# Patient Record
Sex: FEMALE | Race: WHITE | NOT HISPANIC OR LATINO | Employment: PART TIME | URBAN - METROPOLITAN AREA
[De-identification: names, ages, dates, MRNs, and addresses within clinical notes are randomized per-mention and may not be internally consistent; named-entity substitution may affect disease eponyms.]

---

## 2017-01-17 ENCOUNTER — TELEPHONE (OUTPATIENT)
Dept: OBGYN | Facility: CLINIC | Age: 37
End: 2017-01-17

## 2017-01-17 NOTE — TELEPHONE ENCOUNTER
Called and left message at home # via  services RE: todays appointment. Pt is not 6 weeks post partum (delivered 12/22/2016) unless she is having any problems or issues she should reschedule this appt. (per Dr Castrejon).  Christina Preciado MA

## 2017-01-23 ENCOUNTER — APPOINTMENT (OUTPATIENT)
Dept: GENERAL RADIOLOGY | Facility: CLINIC | Age: 37
End: 2017-01-23
Attending: EMERGENCY MEDICINE
Payer: COMMERCIAL

## 2017-01-23 ENCOUNTER — HOSPITAL ENCOUNTER (EMERGENCY)
Facility: CLINIC | Age: 37
Discharge: HOME | End: 2017-01-23
Attending: EMERGENCY MEDICINE | Admitting: EMERGENCY MEDICINE
Payer: COMMERCIAL

## 2017-01-23 VITALS
TEMPERATURE: 98.1 F | SYSTOLIC BLOOD PRESSURE: 105 MMHG | RESPIRATION RATE: 16 BRPM | DIASTOLIC BLOOD PRESSURE: 70 MMHG | OXYGEN SATURATION: 100 %

## 2017-01-23 DIAGNOSIS — J45.901 ASTHMA EXACERBATION: ICD-10-CM

## 2017-01-23 DIAGNOSIS — J06.9 UPPER RESPIRATORY TRACT INFECTION, UNSPECIFIED TYPE: ICD-10-CM

## 2017-01-23 LAB
DEPRECATED S PYO AG THROAT QL EIA: NORMAL
FLUAV+FLUBV AG SPEC QL: NEGATIVE
FLUAV+FLUBV AG SPEC QL: NORMAL
MICRO REPORT STATUS: NORMAL
SPECIMEN SOURCE: NORMAL
SPECIMEN SOURCE: NORMAL

## 2017-01-23 PROCEDURE — 94640 AIRWAY INHALATION TREATMENT: CPT

## 2017-01-23 PROCEDURE — 25000125 ZZHC RX 250

## 2017-01-23 PROCEDURE — 87081 CULTURE SCREEN ONLY: CPT | Performed by: EMERGENCY MEDICINE

## 2017-01-23 PROCEDURE — 71020 XR CHEST 2 VW: CPT

## 2017-01-23 PROCEDURE — 87804 INFLUENZA ASSAY W/OPTIC: CPT | Performed by: EMERGENCY MEDICINE

## 2017-01-23 PROCEDURE — 99284 EMERGENCY DEPT VISIT MOD MDM: CPT | Mod: 25

## 2017-01-23 PROCEDURE — 87880 STREP A ASSAY W/OPTIC: CPT | Performed by: EMERGENCY MEDICINE

## 2017-01-23 RX ORDER — IPRATROPIUM BROMIDE AND ALBUTEROL SULFATE 2.5; .5 MG/3ML; MG/3ML
SOLUTION RESPIRATORY (INHALATION)
Status: COMPLETED
Start: 2017-01-23 | End: 2017-01-23

## 2017-01-23 RX ORDER — ALBUTEROL SULFATE 90 UG/1
2 AEROSOL, METERED RESPIRATORY (INHALATION) EVERY 4 HOURS PRN
Qty: 1 INHALER | Refills: 0 | Status: SHIPPED | OUTPATIENT
Start: 2017-01-23 | End: 2018-01-05

## 2017-01-23 RX ORDER — PREDNISONE 20 MG/1
TABLET ORAL
Qty: 10 TABLET | Refills: 0 | Status: SHIPPED | OUTPATIENT
Start: 2017-01-23 | End: 2017-04-16

## 2017-01-23 RX ORDER — IPRATROPIUM BROMIDE AND ALBUTEROL SULFATE 2.5; .5 MG/3ML; MG/3ML
3 SOLUTION RESPIRATORY (INHALATION) ONCE
Status: DISCONTINUED | OUTPATIENT
Start: 2017-01-23 | End: 2017-01-23

## 2017-01-23 RX ADMIN — IPRATROPIUM BROMIDE AND ALBUTEROL SULFATE 3 ML: .5; 3 SOLUTION RESPIRATORY (INHALATION) at 19:48

## 2017-01-23 ASSESSMENT — ENCOUNTER SYMPTOMS
ABDOMINAL PAIN: 0
WHEEZING: 1
FEVER: 0
COUGH: 1
NAUSEA: 0
VOMITING: 0
DIARRHEA: 0
SORE THROAT: 1

## 2017-01-23 NOTE — ED AVS SNAPSHOT
[facility] Emergency Department    [address]    [facility] [address]    Phone:  [phone]    Fax:  [phone]                                       Gabi Roblero   MRN: [id number]    Department:  [facility] Emergency Department   Date of Visit:  1/23/2017           Patient Information     Date Of Birth          1980        Your diagnoses for this visit were:     Upper respiratory tract infection, unspecified type     Asthma exacerbation        You were seen by Na Nazario MD.      Follow-up Information     Follow up with Dhruv Andino MD In 3 days.    Specialty:  Internal Medicine    Contact information:    [facility]  [address] [facility] [address]  [phone]          Discharge Instructions       PLease follow up closely with your regular physician. Please return to the ED if your symptoms worsen or if you develop new or concerning symptoms.     Discharge Instructions  Upper Respiratory Infection    The upper respiratory tract includes the sinuses, nasal passages, pharynx, and larynx. A URI, or upper respiratory infection, is an infection of any of the parts of the upper airway. Symptoms include runny nose, congestion, sore throat, cough, and fever. URIs are almost always caused by a virus. Antibiotics do not help with virus infections, so are not used for an ordinary URI. A URI is very contagious through coughing and nasal secretions; make sure you wash your hands often and clean surfaces after sneezing, coughing or touching them.  Viruses can live on surfaces for up to 3 days.      Return to the Emergency Department if:    Any of the symptoms you have get much worse.    You seem very sick, like being too weak to get up.    You have any new symptoms, especially serious things like chest pain.     You are short of breath.     You have a severe headache.    You are vomiting so much you can t keep fluids or medicines  down.    You have confusion or seem unusually drowsy.    You have a seizure or convulsion.    Follow-up:      You should start to improve in 3 - 5 days.  A cough can linger for up to six weeks, but overall you should be feeling much better.  See your doctor if you have a fever for more than 3 days, or if you are not feeling better within 5 days.      What can I do to help myself?    Fill any prescriptions the doctor gave you and take them right away    If you have a fever, get plenty of rest and drink lots of fluids, especially water. Using a humidifier or saline nose spray will also help loosen secretions.     What clothes or blankets you have on won t change your fever. Do what is comfortable for you.    Bathing or sponging in lukewarm water may help you feel better.    Tylenol  (acetaminophen), Motrin  (ibuprofen), or Advil  (ibuprofen) help bring fever down and may help you feel more comfortable. Be sure to read and follow the package directions, and ask your doctor if you have questions.    Do not drink alcohol.    Decongestants may help you feel better. You may use decongestant nose sprays Afrin  (oxymetazoline) or Omero-Synephrine  (phenylephrine hydrochloride) for up to 3 days, or may use a decongestant tablet like Sudafed  (pseudoephedrine).  If you were given a prescription for medicine here today, be sure to read all of the information (including the package insert) that comes with your prescription.  This will include important information about the medicine, its side effects, and any warnings that you need to know about.  The pharmacist who fills the prescription can provide more information and answer questions you may have about the medicine.  If you have questions or concerns that the pharmacist cannot address, please call or return to the Emergency Department.   Opioid Medication Information    Pain medications are among the most commonly prescribed medicines, so we are including this information for  all our patients. If you did not receive pain medication or get a prescription for pain medicine, you can ignore it.     You may have been given a prescription for an opioid (narcotic) pain medicine and/or have received a pain medicine while here in the Emergency Department. These medicines can make you drowsy or impaired. You must not drive, operate dangerous equipment, or engage in any other dangerous activities while taking these medications. If you drive while taking these medications, you could be arrested for DUI, or driving under the influence. Do not drink any alcohol while you are taking these medications.     Opioid pain medications can cause addiction. If you have a history of chemical dependency of any type, you are at a higher risk of becoming addicted to pain medications.  Only take these prescribed medications to treat your pain when all other options have been tried. Take it for as short a time and as few doses as possible. Store your pain pills in a secure place, as they are frequently stolen and provide a dangerous opportunity for children or visitors in your house to start abusing these powerful medications. We will not replace any lost or stolen medicine.  As soon as your pain is better, you should flush all your remaining medication.     Many prescription pain medications contain Tylenol  (acetaminophen), including Vicodin , Tylenol #3 , Norco , Lortab , and Percocet .  You should not take any extra pills of Tylenol  if you are using these prescription medications or you can get very sick.  Do not ever take more than 3000 mg of acetaminophen in any 24 hour period.    All opioids tend to cause constipation. Drink plenty of water and eat foods that have a lot of fiber, such as fruits, vegetables, prune juice, apple juice and high fiber cereal.  Take a laxative if you don t move your bowels at least every other day. Miralax , Milk of Magnesia, Colace , or Senna  can be used to keep you regular.       Remember that you can always come back to the Emergency Department if you are not able to see your regular doctor in the amount of time listed above, if you get any new symptoms, or if there is anything that worries you.          Future Appointments        Provider Department Dept Phone Center    1/24/2017 10:15 AM Kasey Mosqueda MD; [facility] [phone] [facility]      24 Hour Appointment Hotline       To make an appointment at any [facility], call [phone] ([phone]). If you don't have a family doctor or clinic, we will help you find one. [facility] are conveniently located to serve the needs of you and your family.             Review of your medicines      START taking        Dose / Directions Last dose taken    predniSONE 20 MG tablet   Commonly known as:  DELTASONE   Quantity:  10 tablet        Take two tablets (= 40mg) each day for 5 (five) days   Refills:  0          CONTINUE these medicines which may have CHANGED, or have new prescriptions. If we are uncertain of the size of tablets/capsules you have at home, strength may be listed as something that might have changed.        Dose / Directions Last dose taken    * albuterol (2.5 MG/3ML) 0.083% neb solution   Dose:  1 vial   What changed:    - Another medication with the same name was added. Make sure you understand how and when to take each.  - Another medication with the same name was changed. Make sure you understand how and when to take each.   Quantity:  30 vial        Take 1 vial (2.5 mg) by nebulization every 6 hours as needed for shortness of breath / dyspnea or wheezing   Refills:  11        * albuterol 108 (90 BASE) MCG/ACT Inhaler   Commonly known as:  PROAIR HFA/PROVENTIL HFA/VENTOLIN HFA   Dose:  2 puff   What changed:    - when to take this  - reasons to take this   Quantity:  1 Inhaler        Inhale 2 puffs into the lungs every 6 hours   Refills:  11        * albuterol 108 (90  BASE) MCG/ACT Inhaler   Commonly known as:  albuterol   Dose:  2 puff   What changed:  You were already taking a medication with the same name, and this prescription was added. Make sure you understand how and when to take each.   Quantity:  1 Inhaler        Inhale 2 puffs into the lungs every 4 hours as needed for shortness of breath / dyspnea   Refills:  0        * beclomethasone 40 MCG/ACT Inhaler   Commonly known as:  QVAR   Dose:  2 puff   What changed:  Another medication with the same name was added. Make sure you understand how and when to take each.   Quantity:  1 Inhaler        Inhale 2 puffs into the lungs 2 times daily   Refills:  1        * beclomethasone 40 MCG/ACT Inhaler   Commonly known as:  QVAR   Dose:  2 puff   What changed:  You were already taking a medication with the same name, and this prescription was added. Make sure you understand how and when to take each.   Quantity:  1 Inhaler        Inhale 2 puffs into the lungs 2 times daily   Refills:  0        * Notice:  This list has 5 medication(s) that are the same as other medications prescribed for you. Read the directions carefully, and ask your doctor or other care provider to review them with you.      Our records show that you are taking the medicines listed below. If these are incorrect, please call your family doctor or clinic.        Dose / Directions Last dose taken    Abdominal Binder/Elastic 3XL Misc   Dose:  1 Device   Quantity:  1 each        1 Device daily   Refills:  1        acetaminophen 325 MG tablet   Commonly known as:  TYLENOL   Dose:  650 mg   Quantity:  100 tablet        Take 2 tablets (650 mg) by mouth every 4 hours as needed for mild pain or fever (greater than or equal to 38? C /100.4? F (oral) or 38.5? C/ 101.4? F (core).)   Refills:  1        Alum & Mag Hydroxide-Simeth 200-200-20 MG Chew   Dose:  1-2 tablet   Quantity:  480 tablet        Take 1-2 tablets by mouth every 4 hours as needed   Refills:  0        docusate  sodium 100 MG tablet   Commonly known as:  COLACE   Dose:  100 mg   Quantity:  60 tablet        Take 100 mg by mouth daily   Refills:  1        ibuprofen 400 MG tablet   Commonly known as:  ADVIL/MOTRIN   Dose:  800 mg   Quantity:  120 tablet        Take 2 tablets (800 mg) by mouth every 6 hours as needed for other (cramping)   Refills:  0        * levothyroxine 88 MCG tablet   Commonly known as:  SYNTHROID/LEVOTHROID   Dose:  88 mcg   Quantity:  30 tablet        Take 1 tablet (88 mcg) by mouth daily   Refills:  1        * levothyroxine 100 MCG tablet   Commonly known as:  SYNTHROID/LEVOTHROID   Dose:  100 mcg   Quantity:  90 tablet        Take 1 tablet (100 mcg) by mouth daily   Refills:  1        * levothyroxine 100 MCG tablet   Commonly known as:  SYNTHROID/LEVOTHROID   Dose:  100 mcg   Quantity:  90 tablet        Take 1 tablet (100 mcg) by mouth daily   Refills:  3        omeprazole 40 MG capsule   Commonly known as:  priLOSEC   Dose:  40 mg   Quantity:  120 capsule        Take 1 capsule (40 mg) by mouth daily   Refills:  11        order for DME   Quantity:  1 Device        Equipment being ordered: Nebulizer   Refills:  0        oxyCODONE 5 MG IR tablet   Commonly known as:  ROXICODONE   Dose:  5-10 mg   Quantity:  18 tablet        Take 1-2 tablets (5-10 mg) by mouth every 3 hours as needed for moderate to severe pain   Refills:  0        polyethylene glycol powder   Commonly known as:  MIRALAX   Dose:  1 capful   Quantity:  510 g        Take 17 g (1 capful) by mouth daily   Refills:  11        prenatal multivitamin  plus iron 27-0.8 MG Tabs per tablet   Dose:  1 tablet   Quantity:  100 tablet        Take 1 tablet by mouth daily   Refills:  3        senna-docusate 8.6-50 MG per tablet   Commonly known as:  SENOKOT-S;PERICOLACE   Dose:  1-2 tablet   Quantity:  100 tablet        Take 1-2 tablets by mouth 2 times daily   Refills:  1        * Notice:  This list has 3 medication(s) that are the same as other  medications prescribed for you. Read the directions carefully, and ask your doctor or other care provider to review them with you.            Prescriptions were sent or printed at these locations (3 Prescriptions)                   Other Prescriptions                Printed at Department/Unit printer (3 of 3)         predniSONE (DELTASONE) 20 MG tablet               albuterol (ALBUTEROL) 108 (90 BASE) MCG/ACT Inhaler               beclomethasone (QVAR) 40 MCG/ACT Inhaler                Procedures and tests performed during your visit     Beta strep group A culture    Chest XR,  PA & LAT    Influenza A/B antigen    Rapid strep screen      Orders Needing Specimen Collection     None      Pending Results     Date and Time Order Name Status Description    1/23/2017 1947 Chest XR,  PA & LAT Preliminary     1/23/2017 1946 Beta strep group A culture In process             Pending Culture Results     Date and Time Order Name Status Description    1/23/2017 1946 Beta strep group A culture In process        Test Results from your hospital stay           1/23/2017  8:25 PM - Interface, Flexilab Results      Component Results     Component Value Ref Range & Units Status    Influenza A/B Agn Specimen Nose  Final    Influenza A Negative NEG Final    Influenza B  NEG Final    Negative   Test results must be correlated with clinical data. If necessary, results   should be confirmed by a molecular assay or viral culture.           1/23/2017  8:02 PM - Interface, Flexilab Results      Component Results     Component    Specimen Description    Throat    Rapid Strep A Screen    NEGATIVE: No Group A streptococcal antigen detected by immunoassay, await   culture report.      Micro Report Status    FINAL 01/23/2017 1/23/2017  8:33 PM - Interface, Radiant Ib      Narrative     CHEST TWO VIEWS   1/23/2017  8:27 PM     HISTORY: Coughing.    COMPARISON: 10/7/2016.    FINDINGS: Heart size is normal. Lungs clear. No interval change.         Impression     IMPRESSION: Negative.         1/23/2017  8:02 PM - Interface, Flexilab Results                Clinical Quality Measure: Blood Pressure Screening     Your blood pressure was checked while you were in the emergency department today. The last reading we obtained was  BP: 114/74 mmHg . Please read the guidelines below about what these numbers mean and what you should do about them.  If your systolic blood pressure (the top number) is less than 120 and your diastolic blood pressure (the bottom number) is less than 80, then your blood pressure is normal. There is nothing more that you need to do about it.  If your systolic blood pressure (the top number) is 120-139 or your diastolic blood pressure (the bottom number) is 80-89, your blood pressure may be higher than it should be. You should have your blood pressure rechecked within a year by a primary care provider.  If your systolic blood pressure (the top number) is 140 or greater or your diastolic blood pressure (the bottom number) is 90 or greater, you may have high blood pressure. High blood pressure is treatable, but if left untreated over time it can put you at risk for heart attack, stroke, or kidney failure. You should have your blood pressure rechecked by a primary care provider within the next 4 weeks.  If your provider in the emergency department today gave you specific instructions to follow-up with your doctor or provider even sooner than that, you should follow that instruction and not wait for up to 4 weeks for your follow-up visit.        Thank you for choosing [facility]       Thank you for choosing [facility] for your care. Our goal is always to provide you with excellent care. Hearing back from our patients is one way we can continue to improve our services. Please take a few minutes to complete the written survey that you may receive in the mail after you visit with us. Thank you!        [employer] Information     [employer] lets you send  "messages to your doctor, view your test results, renew your prescriptions, schedule appointments and more. To sign up, go to [email or web address] . Click on \"Log in\" on the left side of the screen, which will take you to the Welcome page. Then click on \"Sign up Now\" on the right side of the page.     You will be asked to enter the access code listed below, as well as some personal information. Please follow the directions to create your username and password.     Your access code is: [email or web address]  Expires: 2017  3:53 PM     Your access code will  in 90 days. If you need help or a new code, please call your [facility] clinic or [phone].        Care EveryWhere ID     This is your Care EveryWhere ID. This could be used by other organizations to access your [facility] medical records  [id number]        After Visit Summary       This is your record. Keep this with you and show to your community pharmacist(s) and doctor(s) at your next visit.                  "

## 2017-01-23 NOTE — ED AVS SNAPSHOT
[facility] Emergency Department    [address]    [facility] [address]    Phone:  [phone]    Fax:  [phone]                                       Gabi Roblero   MRN: [id number]    Department:  [facility] Emergency Department   Date of Visit:  1/23/2017           After Visit Summary Signature Page     I have received my discharge instructions, and my questions have been answered. I have discussed any challenges I see with this plan with the nurse or doctor.    ..........................................................................................................................................  Patient/Patient Representative Signature      ..........................................................................................................................................  Patient Representative Print Name and Relationship to Patient    ..................................................               ................................................  Date                                            Time    ..........................................................................................................................................  Reviewed by Signature/Title    ...................................................              ..............................................  Date                                                            Time

## 2017-01-24 NOTE — ED NOTES
Pt c/o sore throat that started last night. Pt states Hx of Asthma and also c/o cough and increased SOB. Pt ABCs intact and A&Ox4.

## 2017-01-24 NOTE — DISCHARGE INSTRUCTIONS
PLease follow up closely with your regular physician. Please return to the ED if your symptoms worsen or if you develop new or concerning symptoms.     Discharge Instructions  Upper Respiratory Infection    The upper respiratory tract includes the sinuses, nasal passages, pharynx, and larynx. A URI, or upper respiratory infection, is an infection of any of the parts of the upper airway. Symptoms include runny nose, congestion, sore throat, cough, and fever. URIs are almost always caused by a virus. Antibiotics do not help with virus infections, so are not used for an ordinary URI. A URI is very contagious through coughing and nasal secretions; make sure you wash your hands often and clean surfaces after sneezing, coughing or touching them.  Viruses can live on surfaces for up to 3 days.      Return to the Emergency Department if:    Any of the symptoms you have get much worse.    You seem very sick, like being too weak to get up.    You have any new symptoms, especially serious things like chest pain.     You are short of breath.     You have a severe headache.    You are vomiting so much you can t keep fluids or medicines down.    You have confusion or seem unusually drowsy.    You have a seizure or convulsion.    Follow-up:      You should start to improve in 3 - 5 days.  A cough can linger for up to six weeks, but overall you should be feeling much better.  See your doctor if you have a fever for more than 3 days, or if you are not feeling better within 5 days.      What can I do to help myself?    Fill any prescriptions the doctor gave you and take them right away    If you have a fever, get plenty of rest and drink lots of fluids, especially water. Using a humidifier or saline nose spray will also help loosen secretions.     What clothes or blankets you have on won t change your fever. Do what is comfortable for you.    Bathing or sponging in lukewarm water may help you feel better.    Tylenol  (acetaminophen),  Motrin  (ibuprofen), or Advil  (ibuprofen) help bring fever down and may help you feel more comfortable. Be sure to read and follow the package directions, and ask your doctor if you have questions.    Do not drink alcohol.    Decongestants may help you feel better. You may use decongestant nose sprays Afrin  (oxymetazoline) or Omero-Synephrine  (phenylephrine hydrochloride) for up to 3 days, or may use a decongestant tablet like Sudafed  (pseudoephedrine).  If you were given a prescription for medicine here today, be sure to read all of the information (including the package insert) that comes with your prescription.  This will include important information about the medicine, its side effects, and any warnings that you need to know about.  The pharmacist who fills the prescription can provide more information and answer questions you may have about the medicine.  If you have questions or concerns that the pharmacist cannot address, please call or return to the Emergency Department.   Opioid Medication Information    Pain medications are among the most commonly prescribed medicines, so we are including this information for all our patients. If you did not receive pain medication or get a prescription for pain medicine, you can ignore it.     You may have been given a prescription for an opioid (narcotic) pain medicine and/or have received a pain medicine while here in the Emergency Department. These medicines can make you drowsy or impaired. You must not drive, operate dangerous equipment, or engage in any other dangerous activities while taking these medications. If you drive while taking these medications, you could be arrested for DUI, or driving under the influence. Do not drink any alcohol while you are taking these medications.     Opioid pain medications can cause addiction. If you have a history of chemical dependency of any type, you are at a higher risk of becoming addicted to pain medications.  Only take  these prescribed medications to treat your pain when all other options have been tried. Take it for as short a time and as few doses as possible. Store your pain pills in a secure place, as they are frequently stolen and provide a dangerous opportunity for children or visitors in your house to start abusing these powerful medications. We will not replace any lost or stolen medicine.  As soon as your pain is better, you should flush all your remaining medication.     Many prescription pain medications contain Tylenol  (acetaminophen), including Vicodin , Tylenol #3 , Norco , Lortab , and Percocet .  You should not take any extra pills of Tylenol  if you are using these prescription medications or you can get very sick.  Do not ever take more than 3000 mg of acetaminophen in any 24 hour period.    All opioids tend to cause constipation. Drink plenty of water and eat foods that have a lot of fiber, such as fruits, vegetables, prune juice, apple juice and high fiber cereal.  Take a laxative if you don t move your bowels at least every other day. Miralax , Milk of Magnesia, Colace , or Senna  can be used to keep you regular.      Remember that you can always come back to the Emergency Department if you are not able to see your regular doctor in the amount of time listed above, if you get any new symptoms, or if there is anything that worries you.

## 2017-01-24 NOTE — ED NOTES
Pt states she uses an inhaler for Asthma at home. Pt requesting an asthma treatment for her SOB now while at hospital. MD advised and stated okay to give patient one Duoneb.

## 2017-01-24 NOTE — ED NOTES
Pt ABCs intact and A&Ox4. Pt states feeling better. Family members present now. MD also at bedside. Discharge instructions completed. Prescriptions provided to patient.

## 2017-01-24 NOTE — ED PROVIDER NOTES
History     Chief Complaint:  Pharyngitis and Cough    HPI was completed through  phone at bedside.   HPI   Gabi Roblero is a 37 year old female with history of asthma who presents to the emergency department today for evaluation of sore throat that began last night. Patient also complains of nonproductive cough and wheezing. She notes that she ran out of daily asthma medication last night and attributes her symptoms to this. Patient also notes recent ill contact with cousin who had sore throat. Patient denies nausea, vomiting, diarrhea, appetite changes, abdominal pain, or fever. No other concerns were voiced at this time.     Allergies:  No known drug allergies      Medications:     Albuterol  Levothyroxine  Omeprazole  Prenatal vitamin  Debrox ear solution  Qvar inhaler  Zyrtec     Past Medical History:     GERD  Moderate asthma  Abdominal hernia  Rotator cuff syndrome  Gastritis  Vitamin D deficiency      Past Surgical History:     Cholecystectomy     Family History:     Diabetes     Social History:  Presents with sister    Tobacco use: Negative  Alcohol use: Negative   Marital Status:       Review of Systems   Constitutional: Negative for fever.   HENT: Positive for sore throat.    Respiratory: Positive for cough and wheezing.    Gastrointestinal: Negative for nausea, vomiting, abdominal pain and diarrhea.   All other systems reviewed and are negative.    Physical Exam     Patient Vitals for the past 24 hrs:   BP Temp Temp src Heart Rate Resp SpO2   01/23/17 1933 114/74 mmHg 98.1  F (36.7  C) Oral 76 16 98 %        Physical Exam  Constitutional: The patient is oriented to person, place, and time. Alert and cooperative.  HENT:   Right Ear: External ear normal.   Left Ear: External ear normal.   Nose: Nose normal.   Mouth/Throat: Mild erythema in the posterior oropharynx. No tonsillar swelling or exudates. Uvula is midline.  Eyes: Conjunctivae, EOM and lids are normal. Pupils are equal,  round, and reactive to light.   Neck: Trachea normal. Normal range of motion. Neck supple.   Cardiovascular: Normal rate, regular rhythm, normal heart sounds, and intact distal pulses.    Pulmonary/Chest: Effort normal and breath sounds equal bilaterally. No crackles or wheezing.   Abdominal: Soft. No tenderness. No rebound and no guarding.   Musculoskeletal: Normal range of motion.  No extremity tenderness or edema.  Neurological: Alert and Oriented. Strength 5/5 in upper and lower extremities bilaterally. Sensation intact to light touch throughout.  Skin: Skin is dry. No rash noted.         Emergency Department Course     Imaging:  Radiology findings were communicated with the patient who voiced understanding of the findings.    Chest xray PA & LAT:  Negative  Reading per radiology    Laboratory:  Laboratory findings were communicated with the patient who voiced understanding of the findings.  Influenza A/B antigen: Negative  Rapid strep screen: Negative    Beta strep group A culture: Pending    Interventions:  1948 Duoneb 3 mL Nebulizer       Emergency Department Course:  Nursing notes and vitals reviewed.  I performed an exam of the patient as documented above.   The patient was sent for a chest xray while in the emergency department, results above.     At 2057 the patient was rechecked and updated on lab results.     I discussed the treatment plan with the patient. They expressed understanding of this plan and consented to discharge. They will be discharged home with instructions for care and follow up. In addition, the patient will return to the emergency department if their symptoms persist, worsen, if new symptoms arise or if there is any concern.  All questions were answered.    I personally reviewed the laboratory results with the Patient and answered all related questions prior to discharge.    Impression & Plan      Medical Decision Making:  Gabi Roblero is a 37 year old female who presents for  evaluation of sore throat and cough.  This is consistent with an upper respiratory tract infection.  There is no signs at this point of serious bacterial infection such as OM, RPA, epiglottitis, PTA, strep pharyngitis, pneumonia, sinusitis, meningitis, bacteremia, serious bacterial infection.  CXR is unremarkable, thus bacterial pneumonia is unlikely. The patient does note worsening asthma symptoms, thus I am concerned for an acute asthma exacerbation as well. Lungs are clear to auscultation on exam and she is in no acute respiratory distress with normal oxygen saturation, thus I feel that she is safe for discharge to home. There are no gastrointestinal symptoms at this point and no signs of dehydration.  Close followup with primary care physician is indicated.  Return to ED for fever > 103, protracted vomiting, confusion. She notes understanding and agreement. She was stable/improved at the time of discharge.     Diagnosis:    ICD-10-CM    1. Upper respiratory tract infection, unspecified type J06.9    2. Asthma exacerbation J45.901        Disposition:   Discharge home; plan for follow up with Dhruv Andino    Discharge Medications:  New Prescriptions    ALBUTEROL (ALBUTEROL) 108 (90 BASE) MCG/ACT INHALER    Inhale 2 puffs into the lungs every 4 hours as needed for shortness of breath / dyspnea    BECLOMETHASONE (QVAR) 40 MCG/ACT INHALER    Inhale 2 puffs into the lungs 2 times daily    PREDNISONE (DELTASONE) 20 MG TABLET    Take two tablets (= 40mg) each day for 5 (five) days       Scribe Disclosure:  Herminio BOWLES, am serving as a scribe at 7:26 PM on 1/23/2017 to document services personally performed by Na Nazario MD, based on my observations and the provider's statements to me.    [facility] EMERGENCY DEPARTMENT        Na Nazario MD  01/24/17 1692

## 2017-01-25 LAB
BACTERIA SPEC CULT: NORMAL
MICRO REPORT STATUS: NORMAL
SPECIMEN SOURCE: NORMAL

## 2017-02-02 ENCOUNTER — PRENATAL OFFICE VISIT (OUTPATIENT)
Dept: OBGYN | Facility: CLINIC | Age: 37
End: 2017-02-02
Payer: COMMERCIAL

## 2017-02-02 VITALS
BODY MASS INDEX: 48.2 KG/M2 | SYSTOLIC BLOOD PRESSURE: 110 MMHG | WEIGHT: 293 LBS | DIASTOLIC BLOOD PRESSURE: 52 MMHG | TEMPERATURE: 97.5 F

## 2017-02-02 DIAGNOSIS — K21.9 GASTROESOPHAGEAL REFLUX DISEASE, ESOPHAGITIS PRESENCE NOT SPECIFIED: ICD-10-CM

## 2017-02-02 DIAGNOSIS — R53.83 OTHER FATIGUE: ICD-10-CM

## 2017-02-02 DIAGNOSIS — E03.8 OTHER SPECIFIED HYPOTHYROIDISM: Primary | ICD-10-CM

## 2017-02-02 LAB
T4 FREE SERPL-MCNC: 1.12 NG/DL (ref 0.76–1.46)
TSH SERPL DL<=0.005 MIU/L-ACNC: 4.67 MU/L (ref 0.4–4)

## 2017-02-02 PROCEDURE — 99207 ZZC POST PARTUM EXAM: CPT | Performed by: OBSTETRICS & GYNECOLOGY

## 2017-02-02 PROCEDURE — 82607 VITAMIN B-12: CPT | Performed by: OBSTETRICS & GYNECOLOGY

## 2017-02-02 PROCEDURE — 84439 ASSAY OF FREE THYROXINE: CPT | Performed by: OBSTETRICS & GYNECOLOGY

## 2017-02-02 PROCEDURE — 84443 ASSAY THYROID STIM HORMONE: CPT | Performed by: OBSTETRICS & GYNECOLOGY

## 2017-02-02 PROCEDURE — 36415 COLL VENOUS BLD VENIPUNCTURE: CPT | Performed by: OBSTETRICS & GYNECOLOGY

## 2017-02-02 PROCEDURE — 82306 VITAMIN D 25 HYDROXY: CPT | Performed by: OBSTETRICS & GYNECOLOGY

## 2017-02-02 NOTE — PROGRESS NOTES
SUBJECTIVE: Gabi is here for a 6-week postpartum checkup.    Delivery date was 2016. She had a  of a viable girl, weight 7 pounds 9 oz., with no complications.  Since delivery, she has been breast feeding.  She has no signs of infection, bleeding or other complications.  She is not pregnant.  We discussed contraceptions and she has chosen none.  She  has had intercourse since delivery and complains of No discomfort. Patient screened for postpartum depression and complaints are NEGATIVE. Screening has also been completed for intimate partner violence.    She reports normal bowel and bladder function. She has had severe GERD, before and during pregnancy, and still persists. Prior to pregnancy, was considering endoscopy. She needs referral to GI, so that was done today. Will recheck thyroid today. Also added vitamin B12 and vitamin D at her request for generalized aches and fatigue. If abnormal, will recommend follow up with her primary care provider.      EXAM:  Today's Depression Rating was No flowsheet data found.    GENERAL healthy, alert and no distress  GI: positive for: abnormal findings: abdomen inspection obese and positive for diastasis recti  GYN PELVIC: NEGATIVE, normal external genitalia, normal urethral meatus and normal adnexa, no masses or tenderness    ASSESSMENT:   Normal postpartum exam after .  Hypothyroidism  Fatigue and joint aches.  GERD    PLAN:  As above, labs. Will refer back to primary care provider as needed.  Refer to GI.  [place] desire [place]  Return as needed or at time of next expected pap, pelvic, or breast exam.    Sandra Castrejon MD

## 2017-02-02 NOTE — NURSING NOTE
"Chief Complaint   Patient presents with     Post Partum Exam       Initial /52 mmHg  Temp(Src) 97.5  F (36.4  C) (Oral)  Wt 298 lb 8 oz (135.399 kg)  LMP 03/12/2016  Breastfeeding? Yes Estimated body mass index is 48.2 kg/(m^2) as calculated from the following:    Height as of 12/21/16: 5' 6\" (1.676 m).    Weight as of this encounter: 298 lb 8 oz (135.399 kg).  BP completed using cuff size: maycol Hernandez MA    "

## 2017-02-02 NOTE — MR AVS SNAPSHOT
After Visit Summary   2/2/2017    Gabi Roblero    MRN: [id number]           Patient Information     Date Of Birth          1980        Visit Information        Provider Department      2/2/2017 2:15 PM Sandra Castrejon MD; [facility] [employer] [facility]        Today's Diagnoses     Other specified hypothyroidism    -  1     Other fatigue         Gastroesophageal reflux disease, esophagitis presence not specified         Routine postpartum follow-up            Follow-ups after your visit        Additional Services     GASTROENTEROLOGY ADULT REF CONSULT ONLY       Preferred Location: [facility] [phone]      Please be aware that coverage of these services is subject to the terms and limitations of your health insurance plan.  Call member services at your health plan with any benefit or coverage questions.  Any procedures must be performed at a [facility] facility OR coordinated by your clinic's referral office.    Please bring the following with you to your appointment:    (1) Any X-Rays, CTs or MRIs which have been performed.  Contact the facility where they were done to arrange for  prior to your scheduled appointment.    (2) List of current medications   (3) This referral request   (4) Any documents/labs given to you for this referral                  Who to contact     If you have questions or need follow up information about today's clinic visit or your schedule please contact [facility] directly at [phone].  Normal or non-critical lab and imaging results will be communicated to you by MyChart, letter or phone within 4 business days after the clinic has received the results. If you do not hear from us within 7 days, please contact the clinic through MyChart or phone. If you have a critical or abnormal lab result, we will notify you by phone as soon as possible.  Submit refill requests through [employer] or call your pharmacy and they  "will forward the refill request to us. Please allow 3 business days for your refill to be completed.          Additional Information About Your Visit        [email or web address] Information     [email or web address] lets you send messages to your doctor, view your test results, renew your prescriptions, schedule appointments and more. To sign up, go to [email or web address] . Click on \"Log in\" on the left side of the screen, which will take you to the Welcome page. Then click on \"Sign up Now\" on the right side of the page.     You will be asked to enter the access code listed below, as well as some personal information. Please follow the directions to create your username and password.     Your access code is: [email or web address]  Expires: 2017  3:53 PM     Your access code will  in 90 days. If you need help or a new code, please call your [place] clinic or [phone].        Care EveryWhere ID     This is your Care EveryWhere ID. This could be used by other organizations to access your [place] medical records  [id number]        Your Vitals Were     Temperature Last Period Breastfeeding?             97.5  F (36.4  C) (Oral) 2017 (Approximate) Yes          Blood Pressure from Last 3 Encounters:   17 110/52   17 105/70   16 118/62    Weight from Last 3 Encounters:   17 298 lb 8 oz (135.399 kg)   16 307 lb 1.6 oz (139.3 kg)   16 307 lb 8 oz (139.481 kg)              We Performed the Following     GASTROENTEROLOGY ADULT REF CONSULT ONLY     TSH with free T4 reflex     Vitamin B12     Vitamin D Deficiency          Today's Medication Changes          These changes are accurate as of: 17  3:20 PM.  If you have any questions, ask your nurse or doctor.               These medicines have changed or have updated prescriptions.        Dose/Directions    * albuterol (2.5 MG/3ML) 0.083% neb solution   This may have changed:  Another medication with the same name was changed. Make sure you understand " how and when to take each.   Used for:  Moderate persistent asthma without complication   Changed by:  Zora Srinivasan DO        Dose:  1 vial   Take 1 vial (2.5 mg) by nebulization every 6 hours as needed for shortness of breath / dyspnea or wheezing   Quantity:  30 vial   Refills:  11       * albuterol 108 (90 BASE) MCG/ACT Inhaler   Commonly known as:  PROAIR HFA/PROVENTIL HFA/VENTOLIN HFA   This may have changed:    - when to take this  - reasons to take this   Used for:  Moderate persistent asthma without complication   Changed by:  Zora Srinivasan DO        Dose:  2 puff   Inhale 2 puffs into the lungs every 6 hours   Quantity:  1 Inhaler   Refills:  11       * albuterol 108 (90 BASE) MCG/ACT Inhaler   Commonly known as:  albuterol   This may have changed:  Another medication with the same name was changed. Make sure you understand how and when to take each.        Dose:  2 puff   Inhale 2 puffs into the lungs every 4 hours as needed for shortness of breath / dyspnea   Quantity:  1 Inhaler   Refills:  0       * Notice:  This list has 3 medication(s) that are the same as other medications prescribed for you. Read the directions carefully, and ask your doctor or other care provider to review them with you.             Primary Care Provider Office Phone # Fax #    [facility] AMY Andino [phone]       [place] [address] [place] [address]        Thank you!     Thank you for choosing [facility]  for your care. Our goal is always to provide you with excellent care. Hearing back from our patients is one way we can continue to improve our services. Please take a few minutes to complete the written survey that you may receive in the mail after your visit with us. Thank you!             Your Updated Medication List - Protect others around you: Learn how to safely use, store and throw away your medicines at www.disposemymeds.org.          This  list is accurate as of: 2/2/17  3:20 PM.  Always use your most recent med list.                   Brand Name Dispense Instructions for use    Abdominal Binder/Elastic 3XL Misc     1 each    1 Device daily       acetaminophen 325 MG tablet    TYLENOL    100 tablet    Take 2 tablets (650 mg) by mouth every 4 hours as needed for mild pain or fever (greater than or equal to 38? C /100.4? F (oral) or 38.5? C/ 101.4? F (core).)       * albuterol (2.5 MG/3ML) 0.083% neb solution     30 vial    Take 1 vial (2.5 mg) by nebulization every 6 hours as needed for shortness of breath / dyspnea or wheezing       * albuterol 108 (90 BASE) MCG/ACT Inhaler    PROAIR HFA/PROVENTIL HFA/VENTOLIN HFA    1 Inhaler    Inhale 2 puffs into the lungs every 6 hours       * albuterol 108 (90 BASE) MCG/ACT Inhaler    albuterol    1 Inhaler    Inhale 2 puffs into the lungs every 4 hours as needed for shortness of breath / dyspnea       Alum & Mag Hydroxide-Simeth 200-200-20 MG Chew     480 tablet    Take 1-2 tablets by mouth every 4 hours as needed       * beclomethasone 40 MCG/ACT Inhaler    QVAR    1 Inhaler    Inhale 2 puffs into the lungs 2 times daily       * beclomethasone 40 MCG/ACT Inhaler    QVAR    1 Inhaler    Inhale 2 puffs into the lungs 2 times daily       docusate sodium 100 MG tablet    COLACE    60 tablet    Take 100 mg by mouth daily       ibuprofen 400 MG tablet    ADVIL/MOTRIN    120 tablet    Take 2 tablets (800 mg) by mouth every 6 hours as needed for other (cramping)       * levothyroxine 88 MCG tablet    SYNTHROID/LEVOTHROID    30 tablet    Take 1 tablet (88 mcg) by mouth daily       * levothyroxine 100 MCG tablet    SYNTHROID/LEVOTHROID    90 tablet    Take 1 tablet (100 mcg) by mouth daily       * levothyroxine 100 MCG tablet    SYNTHROID/LEVOTHROID    90 tablet    Take 1 tablet (100 mcg) by mouth daily       omeprazole 40 MG capsule    priLOSEC    120 capsule    Take 1 capsule (40 mg) by mouth daily       order for DME      1 Device    Equipment being ordered: Nebulizer       oxyCODONE 5 MG IR tablet    ROXICODONE    18 tablet    Take 1-2 tablets (5-10 mg) by mouth every 3 hours as needed for moderate to severe pain       polyethylene glycol powder    MIRALAX    510 g    Take 17 g (1 capful) by mouth daily       predniSONE 20 MG tablet    DELTASONE    10 tablet    Take two tablets (= 40mg) each day for 5 (five) days       prenatal multivitamin  plus iron 27-0.8 MG Tabs per tablet     100 tablet    Take 1 tablet by mouth daily       senna-docusate 8.6-50 MG per tablet    SENOKOT-S;PERICOLACE    100 tablet    Take 1-2 tablets by mouth 2 times daily       * Notice:  This list has 8 medication(s) that are the same as other medications prescribed for you. Read the directions carefully, and ask your doctor or other care provider to review them with you.

## 2017-02-03 LAB — VIT B12 SERPL-MCNC: 376 PG/ML (ref 193–986)

## 2017-02-08 LAB — DEPRECATED CALCIDIOL+CALCIFEROL SERPL-MC: 31 UG/L (ref 20–75)

## 2017-03-09 DIAGNOSIS — J45.40 MODERATE PERSISTENT ASTHMA WITHOUT COMPLICATION: ICD-10-CM

## 2017-03-10 NOTE — TELEPHONE ENCOUNTER
"Call from pharmacy stating that pt has MA and Dr. Capps can not write prescriptions for pt's with MA. Need new Rx for Qvar. Verbal order received from Dr. Srinivasan.  Attempted to enter order and \"high\" warning popped up for contradiction with breast feeding. Will route to Dr. Castrejon as she saw pt last for PP visit and Dr. Srinivasan is off tomorrow.  "

## 2017-03-12 ENCOUNTER — APPOINTMENT (OUTPATIENT)
Dept: GENERAL RADIOLOGY | Facility: CLINIC | Age: 37
End: 2017-03-12
Attending: EMERGENCY MEDICINE
Payer: COMMERCIAL

## 2017-03-12 ENCOUNTER — HOSPITAL ENCOUNTER (EMERGENCY)
Facility: CLINIC | Age: 37
Discharge: HOME | End: 2017-03-12
Attending: EMERGENCY MEDICINE | Admitting: EMERGENCY MEDICINE
Payer: COMMERCIAL

## 2017-03-12 VITALS
SYSTOLIC BLOOD PRESSURE: 123 MMHG | OXYGEN SATURATION: 99 % | HEART RATE: 81 BPM | RESPIRATION RATE: 18 BRPM | TEMPERATURE: 97.8 F | DIASTOLIC BLOOD PRESSURE: 70 MMHG

## 2017-03-12 DIAGNOSIS — R11.0 NAUSEA: ICD-10-CM

## 2017-03-12 DIAGNOSIS — R10.13 ABDOMINAL PAIN, EPIGASTRIC: ICD-10-CM

## 2017-03-12 LAB
ALBUMIN SERPL-MCNC: 3.1 G/DL (ref 3.4–5)
ALP SERPL-CCNC: 122 U/L (ref 40–150)
ALT SERPL W P-5'-P-CCNC: 41 U/L (ref 0–50)
ANION GAP SERPL CALCULATED.3IONS-SCNC: 6 MMOL/L (ref 3–14)
AST SERPL W P-5'-P-CCNC: 61 U/L (ref 0–45)
BASOPHILS # BLD AUTO: 0 10E9/L (ref 0–0.2)
BASOPHILS NFR BLD AUTO: 0 %
BILIRUB DIRECT SERPL-MCNC: <0.1 MG/DL (ref 0–0.2)
BILIRUB SERPL-MCNC: 0.3 MG/DL (ref 0.2–1.3)
BUN SERPL-MCNC: 14 MG/DL (ref 7–30)
CALCIUM SERPL-MCNC: 8.2 MG/DL (ref 8.5–10.1)
CHLORIDE SERPL-SCNC: 106 MMOL/L (ref 94–109)
CO2 SERPL-SCNC: 30 MMOL/L (ref 20–32)
CREAT SERPL-MCNC: 0.58 MG/DL (ref 0.52–1.04)
DIFFERENTIAL METHOD BLD: ABNORMAL
EOSINOPHIL # BLD AUTO: 0.2 10E9/L (ref 0–0.7)
EOSINOPHIL NFR BLD AUTO: 3.9 %
ERYTHROCYTE [DISTWIDTH] IN BLOOD BY AUTOMATED COUNT: 17 % (ref 10–15)
GFR SERPL CREATININE-BSD FRML MDRD: ABNORMAL ML/MIN/1.7M2
GLUCOSE SERPL-MCNC: 86 MG/DL (ref 70–99)
HCT VFR BLD AUTO: 37.8 % (ref 35–47)
HGB BLD-MCNC: 11.7 G/DL (ref 11.7–15.7)
IMM GRANULOCYTES # BLD: 0 10E9/L (ref 0–0.4)
IMM GRANULOCYTES NFR BLD: 0.4 %
LIPASE SERPL-CCNC: 118 U/L (ref 73–393)
LYMPHOCYTES # BLD AUTO: 2.3 10E9/L (ref 0.8–5.3)
LYMPHOCYTES NFR BLD AUTO: 50.3 %
MCH RBC QN AUTO: 24.9 PG (ref 26.5–33)
MCHC RBC AUTO-ENTMCNC: 31 G/DL (ref 31.5–36.5)
MCV RBC AUTO: 81 FL (ref 78–100)
MONOCYTES # BLD AUTO: 0.6 10E9/L (ref 0–1.3)
MONOCYTES NFR BLD AUTO: 12 %
NEUTROPHILS # BLD AUTO: 1.5 10E9/L (ref 1.6–8.3)
NEUTROPHILS NFR BLD AUTO: 33.4 %
NRBC # BLD AUTO: 0 10*3/UL
NRBC BLD AUTO-RTO: 0 /100
PLATELET # BLD AUTO: 204 10E9/L (ref 150–450)
POTASSIUM SERPL-SCNC: 3.6 MMOL/L (ref 3.4–5.3)
PROT SERPL-MCNC: 7.6 G/DL (ref 6.8–8.8)
RBC # BLD AUTO: 4.69 10E12/L (ref 3.8–5.2)
SODIUM SERPL-SCNC: 142 MMOL/L (ref 133–144)
WBC # BLD AUTO: 4.6 10E9/L (ref 4–11)

## 2017-03-12 PROCEDURE — 96374 THER/PROPH/DIAG INJ IV PUSH: CPT

## 2017-03-12 PROCEDURE — 25000132 ZZH RX MED GY IP 250 OP 250 PS 637: Performed by: EMERGENCY MEDICINE

## 2017-03-12 PROCEDURE — 74020 XR ABDOMEN 2 VW: CPT

## 2017-03-12 PROCEDURE — 25000128 H RX IP 250 OP 636

## 2017-03-12 PROCEDURE — 93005 ELECTROCARDIOGRAM TRACING: CPT

## 2017-03-12 PROCEDURE — 83690 ASSAY OF LIPASE: CPT | Performed by: EMERGENCY MEDICINE

## 2017-03-12 PROCEDURE — 99285 EMERGENCY DEPT VISIT HI MDM: CPT | Mod: 25

## 2017-03-12 PROCEDURE — 80048 BASIC METABOLIC PNL TOTAL CA: CPT | Performed by: EMERGENCY MEDICINE

## 2017-03-12 PROCEDURE — 85025 COMPLETE CBC W/AUTO DIFF WBC: CPT | Performed by: EMERGENCY MEDICINE

## 2017-03-12 PROCEDURE — 80076 HEPATIC FUNCTION PANEL: CPT | Performed by: EMERGENCY MEDICINE

## 2017-03-12 PROCEDURE — 25000125 ZZHC RX 250: Performed by: EMERGENCY MEDICINE

## 2017-03-12 RX ORDER — ONDANSETRON 4 MG/1
4 TABLET, FILM COATED ORAL EVERY 6 HOURS
Qty: 8 TABLET | Refills: 0 | Status: SHIPPED | OUTPATIENT
Start: 2017-03-12 | End: 2017-04-20

## 2017-03-12 RX ORDER — ONDANSETRON HYDROCHLORIDE 4 MG/5ML
4 SOLUTION ORAL ONCE
Status: DISCONTINUED | OUTPATIENT
Start: 2017-03-12 | End: 2017-03-12

## 2017-03-12 RX ORDER — ONDANSETRON 2 MG/ML
INJECTION INTRAMUSCULAR; INTRAVENOUS
Status: COMPLETED
Start: 2017-03-12 | End: 2017-03-12

## 2017-03-12 RX ORDER — ONDANSETRON 2 MG/ML
4 INJECTION INTRAMUSCULAR; INTRAVENOUS ONCE
Status: COMPLETED | OUTPATIENT
Start: 2017-03-12 | End: 2017-03-12

## 2017-03-12 RX ADMIN — LIDOCAINE HYDROCHLORIDE 30 ML: 20 SOLUTION ORAL; TOPICAL at 08:10

## 2017-03-12 RX ADMIN — ONDANSETRON 4 MG: 2 INJECTION INTRAMUSCULAR; INTRAVENOUS at 08:09

## 2017-03-12 ASSESSMENT — ENCOUNTER SYMPTOMS
DIARRHEA: 0
ABDOMINAL PAIN: 1
NAUSEA: 1
VOMITING: 0

## 2017-03-12 NOTE — ED AVS SNAPSHOT
[facility] Emergency Department    [address] [facility] [address]    Phone:  [phone]    Fax:  [phone]                                       Gabi Roblero   MRN: [id number]    Department:  [facility] Emergency Department   Date of Visit:  3/12/2017           Patient Information     Date Of Birth          1980        Your diagnoses for this visit were:     Abdominal pain, epigastric     Nausea        You were seen by Nicolas Medina MD.      Follow-up Information     Follow up with Dhruv Andino MD In 2 days.    Specialty:  Internal Medicine    Contact information:    [facility]  [address] [facility] [address]  [phone]        Discharge References/Attachments     GASTRITIS (ADULT) (ENGLISH)      24 Hour Appointment Hotline       To make an appointment at any [facility], call [phone] ([phone]). If you don't have a family doctor or clinic, we will help you find one. [facility] are conveniently located to serve the needs of you and your family.             Review of your medicines      START taking        Dose / Directions Last dose taken    ondansetron 4 MG tablet   Commonly known as:  ZOFRAN   Dose:  4 mg   Quantity:  8 tablet        Take 1 tablet (4 mg) by mouth every 6 hours   Refills:  0          Our records show that you are taking the medicines listed below. If these are incorrect, please call your family doctor or clinic.        Dose / Directions Last dose taken    Abdominal Binder/Elastic 3XL Misc   Dose:  1 Device   Quantity:  1 each        1 Device daily   Refills:  1        acetaminophen 325 MG tablet   Commonly known as:  TYLENOL   Dose:  650 mg   Quantity:  100 tablet        Take 2 tablets (650 mg) by mouth every 4 hours as needed for mild pain or fever (greater than or equal to 38? C /100.4? F (oral) or 38.5? C/ 101.4? F (core).)   Refills:  1        * albuterol (2.5 MG/3ML)  0.083% neb solution   Dose:  1 vial   Quantity:  30 vial        Take 1 vial (2.5 mg) by nebulization every 6 hours as needed for shortness of breath / dyspnea or wheezing   Refills:  11        * albuterol 108 (90 BASE) MCG/ACT Inhaler   Commonly known as:  PROAIR HFA/PROVENTIL HFA/VENTOLIN HFA   Dose:  2 puff   Quantity:  1 Inhaler        Inhale 2 puffs into the lungs every 6 hours   Refills:  11        * albuterol 108 (90 BASE) MCG/ACT Inhaler   Commonly known as:  albuterol   Dose:  2 puff   Quantity:  1 Inhaler        Inhale 2 puffs into the lungs every 4 hours as needed for shortness of breath / dyspnea   Refills:  0        Alum & Mag Hydroxide-Simeth 200-200-20 MG Chew   Dose:  1-2 tablet   Quantity:  480 tablet        Take 1-2 tablets by mouth every 4 hours as needed   Refills:  0        * beclomethasone 40 MCG/ACT Inhaler   Commonly known as:  QVAR   Dose:  2 puff   Quantity:  1 Inhaler        Inhale 2 puffs into the lungs 2 times daily   Refills:  0        * beclomethasone 40 MCG/ACT Inhaler   Commonly known as:  QVAR   Dose:  2 puff   Quantity:  1 Inhaler        Inhale 2 puffs into the lungs 2 times daily   Refills:  1        docusate sodium 100 MG tablet   Commonly known as:  COLACE   Dose:  100 mg   Quantity:  60 tablet        Take 100 mg by mouth daily   Refills:  1        ibuprofen 400 MG tablet   Commonly known as:  ADVIL/MOTRIN   Dose:  800 mg   Quantity:  120 tablet        Take 2 tablets (800 mg) by mouth every 6 hours as needed for other (cramping)   Refills:  0        * levothyroxine 88 MCG tablet   Commonly known as:  SYNTHROID/LEVOTHROID   Dose:  88 mcg   Quantity:  30 tablet        Take 1 tablet (88 mcg) by mouth daily   Refills:  1        * levothyroxine 100 MCG tablet   Commonly known as:  SYNTHROID/LEVOTHROID   Dose:  100 mcg   Quantity:  90 tablet        Take 1 tablet (100 mcg) by mouth daily   Refills:  1        * levothyroxine 100 MCG tablet   Commonly known as:  SYNTHROID/LEVOTHROID    Dose:  100 mcg   Quantity:  90 tablet        Take 1 tablet (100 mcg) by mouth daily   Refills:  3        omeprazole 40 MG capsule   Commonly known as:  priLOSEC   Dose:  40 mg   Quantity:  120 capsule        Take 1 capsule (40 mg) by mouth daily   Refills:  11        * order for DME   Quantity:  1 Device        Equipment being ordered: Nebulizer   Refills:  0        * order for DME   Quantity:  1 each        Equipment being ordered: abdominal binder   Refills:  0        oxyCODONE 5 MG IR tablet   Commonly known as:  ROXICODONE   Dose:  5-10 mg   Quantity:  18 tablet        Take 1-2 tablets (5-10 mg) by mouth every 3 hours as needed for moderate to severe pain   Refills:  0        polyethylene glycol powder   Commonly known as:  MIRALAX   Dose:  1 capful   Quantity:  510 g        Take 17 g (1 capful) by mouth daily   Refills:  11        predniSONE 20 MG tablet   Commonly known as:  DELTASONE   Quantity:  10 tablet        Take two tablets (= 40mg) each day for 5 (five) days   Refills:  0        prenatal multivitamin  plus iron 27-0.8 MG Tabs per tablet   Dose:  1 tablet   Quantity:  100 tablet        Take 1 tablet by mouth daily   Refills:  3        senna-docusate 8.6-50 MG per tablet   Commonly known as:  SENOKOT-S;PERICOLACE   Dose:  1-2 tablet   Quantity:  100 tablet        Take 1-2 tablets by mouth 2 times daily   Refills:  1        * Notice:  This list has 10 medication(s) that are the same as other medications prescribed for you. Read the directions carefully, and ask your doctor or other care provider to review them with you.            Prescriptions were sent or printed at these locations (1 Prescription)                   Other Prescriptions                Printed at Department/Unit printer (1 of 1)         ondansetron (ZOFRAN) 4 MG tablet                Procedures and tests performed during your visit     Basic metabolic panel (BMP)    CBC + differential    EKG 12 lead    Hepatic panel    IV access     Lipase    XR Abdomen 2 Views      Orders Needing Specimen Collection     None      Pending Results     Date and Time Order Name Status Description    3/12/2017 0739 EKG 12 lead Preliminary             Pending Culture Results     No orders found from 3/10/2017 to 3/13/2017.             Test Results from your hospital stay     3/12/2017  8:21 AM - Interface, Flexilab Results      Component Results     Component Value Ref Range & Units Status    WBC 4.6 4.0 - 11.0 10e9/L Final    RBC Count 4.69 3.8 - 5.2 10e12/L Final    Hemoglobin 11.7 11.7 - 15.7 g/dL Final    Hematocrit 37.8 35.0 - 47.0 % Final    MCV 81 78 - 100 fl Final    MCH 24.9 (L) 26.5 - 33.0 pg Final    MCHC 31.0 (L) 31.5 - 36.5 g/dL Final    RDW 17.0 (H) 10.0 - 15.0 % Final    Platelet Count 204 150 - 450 10e9/L Final    Diff Method Automated Method  Final    % Neutrophils 33.4 % Final    % Lymphocytes 50.3 % Final    % Monocytes 12.0 % Final    % Eosinophils 3.9 % Final    % Basophils 0.0 % Final    % Immature Granulocytes 0.4 % Final    Nucleated RBCs 0 0 /100 Final    Absolute Neutrophil 1.5 (L) 1.6 - 8.3 10e9/L Final    Absolute Lymphocytes 2.3 0.8 - 5.3 10e9/L Final    Absolute Monocytes 0.6 0.0 - 1.3 10e9/L Final    Absolute Eosinophils 0.2 0.0 - 0.7 10e9/L Final    Absolute Basophils 0.0 0.0 - 0.2 10e9/L Final    Abs Immature Granulocytes 0.0 0 - 0.4 10e9/L Final    Absolute Nucleated RBC 0.0  Final         3/12/2017  8:37 AM - Interface, Flexilab Results      Component Results     Component Value Ref Range & Units Status    Sodium 142 133 - 144 mmol/L Final    Potassium 3.6 3.4 - 5.3 mmol/L Final    Chloride 106 94 - 109 mmol/L Final    Carbon Dioxide 30 20 - 32 mmol/L Final    Anion Gap 6 3 - 14 mmol/L Final    Glucose 86 70 - 99 mg/dL Final    Urea Nitrogen 14 7 - 30 mg/dL Final    Creatinine 0.58 0.52 - 1.04 mg/dL Final    GFR Estimate >90  Non  GFR Calc   >60 mL/min/1.7m2 Final    GFR Estimate If Black >90   GFR  Calc   >60 mL/min/1.7m2 Final    Calcium 8.2 (L) 8.5 - 10.1 mg/dL Final         3/12/2017  8:37 AM - Interface, Flexilab Results      Component Results     Component Value Ref Range & Units Status    Bilirubin Direct <0.1 0.0 - 0.2 mg/dL Final    Bilirubin Total 0.3 0.2 - 1.3 mg/dL Final    Albumin 3.1 (L) 3.4 - 5.0 g/dL Final    Protein Total 7.6 6.8 - 8.8 g/dL Final    Alkaline Phosphatase 122 40 - 150 U/L Final    ALT 41 0 - 50 U/L Final    AST 61 (H) 0 - 45 U/L Final         3/12/2017  8:37 AM - Interface, Flexilab Results      Component Results     Component Value Ref Range & Units Status    Lipase 118 73 - 393 U/L Final         3/12/2017  8:55 AM - Interface, Radiant Ib      Narrative     ABDOMEN TWO VIEWS 3/12/2017 8:41 AM     COMPARISON: None.    HISTORY: Pain.    FINDINGS: Abdominal bowel gas pattern is nonspecific. There is no  evidence for free intraperitoneal air.        Impression     IMPRESSION: No evidence for bowel obstruction or free air. There is  moderate stool throughout the colon.    BETZY DHILLON MD                Clinical Quality Measure: Blood Pressure Screening     Your blood pressure was checked while you were in the emergency department today. The last reading we obtained was  BP: 129/89 . Please read the guidelines below about what these numbers mean and what you should do about them.  If your systolic blood pressure (the top number) is less than 120 and your diastolic blood pressure (the bottom number) is less than 80, then your blood pressure is normal. There is nothing more that you need to do about it.  If your systolic blood pressure (the top number) is 120-139 or your diastolic blood pressure (the bottom number) is 80-89, your blood pressure may be higher than it should be. You should have your blood pressure rechecked within a year by a primary care provider.  If your systolic blood pressure (the top number) is 140 or greater or your diastolic blood pressure (the bottom number) is  "90 or greater, you may have high blood pressure. High blood pressure is treatable, but if left untreated over time it can put you at risk for heart attack, stroke, or kidney failure. You should have your blood pressure rechecked by a primary care provider within the next 4 weeks.  If your provider in the emergency department today gave you specific instructions to follow-up with your doctor or provider even sooner than that, you should follow that instruction and not wait for up to 4 weeks for your follow-up visit.        Thank you for choosing [facility]       Thank you for choosing [facility] for your care. Our goal is always to provide you with excellent care. Hearing back from our patients is one way we can continue to improve our services. Please take a few minutes to complete the written survey that you may receive in the mail after you visit with us. Thank you!        [employer] Information     panOpen lets you send messages to your doctor, view your test results, renew your prescriptions, schedule appointments and more. To sign up, go to [email or web address] . Click on \"Log in\" on the left side of the screen, which will take you to the Welcome page. Then click on \"Sign up Now\" on the right side of the page.     You will be asked to enter the access code listed below, as well as some personal information. Please follow the directions to create your username and password.     Your access code is: [email or web address]  Expires: 6/10/2017  9:05 AM     Your access code will  in 90 days. If you need help or a new code, please call your [facility] clinic or [phone].        Care EveryWhere ID     This is your Care EveryWhere ID. This could be used by other organizations to access your [facility] medical records  [id number]        After Visit Summary       This is your record. Keep this with you and show to your community pharmacist(s) and doctor(s) at your next visit.                  "

## 2017-03-12 NOTE — ED AVS SNAPSHOT
[facility] Emergency Department    [address]    [facility] [address]    Phone:  [phone]    Fax:  [phone]                                       Gabi Roblero   MRN: [id number]    Department:  [facility] Emergency Department   Date of Visit:  3/12/2017           After Visit Summary Signature Page     I have received my discharge instructions, and my questions have been answered. I have discussed any challenges I see with this plan with the nurse or doctor.    ..........................................................................................................................................  Patient/Patient Representative Signature      ..........................................................................................................................................  Patient Representative Print Name and Relationship to Patient    ..................................................               ................................................  Date                                            Time    ..........................................................................................................................................  Reviewed by Signature/Title    ...................................................              ..............................................  Date                                                            Time

## 2017-03-12 NOTE — ED NOTES
ABCs intact. Pt c/o epigastric pain radiating around to back 30-40 min pta. Pt states pain has subsided. Pt has hx gastric reflux and is on medication, but hasn't taken the medication for the past three days.     Pt's home meds: see epic

## 2017-03-12 NOTE — ED PROVIDER NOTES
History     Chief Complaint:  Chest Pain    HPI   Gabi Roblero is a 37 year old female with a history of GERD and gastritis who presents to the Emergency Department for evaluation of chest pain. The patient recently gave birth two months ago. Upon presentation she reports a sudden onset of nausea and mid epigastric pain about 45 minutes prior to arrival.  reports that she has not taken her omeprazole for the past three days and is scheduled for an upper endoscopy in the next two weeks.The patient denies any vomiting, diarrhea or urinary symptoms.     Allergies:  No known drug allergies    Medications:    beclomethasone (QVAR) 40 MCG/ACT Inhaler  order for DME  predniSONE (DELTASONE) 20 MG tablet  albuterol (ALBUTEROL) 108 (90 BASE) MCG/ACT Inhaler  beclomethasone (QVAR) 40 MCG/ACT Inhaler  oxyCODONE (ROXICODONE) 5 MG IR tablet  ibuprofen (ADVIL/MOTRIN) 400 MG tablet  senna-docusate (SENOKOT-S;PERICOLACE) 8.6-50 MG per tablet  acetaminophen (TYLENOL) 325 MG tablet  levothyroxine (SYNTHROID/LEVOTHROID) 100 MCG tablet  Alum & Mag Hydroxide-Simeth 200-200-20 MG CHEW  docusate sodium (COLACE) 100 MG tablet  polyethylene glycol (MIRALAX) powder  Elastic Bandages & Supports (ABDOMINAL BINDER/ELASTIC 3XL) MISC  levothyroxine (SYNTHROID,LEVOTHROID) 100 MCG tablet  albuterol (2.5 MG/3ML) 0.083% nebulizer solution  albuterol (PROAIR HFA, PROVENTIL HFA, VENTOLIN HFA) 108 (90 BASE) MCG/ACT inhaler  levothyroxine (SYNTHROID, LEVOTHROID) 88 MCG tablet  omeprazole (PRILOSEC) 40 MG capsule  Prenatal Vit-Fe Fumarate-FA (PRENATAL MULTIVITAMIN  PLUS IRON) 27-0.8 MG TABS  order for DME     Past Medical History:    GERD (gastroesophageal reflux disease)   Moderate persistent asthma   Thyroid disease   Gastritis  Rotator cuff syndrome  Vitamin D deficiency    Past Surgical History:    Cholecystectomy    Family History:    Diabetes: father    Social History:  Smoking Status: Never smoker  Smokeless Tobacco: never  used  Alcohol Use: no  Marital Status:   [2]     Review of Systems   Gastrointestinal: Positive for abdominal pain and nausea. Negative for diarrhea and vomiting.   Genitourinary: Negative.    All other systems reviewed and are negative.    Physical Exam   First Vitals:  Patient Vitals for the past 24 hrs:   BP Temp Temp src Pulse Resp SpO2   03/12/17 0810 - - - - - 98 %   03/12/17 0805 - - - - - 96 %   03/12/17 0800 - - - - - 98 %   03/12/17 0755 - - - - - 99 %   03/12/17 0750 129/89 - - - - 99 %   03/12/17 0737 147/85 97.8  F (36.6  C) Oral 81 18 99 %        Physical Exam  Vital signs and nursing notes reviewed.     Constitutional: laying on gurney appears uncomfortable  HENT: Oropharynx is clear and moist  Eyes: Conjunctivae are normal bilaterally. Pupils equal  Neck: normal range of motion  Cardiovascular: Normal rate, regular rhythm, normal heart sounds.   Pulmonary/Chest: Effort normal and breath sounds normal. No respiratory distress.   Abdominal: Soft. Bowel sounds are normal. Tenderness at the epigastric area to palpation.. No rebound or guarding or other areas of pain.   Musculoskeletal: No joint swelling or edema.   Neurological: Alert and oriented. No focal weakness  Skin: Skin is warm and dry. No rash noted.   Psych: normal affect    Emergency Department Course   ECG:  Indication: epigastric pain  Time: 0736  Vent. Rate 75 bpm. VT interval 172. QRS duration 94. QT/QTc 390/435. P-R-T axis 58 42 33.   Normal sinus rhythm. Normal ECG. Read time: 0740.    Imaging:  Radiographic findings were communicated with the patient who voiced understanding of the findings.    Abdominal xray:  No evidence for bowel obstruction or free air. There is  moderate stool throughout the colon. As per radiology.     Laboratory:  CBC: WBC: 4.6, HGB: 11.7, PLT: 204  BMP: Calcium 8.2(L), o/w WNL (Creat 0.58, glucose 86)  Hepatic panel: Albumin 3.1(L), AST 61(H), o/w WNL.  Lipase: 118    Interventions:  0809 Zofran 4 mg  IV  0810 GI Cocktail 30 mL PO    Emergency Department Course:  Nursing notes and vitals reviewed. I performed an exam of the patient as documented above.    EKG obtained in the ED, see results above.     The patient was sent for a abdomen xray while here in the emergency department, findings above.    Blood drawn. This was sent to the lab for further testing, results above.    I reassessed the patient.     Findings and plan explained to the Patient. Patient discharged home with instructions regarding supportive care, medications, and reasons to return. The importance of close follow-up was reviewed.     Impression & Plan      Medical Decision Making:  Gabi Roblero is a 37 year old female who presented with epigastric area pain.  Patient has a known history of gastritis versus GERD in the past. She stopped taking her antacid medication a few days ago. On exam, she had localized discomfort in the epigastric area. Her lab tests are unremarkable. Her symptoms were relieved with Gi Cocktail and Zofran. The patient does have an upper endoscopy in the next two weeks and I instructed that she should go back on her antacid medication. She was given Zofran as needed for nausea and she is aware she is to avoid NSAID medication. Patient was given discharge instructions and discharged home in improved condition. There is no indication of acute intraabdominal or surgical pathology to suggest need for further testing at this time.    Diagnosis:    ICD-10-CM    1. Abdominal pain, epigastric R10.13    2. Nausea R11.0      Disposition:  discharged to home  Discharge Medications:  New Prescriptions    ONDANSETRON (ZOFRAN) 4 MG TABLET    Take 1 tablet (4 mg) by mouth every 6 hours     ICynthia, am serving as a scribe on 3/12/2017 at 7:33 AM to personally document services performed by Nicolas Medina MD based on my observations and the provider's statements to me.     Cynthia Moreno  3/12/2017   [facility]  [facility] EMERGENCY DEPARTMENT       Nicolas Medina MD  03/12/17 7505

## 2017-03-13 LAB — INTERPRETATION ECG - MUSE: NORMAL

## 2017-03-20 ENCOUNTER — TRANSFERRED RECORDS (OUTPATIENT)
Dept: HEALTH INFORMATION MANAGEMENT | Facility: CLINIC | Age: 37
End: 2017-03-20

## 2017-04-10 ENCOUNTER — TELEPHONE (OUTPATIENT)
Dept: OBGYN | Facility: CLINIC | Age: 37
End: 2017-04-10

## 2017-04-10 NOTE — TELEPHONE ENCOUNTER
Through [facility] [phone] LMOVM for pt to CB    Please contact this patient and let her know that I'd like her to establish care with a PCP for management of her asthma. She has recently asked for refills of Qvar from our clinic and I'd like her to be seen and evaluated to maximize management and control of her symptoms.     Thank you,   MD Es Fitzgerald CMA

## 2017-04-16 ENCOUNTER — HOSPITAL ENCOUNTER (EMERGENCY)
Facility: CLINIC | Age: 37
Discharge: HOME | End: 2017-04-16
Attending: EMERGENCY MEDICINE | Admitting: EMERGENCY MEDICINE
Payer: COMMERCIAL

## 2017-04-16 ENCOUNTER — APPOINTMENT (OUTPATIENT)
Dept: GENERAL RADIOLOGY | Facility: CLINIC | Age: 37
End: 2017-04-16
Attending: EMERGENCY MEDICINE
Payer: COMMERCIAL

## 2017-04-16 VITALS
BODY MASS INDEX: 48.04 KG/M2 | TEMPERATURE: 98.5 F | RESPIRATION RATE: 20 BRPM | SYSTOLIC BLOOD PRESSURE: 118 MMHG | OXYGEN SATURATION: 98 % | DIASTOLIC BLOOD PRESSURE: 78 MMHG | HEART RATE: 87 BPM | WEIGHT: 293 LBS

## 2017-04-16 DIAGNOSIS — R10.84 ABDOMINAL PAIN, GENERALIZED: ICD-10-CM

## 2017-04-16 DIAGNOSIS — R11.2 NAUSEA AND VOMITING, INTRACTABILITY OF VOMITING NOT SPECIFIED, UNSPECIFIED VOMITING TYPE: ICD-10-CM

## 2017-04-16 LAB
ALBUMIN SERPL-MCNC: 3 G/DL (ref 3.4–5)
ALBUMIN UR-MCNC: NEGATIVE MG/DL
ALP SERPL-CCNC: 113 U/L (ref 40–150)
ALT SERPL W P-5'-P-CCNC: 29 U/L (ref 0–50)
ANION GAP SERPL CALCULATED.3IONS-SCNC: 6 MMOL/L (ref 3–14)
APPEARANCE UR: ABNORMAL
AST SERPL W P-5'-P-CCNC: 23 U/L (ref 0–45)
BASOPHILS # BLD AUTO: 0 10E9/L (ref 0–0.2)
BASOPHILS NFR BLD AUTO: 0.2 %
BILIRUB SERPL-MCNC: 0.4 MG/DL (ref 0.2–1.3)
BILIRUB UR QL STRIP: NEGATIVE
BUN SERPL-MCNC: 12 MG/DL (ref 7–30)
CALCIUM SERPL-MCNC: 8.1 MG/DL (ref 8.5–10.1)
CHLORIDE SERPL-SCNC: 106 MMOL/L (ref 94–109)
CO2 SERPL-SCNC: 28 MMOL/L (ref 20–32)
COLOR UR AUTO: YELLOW
CREAT SERPL-MCNC: 0.53 MG/DL (ref 0.52–1.04)
DIFFERENTIAL METHOD BLD: ABNORMAL
EOSINOPHIL # BLD AUTO: 0.6 10E9/L (ref 0–0.7)
EOSINOPHIL NFR BLD AUTO: 7.4 %
ERYTHROCYTE [DISTWIDTH] IN BLOOD BY AUTOMATED COUNT: 16.4 % (ref 10–15)
GFR SERPL CREATININE-BSD FRML MDRD: ABNORMAL ML/MIN/1.7M2
GLUCOSE SERPL-MCNC: 80 MG/DL (ref 70–99)
GLUCOSE UR STRIP-MCNC: NEGATIVE MG/DL
HCG UR QL: NEGATIVE
HCT VFR BLD AUTO: 43.3 % (ref 35–47)
HGB BLD-MCNC: 13.5 G/DL (ref 11.7–15.7)
HGB UR QL STRIP: NEGATIVE
IMM GRANULOCYTES # BLD: 0.1 10E9/L (ref 0–0.4)
IMM GRANULOCYTES NFR BLD: 0.6 %
KETONES UR STRIP-MCNC: NEGATIVE MG/DL
LEUKOCYTE ESTERASE UR QL STRIP: NEGATIVE
LIPASE SERPL-CCNC: 92 U/L (ref 73–393)
LYMPHOCYTES # BLD AUTO: 1.9 10E9/L (ref 0.8–5.3)
LYMPHOCYTES NFR BLD AUTO: 23.6 %
MCH RBC QN AUTO: 25.7 PG (ref 26.5–33)
MCHC RBC AUTO-ENTMCNC: 31.2 G/DL (ref 31.5–36.5)
MCV RBC AUTO: 83 FL (ref 78–100)
MONOCYTES # BLD AUTO: 0.5 10E9/L (ref 0–1.3)
MONOCYTES NFR BLD AUTO: 6.6 %
MUCOUS THREADS #/AREA URNS LPF: PRESENT /LPF
NEUTROPHILS # BLD AUTO: 5 10E9/L (ref 1.6–8.3)
NEUTROPHILS NFR BLD AUTO: 61.6 %
NITRATE UR QL: NEGATIVE
NRBC # BLD AUTO: 0 10*3/UL
NRBC BLD AUTO-RTO: 0 /100
PH UR STRIP: 5 PH (ref 5–7)
PLATELET # BLD AUTO: 294 10E9/L (ref 150–450)
POTASSIUM SERPL-SCNC: 3.9 MMOL/L (ref 3.4–5.3)
PROT SERPL-MCNC: 7.7 G/DL (ref 6.8–8.8)
RBC # BLD AUTO: 5.25 10E12/L (ref 3.8–5.2)
RBC #/AREA URNS AUTO: 1 /HPF (ref 0–2)
SODIUM SERPL-SCNC: 140 MMOL/L (ref 133–144)
SP GR UR STRIP: 1.02 (ref 1–1.03)
SQUAMOUS #/AREA URNS AUTO: 5 /HPF (ref 0–1)
URN SPEC COLLECT METH UR: ABNORMAL
UROBILINOGEN UR STRIP-MCNC: 0 MG/DL (ref 0–2)
WBC # BLD AUTO: 8.1 10E9/L (ref 4–11)
WBC #/AREA URNS AUTO: 3 /HPF (ref 0–2)

## 2017-04-16 PROCEDURE — 81001 URINALYSIS AUTO W/SCOPE: CPT | Performed by: EMERGENCY MEDICINE

## 2017-04-16 PROCEDURE — 85025 COMPLETE CBC W/AUTO DIFF WBC: CPT | Performed by: EMERGENCY MEDICINE

## 2017-04-16 PROCEDURE — 96374 THER/PROPH/DIAG INJ IV PUSH: CPT

## 2017-04-16 PROCEDURE — 99285 EMERGENCY DEPT VISIT HI MDM: CPT | Mod: 25

## 2017-04-16 PROCEDURE — 83690 ASSAY OF LIPASE: CPT | Performed by: EMERGENCY MEDICINE

## 2017-04-16 PROCEDURE — 96375 TX/PRO/DX INJ NEW DRUG ADDON: CPT

## 2017-04-16 PROCEDURE — 25000128 H RX IP 250 OP 636: Performed by: EMERGENCY MEDICINE

## 2017-04-16 PROCEDURE — 74020 XR ABDOMEN 2 VW: CPT

## 2017-04-16 PROCEDURE — 81025 URINE PREGNANCY TEST: CPT | Performed by: EMERGENCY MEDICINE

## 2017-04-16 PROCEDURE — 80053 COMPREHEN METABOLIC PANEL: CPT | Performed by: EMERGENCY MEDICINE

## 2017-04-16 RX ORDER — ONDANSETRON 4 MG/1
4 TABLET, ORALLY DISINTEGRATING ORAL EVERY 8 HOURS PRN
Qty: 10 TABLET | Refills: 0 | Status: SHIPPED | OUTPATIENT
Start: 2017-04-16 | End: 2017-04-20

## 2017-04-16 RX ORDER — KETOROLAC TROMETHAMINE 15 MG/ML
15 INJECTION, SOLUTION INTRAMUSCULAR; INTRAVENOUS ONCE
Status: COMPLETED | OUTPATIENT
Start: 2017-04-16 | End: 2017-04-16

## 2017-04-16 RX ORDER — ONDANSETRON 2 MG/ML
4 INJECTION INTRAMUSCULAR; INTRAVENOUS ONCE
Status: COMPLETED | OUTPATIENT
Start: 2017-04-16 | End: 2017-04-16

## 2017-04-16 RX ORDER — MORPHINE SULFATE 4 MG/ML
4 INJECTION, SOLUTION INTRAMUSCULAR; INTRAVENOUS
Status: DISCONTINUED | OUTPATIENT
Start: 2017-04-16 | End: 2017-04-17 | Stop reason: HOSPADM

## 2017-04-16 RX ADMIN — ONDANSETRON 4 MG: 2 INJECTION INTRAMUSCULAR; INTRAVENOUS at 19:16

## 2017-04-16 RX ADMIN — MORPHINE SULFATE 4 MG: 4 INJECTION, SOLUTION INTRAMUSCULAR; INTRAVENOUS at 21:54

## 2017-04-16 RX ADMIN — KETOROLAC TROMETHAMINE 15 MG: 15 INJECTION, SOLUTION INTRAMUSCULAR; INTRAVENOUS at 19:16

## 2017-04-16 ASSESSMENT — ENCOUNTER SYMPTOMS
SHORTNESS OF BREATH: 0
COUGH: 1
VOMITING: 1
HEMATURIA: 0
DIFFICULTY URINATING: 0
FACIAL SWELLING: 1
FREQUENCY: 0
CONSTIPATION: 1
BLOOD IN STOOL: 0
CHILLS: 1
WHEEZING: 1
ABDOMINAL PAIN: 1
NAUSEA: 1
FEVER: 0
DYSURIA: 0

## 2017-04-16 NOTE — ED PROVIDER NOTES
"  History     Chief Complaint:  Abdominal Pain    The history is provided by the patient. A  was used (Friend here in the ED).      Gabi Roblero is a 37 year old female with a history of GERD, asthma, and thyroid disease who presents to the emergency department today for evaluation of abdominal pain. The patient reports that since yesterday she has been experiencing \"squeezing\" diffuse abdominal pain which has caused her to feel nauseated. She states that she has a history of constipation and had felt this was for the past few days and had a small soft stool last night and this morning after taking a stool softener. Furthermore, the patient notes that today she was throwing up and feeling chills, but no measured fever reported. Moreover, the patient notes diffuse rashes to her upper chest and arms and swelling to her face three days ago, but is unsure of any suspected allergens. Of note, the patient reports that she used to take thyroid medication, but has not taken it for some time as she has not had it. She also notes that she was supposed to have an endoscopy a month ago and this was normal per her report. She also notes she has been wheezing and coughing as well, but notes no shortness of breath She denies any changes in bladder habits, dark or bloody stools, or other symptoms at this time.     Allergies:  No Known Drug Allergies    Medications:    ondansetron (ZOFRAN) 4 MG tablet   beclomethasone (QVAR) 40 MCG/ACT Inhaler   albuterol (ALBUTEROL) 108 (90 BASE) MCG/ACT Inhaler   beclomethasone (QVAR) 40 MCG/ACT Inhaler   senna-docusate (SENOKOT-S;PERICOLACE) 8.6-50 MG per tablet   levothyroxine (SYNTHROID/LEVOTHROID) 100 MCG tablet   Alum & Mag Hydroxide-Simeth 200-200-20 MG CHEW   docusate sodium (COLACE) 100 MG tablet   polyethylene glycol (MIRALAX) powder   Elastic Bandages & Supports (ABDOMINAL BINDER/ELASTIC 3XL) MISC   levothyroxine (SYNTHROID,LEVOTHROID) 100 MCG tablet "   albuterol (PROAIR HFA, PROVENTIL HFA, VENTOLIN HFA) 108 (90 BASE) MCG/ACT inhaler   levothyroxine (SYNTHROID, LEVOTHROID) 88 MCG tablet   omeprazole (PRILOSEC) 40 MG capsule     Past Medical History:    GERD  Moderate persistent asthma  Thyroid disease    Past Surgical History:    Cholecystectomy (2003)    Family History:    Father - Diabetes    Social History:  The patient was accompanied to the ED by her friend.  Smoking Status: Negative  Alcohol Use: Negative  Marital Status:   [2]    Review of Systems   Constitutional: Positive for chills. Negative for fever.   HENT: Positive for facial swelling.    Respiratory: Positive for cough and wheezing. Negative for shortness of breath.    Gastrointestinal: Positive for abdominal pain (diffuse), constipation, nausea and vomiting. Negative for blood in stool.   Genitourinary: Negative for difficulty urinating, dysuria, frequency, hematuria and urgency.   Skin: Positive for rash (Left upper arm and chest).   Allergic/Immunologic:        Unknown allergic reaction   All other systems reviewed and are negative.    Physical Exam   Vitals:  Patient Vitals for the past 24 hrs:   BP Temp Temp src Pulse Resp SpO2 Weight   04/16/17 2235 118/78 - - 87 20 - -   04/16/17 1759 130/90 98.5  F (36.9  C) Oral 88 18 98 % 135 kg (297 lb 9.9 oz)     Physical Exam  Nursing note and vitals reviewed.  Constitutional: Cooperative.   HENT:   Mouth/Throat: Moist mucous membranes.   Eyes: EOMI, nonicteric sclera  Cardiovascular: Normal rate, regular rhythm, no murmurs, rubs, or gallops  Pulmonary/Chest: Effort normal and breath sounds normal. No respiratory distress. No wheezes. No rales.   Abdominal: Soft. Nontender, nondistended, no guarding or rigidity. BS present.   Musculoskeletal: Normal range of motion.   Neurological: Alert. Moves all extremities spontaneously.   Skin: Skin is warm and dry. No rash noted.   Psychiatric: Normal mood and affect.     Emergency Department Course      Imaging:  Radiology findings were communicated with the patient who voiced understanding of the findings.    Abdomen x-ray, 2 views:  Nonobstructed bowel gas pattern  Reading per radiology    Laboratory:  Laboratory findings were communicated with the patient who voiced understanding of the findings.    CBC: WNL. (WBC 8.1, HGB 13.5, )   CMP: Calcium: 8.1 (L), Albumin: 3.0 (L) (Creatinine 0.53)  Lipase: 92  UA: WBC/HPF: 3 (H), Squamous Cells: 5 (H), Mucous Present (A)  HCG Qualitative Urine: Negative    Interventions:  1916 Zofran 4 mg IV  1916 Toradol 15 mg IV  2154 Morphine 4 mg IV    Emergency Department Course:  Nursing notes and vitals reviewed.  I performed an exam of the patient as documented above.   The patient was sent for an abdomen x-ray while in the emergency department, results above.   IV was inserted and blood was drawn for laboratory testing, results above.  At 2301 the patient was rechecked and was updated on the results of her laboratory and imaging studies.   I discussed the treatment plan with the patient. She expressed understanding of this plan and consented to discharge. She will be discharged home with instructions for care and follow up. In addition, the patient will return to the emergency department if their symptoms persist, worsen, if new symptoms arise or if there is any concern.  All questions were answered.  I personally reviewed the laboratory and imaging results with the patient and answered all related questions prior to discharge.    Impression & Plan      Medical Decision Making:  This patient presented to the Emergency Department with Abdominal Pain.  The clinical exam today is non-specific and non-focal and non-surgical.  The laboratory testing has returned unremarkable. Abdominal x-ray is noted to be normal with no obstructed bowel gas pattern.  The exact etiology of the abdominal pain is not clear.  The differential diagnosis of abdominal pain is broad and  includes: Appendicitis, Bowel Obstruction, Ulcer, Ischemia, Cholecystitis, Diverticulitis, Pancreatitis, UTI, Pyelonephritis, Enteritis/Colitis, AAA amongst many other etiologies.  The history, physical exam, and results detect no life threatening cause at this time, nor do they indicate the patient is currently suffering from one of the previously mentioned conditions.  Unfortunately a clear exam and results today do not ensure freedom from a severe disease process in the future-- even within hours, or the possibility that there is a dangerous process currently at work but currently undetected or undiagnosed.  For this reason the patient is advised to seek immediate re-evaluation in the ED if there is a worsening of the condition, and to be seen by a more consistent care-giver, such as their PMD. Pt reports that this problem is chronic in nature and she has been worked up by GI in the past, though only had EGD and no colonoscopy. Discussed utility of imaging for a long-term problem such as this and pt understands reasons for not proceeding. Pt also has a nonspecific rash that has also been ongoing long term. Consistent with hives/allergic rxn overall. Uncertain etiology of this chronic complaint as well. She is in stable condition at the time of discharge, indications for return to the ED were discussed as well as follow up. All questions were answered and she is in agreement with the plan.    Diagnosis:    ICD-10-CM    1. Nausea and vomiting, intractability of vomiting not specified, unspecified vomiting type R11.2    2. Abdominal pain, generalized R10.84        Discharge Medications:  Discharge Medication List as of 4/16/2017 10:23 PM      START taking these medications    Details   ondansetron (ZOFRAN ODT) 4 MG ODT tab Take 1 tablet (4 mg) by mouth every 8 hours as needed for nausea, Disp-10 tablet, R-0, Local Print             Scribe Disclosure:  Pernell BOWLES, am serving as a scribe at 6:25 PM on 4/16/2017  to document services personally performed by Nicolas Jacobs MD, based on my observations and the provider's statements to me.    4/16/2017   [facility] EMERGENCY DEPARTMENT       Nicolas Jacobs MD  04/17/17 6429

## 2017-04-16 NOTE — ED AVS SNAPSHOT
[facility]    [address]    [facility] [address]    Phone:  [phone]    Fax:  [phone]                                       Gabi Roblero   MRN: [id number]    Department:  [facility]   Date of Visit:  4/16/2017           After Visit Summary Signature Page     I have received my discharge instructions, and my questions have been answered. I have discussed any challenges I see with this plan with the nurse or doctor.    ..........................................................................................................................................  Patient/Patient Representative Signature      ..........................................................................................................................................  Patient Representative Print Name and Relationship to Patient    ..................................................               ................................................  Date                                            Time    ..........................................................................................................................................  Reviewed by Signature/Title    ...................................................              ..............................................  Date                                                            Time

## 2017-04-16 NOTE — ED NOTES
Pt complains of lower abd pain x2 days. Vomiting.   Neighbor providing interpretation.     Airway, breathing and circulation intact without need for intervention. Alert and interacting appropriately for age and situation.    HOME MEDICATIONS:  List in EPIC is correct.

## 2017-04-16 NOTE — ED AVS SNAPSHOT
[facility] Emergency Department    [address] [facility] [address]    Phone:  [phone]    Fax:  [phone]                                       Gabi Roblero   MRN: [id number]    Department:  [facility] Emergency Department   Date of Visit:  4/16/2017           Patient Information     Date Of Birth          1980        Your diagnoses for this visit were:     Nausea and vomiting, intractability of vomiting not specified, unspecified vomiting type     Abdominal pain, generalized        You were seen by Nicolas Jacobs MD.      Follow-up Information     Call Dhruv Andino MD.    Specialty:  Internal Medicine    Why:  call tomorrow to schedule follow-up appt for this week     Contact information:    [facility]  [address] [facility] [address]  [phone]          Follow up with [facility] Emergency [facility].    Specialty:  EMERGENCY MEDICINE    Why:  As needed, If symptoms worsen    Contact information:    [address] [facility] [address]  [phone]        Discharge Instructions         *Abdominal Pain, Unknown Cause (Female)    The exact cause of your abdominal (stomach) pain is not certain. This does not mean that this is something to worry about, or the right tests were not done. Everyone likes to know the exact cause of the problem, but sometimes with abdominal pain, there is no clear-cut cause, and this could be a good thing. The good news is that your symptoms can be treated, and you will feel better.   Your condition does not seem serious now; however, sometimes the signs of a serious problem may take more time to appear. For this reason, it is important for you to watch for any new symptoms, problems, or worsening of your condition.  Over the next few days, the abdominal pain may come and go, or be continuous. Other common symptoms can include nausea and vomiting.  Sometimes it can be difficult to tell if you feel nauseous, you may just feel bad and not associate that feeling with nausea. Constipation, diarrhea, and a fever may go along with the pain.  The pain may continue even if treated correctly over the following days. Depending on how things go, sometimes the cause can become clear and may require further or different treatment. Additional evaluations, medications, or tests may be needed.  Home care  Your health care provider may prescribe medications for pain, symptoms, or an infection.  Follow the health care provider's instructions for taking these medications.  General care    Rest until your next exam. No strenuous activities.    Try to find positions that ease discomfort. A small pillow placed on the abdomen may help relieve pain.    Something warm on your abdomen (such as a heating pad) may help, but be careful not to burn yourself.  Diet    Do not force yourself to eat, especially if having cramps, vomiting, or diarrhea.    Water is important so you do not get dehydrated. Soup may also be good. Sports drinks may also help, especially if they are not too acidic. Make sure you don't drink sugary drinks as this can make things worse. Take liquids in small amounts. Do not guzzle them.    Caffeine sometimes makes the pain and cramping worse.    Avoid dairy products if you have vomiting or diarrhea.    Don't eat large amounts at a time. Wait a few minutes between bites.    Eat a diet low in fiber (called a low-residue diet). Foods allowed include refined breads, white rice, fruit and vegetable juices without pulp, tender meats. These foods will pass more easily through the intestine.    Avoid fried or fatty foods, dairy, alcohol and spicy foods until your symptoms go away.  Follow-up care  Follow up with your health care provider as instructed, or if your pain does not begin to improve in the next 24 hours.  When to seek medical care  Seek prompt medical care if any of  the following occur:    Pain gets worse or moves to the right lower abdomen    New or worsening vomiting or diarrhea    Swelling of the abdomen    Unable to pass stool for more than three days    New fever over 101  F (38.3 C), or rising fever    Blood in vomit or bowel movements (dark red or black color)    Jaundice (yellow color of eyes and skin)    Weakness, dizziness    Chest, arm, back, neck or jaw pain    Unexpected vaginal bleeding or missed period  Call 911  Call emergency services if any of the following occur:    Trouble breathing    Confusion    Fainting or loss of consciousness    Rapid heart rate    Seizure    [id number] Tona Bunch, [address] [place] [address], [place] [address]. All rights reserved. This information is not intended as a substitute for professional medical care. Always follow your healthcare professional's instructions.          24 Hour Appointment Hotline       To make an appointment at any [facility] ([facility]), call [phone] ([phone]). If you don't have a family doctor or clinic, we will help you find one. [facility] are conveniently located to serve the needs of you and your family.             Review of your medicines      START taking        Dose / Directions Last dose taken    ondansetron 4 MG ODT tab   Commonly known as:  ZOFRAN ODT   Dose:  4 mg   Quantity:  10 tablet        Take 1 tablet (4 mg) by mouth every 8 hours as needed for nausea   Refills:  0          Our records show that you are taking the medicines listed below. If these are incorrect, please call your family doctor or clinic.        Dose / Directions Last dose taken    Abdominal Binder/Elastic 3XL Misc   Dose:  1 Device   Quantity:  1 each        1 Device daily   Refills:  1        acetaminophen 325 MG tablet   Commonly known as:  TYLENOL   Dose:  650 mg   Quantity:  100 tablet        Take 2 tablets (650 mg) by mouth every 4 hours as needed for mild pain or fever (greater than or equal to 38? C /100.4?  F (oral) or 38.5? C/ 101.4? F (core).)   Refills:  1        * albuterol (2.5 MG/3ML) 0.083% neb solution   Dose:  1 vial   Quantity:  30 vial        Take 1 vial (2.5 mg) by nebulization every 6 hours as needed for shortness of breath / dyspnea or wheezing   Refills:  11        * albuterol 108 (90 BASE) MCG/ACT Inhaler   Commonly known as:  PROAIR HFA/PROVENTIL HFA/VENTOLIN HFA   Dose:  2 puff   Quantity:  1 Inhaler        Inhale 2 puffs into the lungs every 6 hours   Refills:  11        * albuterol 108 (90 BASE) MCG/ACT Inhaler   Commonly known as:  albuterol   Dose:  2 puff   Quantity:  1 Inhaler        Inhale 2 puffs into the lungs every 4 hours as needed for shortness of breath / dyspnea   Refills:  0        Alum & Mag Hydroxide-Simeth 200-200-20 MG Chew   Dose:  1-2 tablet   Quantity:  480 tablet        Take 1-2 tablets by mouth every 4 hours as needed   Refills:  0        * beclomethasone 40 MCG/ACT Inhaler   Commonly known as:  QVAR   Dose:  2 puff   Quantity:  1 Inhaler        Inhale 2 puffs into the lungs 2 times daily   Refills:  0        * beclomethasone 40 MCG/ACT Inhaler   Commonly known as:  QVAR   Dose:  2 puff   Quantity:  1 Inhaler        Inhale 2 puffs into the lungs 2 times daily   Refills:  1        docusate sodium 100 MG tablet   Commonly known as:  COLACE   Dose:  100 mg   Quantity:  60 tablet        Take 100 mg by mouth daily   Refills:  1        ibuprofen 400 MG tablet   Commonly known as:  ADVIL/MOTRIN   Dose:  800 mg   Quantity:  120 tablet        Take 2 tablets (800 mg) by mouth every 6 hours as needed for other (cramping)   Refills:  0        * levothyroxine 88 MCG tablet   Commonly known as:  SYNTHROID/LEVOTHROID   Dose:  88 mcg   Quantity:  30 tablet        Take 1 tablet (88 mcg) by mouth daily   Refills:  1        * levothyroxine 100 MCG tablet   Commonly known as:  SYNTHROID/LEVOTHROID   Dose:  100 mcg   Quantity:  90 tablet        Take 1 tablet (100 mcg) by mouth daily   Refills:  1         * levothyroxine 100 MCG tablet   Commonly known as:  SYNTHROID/LEVOTHROID   Dose:  100 mcg   Quantity:  90 tablet        Take 1 tablet (100 mcg) by mouth daily   Refills:  3        omeprazole 40 MG capsule   Commonly known as:  priLOSEC   Dose:  40 mg   Quantity:  120 capsule        Take 1 capsule (40 mg) by mouth daily   Refills:  11        ondansetron 4 MG tablet   Commonly known as:  ZOFRAN   Dose:  4 mg   Quantity:  8 tablet        Take 1 tablet (4 mg) by mouth every 6 hours   Refills:  0        * order for DME   Quantity:  1 Device        Equipment being ordered: Nebulizer   Refills:  0        * order for DME   Quantity:  1 each        Equipment being ordered: abdominal binder   Refills:  0        polyethylene glycol powder   Commonly known as:  MIRALAX   Dose:  1 capful   Quantity:  510 g        Take 17 g (1 capful) by mouth daily   Refills:  11        prenatal multivitamin  plus iron 27-0.8 MG Tabs per tablet   Dose:  1 tablet   Quantity:  100 tablet        Take 1 tablet by mouth daily   Refills:  3        senna-docusate 8.6-50 MG per tablet   Commonly known as:  SENOKOT-S;PERICOLACE   Dose:  1-2 tablet   Quantity:  100 tablet        Take 1-2 tablets by mouth 2 times daily   Refills:  1        * Notice:  This list has 10 medication(s) that are the same as other medications prescribed for you. Read the directions carefully, and ask your doctor or other care provider to review them with you.            Prescriptions were sent or printed at these locations (1 Prescription)                   Other Prescriptions                Printed at Department/Unit printer (1 of 1)         ondansetron (ZOFRAN ODT) 4 MG ODT tab                Procedures and tests performed during your visit     Abdomen XR, 2 vw, flat and upright    CBC with platelets differential    Comprehensive metabolic panel    HCG qualitative urine    Lipase    UA with Microscopic reflex to Culture      Orders Needing Specimen Collection     None       Pending Results     Date and Time Order Name Status Description    4/16/2017 1840 Abdomen XR, 2 vw, flat and upright Preliminary             Pending Culture Results     No orders found from 4/14/2017 to 4/17/2017.            Test Results From Your Hospital Stay        4/16/2017  9:53 PM      Component Results     Component Value Ref Range & Units Status    HCG Qual Urine Negative NEG Final         4/16/2017  8:54 PM      Narrative     ABDOMEN TWO-THREE VIEW  4/16/2017 8:19 PM     HISTORY: Lower cramping abdominal pain.    COMPARISON: 3/12/2017.    FINDINGS: No free air. There are no air filled distended loops of  small bowel. The colon is not distended. The lung bases are  unremarkable.        Impression     IMPRESSION: Nonobstructed bowel gas pattern.         4/16/2017 10:00 PM      Component Results     Component Value Ref Range & Units Status    Color Urine Yellow  Final    Appearance Urine Slightly Cloudy  Final    Glucose Urine Negative NEG mg/dL Final    Bilirubin Urine Negative NEG Final    Ketones Urine Negative NEG mg/dL Final    Specific Gravity Urine 1.025 1.003 - 1.035 Final    Blood Urine Negative NEG Final    pH Urine 5.0 5.0 - 7.0 pH Final    Protein Albumin Urine Negative NEG mg/dL Final    Urobilinogen mg/dL 0.0 0.0 - 2.0 mg/dL Final    Nitrite Urine Negative NEG Final    Leukocyte Esterase Urine Negative NEG Final    Source Midstream Urine  Final    WBC Urine 3 (H) 0 - 2 /HPF Final    RBC Urine 1 0 - 2 /HPF Final    Squamous Epithelial /HPF Urine 5 (H) 0 - 1 /HPF Final    Mucous Urine Present (A) NEG /LPF Final         4/16/2017  8:00 PM      Component Results     Component Value Ref Range & Units Status    WBC 8.1 4.0 - 11.0 10e9/L Final    RBC Count 5.25 (H) 3.8 - 5.2 10e12/L Final    Hemoglobin 13.5 11.7 - 15.7 g/dL Final    Hematocrit 43.3 35.0 - 47.0 % Final    MCV 83 78 - 100 fl Final    MCH 25.7 (L) 26.5 - 33.0 pg Final    MCHC 31.2 (L) 31.5 - 36.5 g/dL Final    RDW 16.4 (H) 10.0 - 15.0  % Final    Platelet Count 294 150 - 450 10e9/L Final    Diff Method Automated Method  Final    % Neutrophils 61.6 % Final    % Lymphocytes 23.6 % Final    % Monocytes 6.6 % Final    % Eosinophils 7.4 % Final    % Basophils 0.2 % Final    % Immature Granulocytes 0.6 % Final    Nucleated RBCs 0 0 /100 Final    Absolute Neutrophil 5.0 1.6 - 8.3 10e9/L Final    Absolute Lymphocytes 1.9 0.8 - 5.3 10e9/L Final    Absolute Monocytes 0.5 0.0 - 1.3 10e9/L Final    Absolute Eosinophils 0.6 0.0 - 0.7 10e9/L Final    Absolute Basophils 0.0 0.0 - 0.2 10e9/L Final    Abs Immature Granulocytes 0.1 0 - 0.4 10e9/L Final    Absolute Nucleated RBC 0.0  Final         4/16/2017  8:09 PM      Component Results     Component Value Ref Range & Units Status    Sodium 140 133 - 144 mmol/L Final    Potassium 3.9 3.4 - 5.3 mmol/L Final    Chloride 106 94 - 109 mmol/L Final    Carbon Dioxide 28 20 - 32 mmol/L Final    Anion Gap 6 3 - 14 mmol/L Final    Glucose 80 70 - 99 mg/dL Final    Urea Nitrogen 12 7 - 30 mg/dL Final    Creatinine 0.53 0.52 - 1.04 mg/dL Final    GFR Estimate >90  Non  GFR Calc   >60 mL/min/1.7m2 Final    GFR Estimate If Black >90   GFR Calc   >60 mL/min/1.7m2 Final    Calcium 8.1 (L) 8.5 - 10.1 mg/dL Final    Bilirubin Total 0.4 0.2 - 1.3 mg/dL Final    Albumin 3.0 (L) 3.4 - 5.0 g/dL Final    Protein Total 7.7 6.8 - 8.8 g/dL Final    Alkaline Phosphatase 113 40 - 150 U/L Final    ALT 29 0 - 50 U/L Final    AST 23 0 - 45 U/L Final         4/16/2017  8:09 PM      Component Results     Component Value Ref Range & Units Status    Lipase 92 73 - 393 U/L Final                Clinical Quality Measure: Blood Pressure Screening     Your blood pressure was checked while you were in the emergency department today. The last reading we obtained was  BP: 130/90 . Please read the guidelines below about what these numbers mean and what you should do about them.  If your systolic blood pressure (the top  "number) is less than 120 and your diastolic blood pressure (the bottom number) is less than 80, then your blood pressure is normal. There is nothing more that you need to do about it.  If your systolic blood pressure (the top number) is 120-139 or your diastolic blood pressure (the bottom number) is 80-89, your blood pressure may be higher than it should be. You should have your blood pressure rechecked within a year by a primary care provider.  If your systolic blood pressure (the top number) is 140 or greater or your diastolic blood pressure (the bottom number) is 90 or greater, you may have high blood pressure. High blood pressure is treatable, but if left untreated over time it can put you at risk for heart attack, stroke, or kidney failure. You should have your blood pressure rechecked by a primary care provider within the next 4 weeks.  If your provider in the emergency department today gave you specific instructions to follow-up with your doctor or provider even sooner than that, you should follow that instruction and not wait for up to 4 weeks for your follow-up visit.        Thank you for choosing [facility]       Thank you for choosing [facility] for your care. Our goal is always to provide you with excellent care. Hearing back from our patients is one way we can continue to improve our services. Please take a few minutes to complete the written survey that you may receive in the mail after you visit with us. Thank you!        eSnips Information     eSnips lets you send messages to your doctor, view your test results, renew your prescriptions, schedule appointments and more. To sign up, go to [email or web address] . Click on \"Log in\" on the left side of the screen, which will take you to the Welcome page. Then click on \"Sign up Now\" on the right side of the page.     You will be asked to enter the access code listed below, as well as some personal information. Please follow the directions to create your " username and password.     Your access code is: [email or web address]  Expires: 6/10/2017  9:05 AM     Your access code will  in 90 days. If you need help or a new code, please call your [facility] clinic or [phone].        Care EveryWhere ID     This is your Care EveryWhere ID. This could be used by other organizations to access your [facility] medical records  [id number]        After Visit Summary       This is your record. Keep this with you and show to your community pharmacist(s) and doctor(s) at your next visit.

## 2017-04-17 NOTE — ED NOTES
Discharge instructions reviewed with patient.   Patient verbalizes her understanding through the aid of an interpretor who is present in the room.   Discharge prescriptions also reviewed.  DC to home per order.  All questions answered.

## 2017-04-17 NOTE — DISCHARGE INSTRUCTIONS
*Abdominal Pain, Unknown Cause (Female)    The exact cause of your abdominal (stomach) pain is not certain. This does not mean that this is something to worry about, or the right tests were not done. Everyone likes to know the exact cause of the problem, but sometimes with abdominal pain, there is no clear-cut cause, and this could be a good thing. The good news is that your symptoms can be treated, and you will feel better.   Your condition does not seem serious now; however, sometimes the signs of a serious problem may take more time to appear. For this reason, it is important for you to watch for any new symptoms, problems, or worsening of your condition.  Over the next few days, the abdominal pain may come and go, or be continuous. Other common symptoms can include nausea and vomiting. Sometimes it can be difficult to tell if you feel nauseous, you may just feel bad and not associate that feeling with nausea. Constipation, diarrhea, and a fever may go along with the pain.  The pain may continue even if treated correctly over the following days. Depending on how things go, sometimes the cause can become clear and may require further or different treatment. Additional evaluations, medications, or tests may be needed.  Home care  Your health care provider may prescribe medications for pain, symptoms, or an infection.  Follow the health care provider's instructions for taking these medications.  General care    Rest until your next exam. No strenuous activities.    Try to find positions that ease discomfort. A small pillow placed on the abdomen may help relieve pain.    Something warm on your abdomen (such as a heating pad) may help, but be careful not to burn yourself.  Diet    Do not force yourself to eat, especially if having cramps, vomiting, or diarrhea.    Water is important so you do not get dehydrated. Soup may also be good. Sports drinks may also help, especially if they are not too acidic. Make sure you  don't drink sugary drinks as this can make things worse. Take liquids in small amounts. Do not guzzle them.    Caffeine sometimes makes the pain and cramping worse.    Avoid dairy products if you have vomiting or diarrhea.    Don't eat large amounts at a time. Wait a few minutes between bites.    Eat a diet low in fiber (called a low-residue diet). Foods allowed include refined breads, white rice, fruit and vegetable juices without pulp, tender meats. These foods will pass more easily through the intestine.    Avoid fried or fatty foods, dairy, alcohol and spicy foods until your symptoms go away.  Follow-up care  Follow up with your health care provider as instructed, or if your pain does not begin to improve in the next 24 hours.  When to seek medical care  Seek prompt medical care if any of the following occur:    Pain gets worse or moves to the right lower abdomen    New or worsening vomiting or diarrhea    Swelling of the abdomen    Unable to pass stool for more than three days    New fever over 101  F (38.3 C), or rising fever    Blood in vomit or bowel movements (dark red or black color)    Jaundice (yellow color of eyes and skin)    Weakness, dizziness    Chest, arm, back, neck or jaw pain    Unexpected vaginal bleeding or missed period  Call 911  Call emergency services if any of the following occur:    Trouble breathing    Confusion    Fainting or loss of consciousness    Rapid heart rate    Seizure    [phone] [facility], [address] [place] [address], [place] [address]. All rights reserved. This information is not intended as a substitute for professional medical care. Always follow your healthcare professional's instructions.

## 2017-04-20 ENCOUNTER — APPOINTMENT (OUTPATIENT)
Dept: CT IMAGING | Facility: CLINIC | Age: 37
End: 2017-04-20
Attending: EMERGENCY MEDICINE
Payer: COMMERCIAL

## 2017-04-20 ENCOUNTER — HOSPITAL ENCOUNTER (EMERGENCY)
Facility: CLINIC | Age: 37
Discharge: HOME | End: 2017-04-20
Attending: EMERGENCY MEDICINE | Admitting: EMERGENCY MEDICINE
Payer: COMMERCIAL

## 2017-04-20 ENCOUNTER — APPOINTMENT (OUTPATIENT)
Dept: ULTRASOUND IMAGING | Facility: CLINIC | Age: 37
End: 2017-04-20
Attending: EMERGENCY MEDICINE
Payer: COMMERCIAL

## 2017-04-20 VITALS
TEMPERATURE: 99.8 F | RESPIRATION RATE: 18 BRPM | OXYGEN SATURATION: 98 % | DIASTOLIC BLOOD PRESSURE: 73 MMHG | HEART RATE: 69 BPM | SYSTOLIC BLOOD PRESSURE: 114 MMHG

## 2017-04-20 DIAGNOSIS — R11.2 NON-INTRACTABLE VOMITING WITH NAUSEA, UNSPECIFIED VOMITING TYPE: ICD-10-CM

## 2017-04-20 LAB
ALBUMIN SERPL-MCNC: 3.2 G/DL (ref 3.4–5)
ALP SERPL-CCNC: 126 U/L (ref 40–150)
ALT SERPL W P-5'-P-CCNC: 41 U/L (ref 0–50)
ANION GAP SERPL CALCULATED.3IONS-SCNC: 10 MMOL/L (ref 3–14)
AST SERPL W P-5'-P-CCNC: 20 U/L (ref 0–45)
BASOPHILS # BLD AUTO: 0 10E9/L (ref 0–0.2)
BASOPHILS NFR BLD AUTO: 0.1 %
BILIRUB SERPL-MCNC: 0.3 MG/DL (ref 0.2–1.3)
BUN SERPL-MCNC: 12 MG/DL (ref 7–30)
CALCIUM SERPL-MCNC: 8.5 MG/DL (ref 8.5–10.1)
CHLORIDE SERPL-SCNC: 105 MMOL/L (ref 94–109)
CO2 SERPL-SCNC: 26 MMOL/L (ref 20–32)
CREAT SERPL-MCNC: 0.6 MG/DL (ref 0.52–1.04)
DIFFERENTIAL METHOD BLD: ABNORMAL
EOSINOPHIL # BLD AUTO: 1 10E9/L (ref 0–0.7)
EOSINOPHIL NFR BLD AUTO: 12.1 %
ERYTHROCYTE [DISTWIDTH] IN BLOOD BY AUTOMATED COUNT: 16 % (ref 10–15)
GFR SERPL CREATININE-BSD FRML MDRD: ABNORMAL ML/MIN/1.7M2
GLUCOSE SERPL-MCNC: 108 MG/DL (ref 70–99)
HCG SERPL QL: NEGATIVE
HCT VFR BLD AUTO: 38.8 % (ref 35–47)
HGB BLD-MCNC: 12.3 G/DL (ref 11.7–15.7)
IMM GRANULOCYTES # BLD: 0 10E9/L (ref 0–0.4)
IMM GRANULOCYTES NFR BLD: 0.5 %
LIPASE SERPL-CCNC: 129 U/L (ref 73–393)
LYMPHOCYTES # BLD AUTO: 1.5 10E9/L (ref 0.8–5.3)
LYMPHOCYTES NFR BLD AUTO: 18.2 %
MCH RBC QN AUTO: 26 PG (ref 26.5–33)
MCHC RBC AUTO-ENTMCNC: 31.7 G/DL (ref 31.5–36.5)
MCV RBC AUTO: 82 FL (ref 78–100)
MONOCYTES # BLD AUTO: 0.6 10E9/L (ref 0–1.3)
MONOCYTES NFR BLD AUTO: 6.8 %
NEUTROPHILS # BLD AUTO: 5 10E9/L (ref 1.6–8.3)
NEUTROPHILS NFR BLD AUTO: 62.3 %
NRBC # BLD AUTO: 0 10*3/UL
NRBC BLD AUTO-RTO: 0 /100
PLATELET # BLD AUTO: 277 10E9/L (ref 150–450)
POTASSIUM SERPL-SCNC: 3.5 MMOL/L (ref 3.4–5.3)
PROT SERPL-MCNC: 7.3 G/DL (ref 6.8–8.8)
RBC # BLD AUTO: 4.73 10E12/L (ref 3.8–5.2)
SODIUM SERPL-SCNC: 141 MMOL/L (ref 133–144)
WBC # BLD AUTO: 8.1 10E9/L (ref 4–11)

## 2017-04-20 PROCEDURE — 36415 COLL VENOUS BLD VENIPUNCTURE: CPT | Performed by: EMERGENCY MEDICINE

## 2017-04-20 PROCEDURE — 76705 ECHO EXAM OF ABDOMEN: CPT

## 2017-04-20 PROCEDURE — 84703 CHORIONIC GONADOTROPIN ASSAY: CPT | Performed by: EMERGENCY MEDICINE

## 2017-04-20 PROCEDURE — 25000128 H RX IP 250 OP 636: Performed by: EMERGENCY MEDICINE

## 2017-04-20 PROCEDURE — 74177 CT ABD & PELVIS W/CONTRAST: CPT

## 2017-04-20 PROCEDURE — 85025 COMPLETE CBC W/AUTO DIFF WBC: CPT | Performed by: EMERGENCY MEDICINE

## 2017-04-20 PROCEDURE — 96374 THER/PROPH/DIAG INJ IV PUSH: CPT

## 2017-04-20 PROCEDURE — 80053 COMPREHEN METABOLIC PANEL: CPT | Performed by: EMERGENCY MEDICINE

## 2017-04-20 PROCEDURE — 96375 TX/PRO/DX INJ NEW DRUG ADDON: CPT

## 2017-04-20 PROCEDURE — 83690 ASSAY OF LIPASE: CPT | Performed by: EMERGENCY MEDICINE

## 2017-04-20 PROCEDURE — 25500064 ZZH RX 255 OP 636: Performed by: EMERGENCY MEDICINE

## 2017-04-20 PROCEDURE — 99285 EMERGENCY DEPT VISIT HI MDM: CPT | Mod: 25

## 2017-04-20 PROCEDURE — 96361 HYDRATE IV INFUSION ADD-ON: CPT

## 2017-04-20 RX ORDER — METOCLOPRAMIDE HYDROCHLORIDE 5 MG/ML
5 INJECTION INTRAMUSCULAR; INTRAVENOUS ONCE
Status: COMPLETED | OUTPATIENT
Start: 2017-04-20 | End: 2017-04-20

## 2017-04-20 RX ORDER — IOPAMIDOL 755 MG/ML
500 INJECTION, SOLUTION INTRAVASCULAR ONCE
Status: COMPLETED | OUTPATIENT
Start: 2017-04-20 | End: 2017-04-20

## 2017-04-20 RX ORDER — HYDROMORPHONE HYDROCHLORIDE 1 MG/ML
0.5 INJECTION, SOLUTION INTRAMUSCULAR; INTRAVENOUS; SUBCUTANEOUS
Status: DISCONTINUED | OUTPATIENT
Start: 2017-04-20 | End: 2017-04-20 | Stop reason: HOSPADM

## 2017-04-20 RX ORDER — SODIUM CHLORIDE 9 MG/ML
1000 INJECTION, SOLUTION INTRAVENOUS CONTINUOUS
Status: DISCONTINUED | OUTPATIENT
Start: 2017-04-20 | End: 2017-04-20 | Stop reason: HOSPADM

## 2017-04-20 RX ORDER — METOCLOPRAMIDE 5 MG/1
5 TABLET ORAL 3 TIMES DAILY PRN
Qty: 20 TABLET | Refills: 0 | Status: SHIPPED | OUTPATIENT
Start: 2017-04-20 | End: 2018-05-01

## 2017-04-20 RX ADMIN — HYDROMORPHONE HYDROCHLORIDE 0.5 MG: 1 INJECTION, SOLUTION INTRAMUSCULAR; INTRAVENOUS; SUBCUTANEOUS at 03:18

## 2017-04-20 RX ADMIN — IOPAMIDOL 100 ML: 755 INJECTION, SOLUTION INTRAVENOUS at 04:40

## 2017-04-20 RX ADMIN — SODIUM CHLORIDE 1000 ML: 9 INJECTION, SOLUTION INTRAVENOUS at 03:19

## 2017-04-20 RX ADMIN — SODIUM CHLORIDE 65 ML: 9 INJECTION, SOLUTION INTRAVENOUS at 04:52

## 2017-04-20 RX ADMIN — SODIUM CHLORIDE 1000 ML: 9 INJECTION, SOLUTION INTRAVENOUS at 05:20

## 2017-04-20 RX ADMIN — METOCLOPRAMIDE 5 MG: 5 INJECTION, SOLUTION INTRAMUSCULAR; INTRAVENOUS at 03:18

## 2017-04-20 ASSESSMENT — ENCOUNTER SYMPTOMS
DIARRHEA: 0
CONSTIPATION: 1
NAUSEA: 1
VOMITING: 1
FEVER: 0
COUGH: 0
ABDOMINAL PAIN: 1

## 2017-04-20 NOTE — ED AVS SNAPSHOT
[facility]    [address]    [facility] [address]    Phone:  [phone]    Fax:  [phone]                                       Gabi Roblero   MRN: [id number]    Department:  [facility]   Date of Visit:  4/20/2017           After Visit Summary Signature Page     I have received my discharge instructions, and my questions have been answered. I have discussed any challenges I see with this plan with the nurse or doctor.    ..........................................................................................................................................  Patient/Patient Representative Signature      ..........................................................................................................................................  Patient Representative Print Name and Relationship to Patient    ..................................................               ................................................  Date                                            Time    ..........................................................................................................................................  Reviewed by Signature/Title    ...................................................              ..............................................  Date                                                            Time

## 2017-04-20 NOTE — DISCHARGE INSTRUCTIONS
Get extra rest and drink plenty of fluids. You may take acetaminophen (Tylenol) 650mg or ibuprofen (Advil/Motrin) 600mg, up to every 6 hours, with food, for fevers/aches.     If you were prescribed medications for your symptoms you may use them as instructed.    See your primary care clinic for followup within the next 5-7 days or sooner if symptoms are not improving.    If you have any worsening/severe symptoms seek medical care right away.       Viral Gastroenteritis (Adult)    Gastroenteritis is commonly called the stomach flu. It is most often caused by a virus that affects the stomach and intestinal tract and usually lasts from 2 to 7 days. Common viruses causing gastroenteritis include norovirus, rotavirus, and hepatitis A. Non-viral causes of gastroenteritis include bacteria, parasites, and toxins.  The danger from repeated vomiting or diarrhea is dehydration. This is the loss of too much fluid from the body. When this occurs, body fluids must be replaced. Antibiotics do not help with this illness because it is usually viral.Simple home treatment will be helpful.  Symptoms of viral gastroenteritis may include:    Watery, loose stools    Stomach pain or abdominal cramps    Fever and chills    Nausea and vomiting    Loss of bowel control    Headache  Home care  Gastroenteritis is transmitted by contact with the stool or vomit of an infected person. This can occur from person to person or from contact with a contaminated surface.  Follow these guidelines when caring for yourself at home:    If symptoms are severe, rest at home for the next 24 hours or until you are feeling better.    Wash your hands with soap and water or use alcohol-based  to prevent the spread of infection. Wash your hands after touching anyone who is sick.    Wash your hands or use alcohol-based  after using the toilet and before meals. Clean the toilet after each use.  Remember these tips when preparing food:    People with  diarrhea should not prepare or serve food to others. When preparing foods, wash your hands before and after.    Wash your hands after using cutting boards, countertops, knives, or utensils that have been in contact with raw food.    Keep uncooked meats away from cooked and ready-to-eat foods.  Medicine  You may use acetaminophen or NSAID medicines like ibuprofen or naproxen to control fever unless another medicine was given. If you have chronic liver or kidney disease, talk with your healthcare provider before using these medicines. Also talk with your provider if you've had a stomach ulcer or gastrointestinal bleeding. Don't give aspirin to anyone under 18 years of age who is ill with a fever. It may cause severe liver damage. Don't use NSAIDS is you are already taking one for another condition (like arthritis) or are on aspirin (such as for heart disease or after a stroke).  If medicine for vomiting or diarrhea are prescribed, take these only as directed. Do not take over-the-counter medicines for vomiting or diarrhea unless instructed by your healthcare provider.  Diet  Follow these guidelines for food:    Water and liquids are important so you don't get dehydrated. Drink a small amount at a time or suck on ice chips if you are vomiting.    If you eat, avoid fatty, greasy, spicy, or fried foods.    Don't eat dairy if you have diarrhea. This can make diarrhea worse.    Avoid tobacco, alcohol, and caffeine which may worsen symptoms.  During the first 24 hours (the first full day), follow the diet below:    Beverages. Sports drinks, soft drinks without caffeine, ginger ale, mineral water (plain or flavored), decaffeinated tea and coffee. If you are very dehydrated, sports drinks aren't a good choice. They have too much sugar and not enough electrolytes. In this case, commercially available products called oral rehydration solutions, are best.    Soups. Eat clear broth, consommé, and bouillon.    Desserts. Eat  gelatin, popsicles, and fruit juice bars.  During the next 24 hours (the second day), you may add the following to the above:    Hot cereal, plain toast, bread, rolls, and crackers    Plain noodles, rice, mashed potatoes, chicken noodle or rice soup    Unsweetened canned fruit (avoid pineapple), bananas    Limit fat intake to less than 15 grams per day. Do this by avoiding margarine, butter, oils, mayonnaise, sauces, gravies, fried foods, peanut butter, meat, poultry, and fish.    Limit fiber and avoid raw or cooked vegetables, fresh fruits (except bananas), and bran cereals.    Limit caffeine and chocolate. Don't use spices or seasonings other than salt.    Limit dairy products.    Avoid alcohol.  During the next 24 hours:    Gradually resume a normal diet as you feel better and your symptoms improve.    If at any time it starts getting worse again, go back to clear liquids until you feel better.  Follow-up care  Follow up with your healthcare provider, or as advised. Call your provider if you don't get better within 24 hours or if diarrhea lasts more than a week. Also follow up if you are unable to keep down liquids and get dehydrated. If a stool (diarrhea) sample was taken, call as directed for the results.  Call 911  Call 911 if any of these occur:    Trouble breathing    Chest pain    Confused    Severe drowsiness or trouble awakening    Fainting or loss of consciousness    Rapid heart rate    Seizure    Stiff neck  When to seek medical advice  Call your healthcare provider right away if any of these occur:    Abdominal pain that gets worse    Continued vomiting (unable to keep liquids down)    Frequent diarrhea (more than 5 times a day)    Blood in vomit or stool (black or red color)    Dark urine, reduced urine output, or extreme thirst    Weakness or dizziness    Drowsiness    Fever of 100.4 F (38 C) oral or higher that does not get better with fever medicine    New rash    0150-2622 [employer]  "[employer] [address] [place] [id number]. All rights reserved. This information is not intended as a substitute for professional medical care. Always follow your healthcare professional's instructions.        How to Control Nausea and Vomiting     Taken before meals, medicines can help ease nausea.    Nausea is feeling that you need to throw up. Throwing up occurs when your body forces food that is in your stomach out through your mouth. Nausea and vomiting are symptoms that are caused by many things. They can happen when a condition or disease, medicine, medical treatment, or a poisonous substance affects the area in your brain that controls vomiting. Some conditions or diseases can cause nausea, abdominal pain or cramps, and vomiting. The symptoms can be mild and go away by themselves. Other symptoms can be serious. You will need to see your healthcare provider for these.  Nausea and vomiting are common. They can be caused by many things. These include:    \"Stomach flu\" (gastroenteritis)    Food poisoning    Stomach pain (gastritis)    Blockages  They can also be caused by a head injury, an infection in the brain or inside the ear, or migraines. Other common causes of nausea and vomiting include:    Brain tumor    Brain bruise    Motion sickness    Drugs. These include alcohol, pain medicines such as morphine, and cancer medicines.    Toxins. These are poisonous things like plants or liquids that are swallowed by accident.    Advanced types of cancer    Movement problems (psychogenic problems)    Extra pressure in the fluid that surrounds the brain and spinal cord (elevated intracranial pressure)     Nausea and vomiting are also common side effects of chemotherapy and radiation therapy. Side effects happen when treatment changes some normal cells as well as cancer cells. In this case, the cells lining your stomach and the part of your brain that controls vomiting are affected. Other more serious causes of " vomiting may be hard to find early in the illness.     When to seek medical advice  Call your healthcare provider right away if you have the following:    Nausea or vomiting that lasts 24 hours or more    Trouble keeping fluids down   Medicines can help  Nausea or vomiting can often be prevented or controlled with medicines (antiemetics). Your doctor may give you antiemetics before or after treatment if you are getting chemotherapy or other medical treatments that cause nausea or vomiting.  Eating tips    If you have medicines to control nausea, take them before meals as directed.    Avoid fatty or greasy foods while nauseated.    Eat small meals slowly throughout the day.    Ask someone to sit with you while you eat to keep you from thinking about feeling nauseated.    Eat foods at room temperature or colder to avoid strong smells.    Eat dry foods, such as toast, crackers, or pretzels. Also eat cool, light foods, such as applesauce, and bland foods, such as oatmeal or skinned chicken.   Other ways to feel better    Get a little fresh air. Take a short walk.    Talk to a friend, listen to music, or watch TV.    Take a few deep, slow breaths.    Eat by candlelight or in surroundings that you find relaxing.    Use a technique, such as guided imagery, to help you relax. Imagine yourself in a beautiful, restful scene. Or daydream about the place you d most like to be.    9156-4904 [employer]. [address] [place] [address], [place] [address]. All rights reserved. This information is not intended as a substitute for professional medical care. Always follow your healthcare professional's instructions.

## 2017-04-20 NOTE — ED AVS SNAPSHOT
[facility] Emergency Department    [address]    [facility] [address]    Phone:  [phone]    Fax:  [phone]                                       Gabi Roblero   MRN: [id number]    Department:  [facility] Emergency Department   Date of Visit:  4/20/2017           Patient Information     Date Of Birth          1980        Your diagnoses for this visit were:     Non-intractable vomiting with nausea, unspecified vomiting type        You were seen by Kelsey Thomas MD.      Follow-up Information     Schedule an appointment as soon as possible for a visit with Dhruv Andino MD.    Specialty:  Internal Medicine    Contact information:    [facility]  [address]  [facility] [address]  [phone]          Discharge Instructions       Get extra rest and drink plenty of fluids. You may take acetaminophen (Tylenol) 650mg or ibuprofen (Advil/Motrin) 600mg, up to every 6 hours, with food, for fevers/aches.     If you were prescribed medications for your symptoms you may use them as instructed.    See your primary care clinic for followup within the next 5-7 days or sooner if symptoms are not improving.    If you have any worsening/severe symptoms seek medical care right away.       Viral Gastroenteritis (Adult)    Gastroenteritis is commonly called the stomach flu. It is most often caused by a virus that affects the stomach and intestinal tract and usually lasts from 2 to 7 days. Common viruses causing gastroenteritis include norovirus, rotavirus, and hepatitis A. Non-viral causes of gastroenteritis include bacteria, parasites, and toxins.  The danger from repeated vomiting or diarrhea is dehydration. This is the loss of too much fluid from the body. When this occurs, body fluids must be replaced. Antibiotics do not help with this illness because it is usually viral.Simple home treatment will be helpful.  Symptoms of viral  gastroenteritis may include:    Watery, loose stools    Stomach pain or abdominal cramps    Fever and chills    Nausea and vomiting    Loss of bowel control    Headache  Home care  Gastroenteritis is transmitted by contact with the stool or vomit of an infected person. This can occur from person to person or from contact with a contaminated surface.  Follow these guidelines when caring for yourself at home:    If symptoms are severe, rest at home for the next 24 hours or until you are feeling better.    Wash your hands with soap and water or use alcohol-based  to prevent the spread of infection. Wash your hands after touching anyone who is sick.    Wash your hands or use alcohol-based  after using the toilet and before meals. Clean the toilet after each use.  Remember these tips when preparing food:    People with diarrhea should not prepare or serve food to others. When preparing foods, wash your hands before and after.    Wash your hands after using cutting boards, countertops, knives, or utensils that have been in contact with raw food.    Keep uncooked meats away from cooked and ready-to-eat foods.  Medicine  You may use acetaminophen or NSAID medicines like ibuprofen or naproxen to control fever unless another medicine was given. If you have chronic liver or kidney disease, talk with your healthcare provider before using these medicines. Also talk with your provider if you've had a stomach ulcer or gastrointestinal bleeding. Don't give aspirin to anyone under 18 years of age who is ill with a fever. It may cause severe liver damage. Don't use NSAIDS is you are already taking one for another condition (like arthritis) or are on aspirin (such as for heart disease or after a stroke).  If medicine for vomiting or diarrhea are prescribed, take these only as directed. Do not take over-the-counter medicines for vomiting or diarrhea unless instructed by your healthcare provider.  Diet  Follow these  guidelines for food:    Water and liquids are important so you don't get dehydrated. Drink a small amount at a time or suck on ice chips if you are vomiting.    If you eat, avoid fatty, greasy, spicy, or fried foods.    Don't eat dairy if you have diarrhea. This can make diarrhea worse.    Avoid tobacco, alcohol, and caffeine which may worsen symptoms.  During the first 24 hours (the first full day), follow the diet below:    Beverages. Sports drinks, soft drinks without caffeine, ginger ale, mineral water (plain or flavored), decaffeinated tea and coffee. If you are very dehydrated, sports drinks aren't a good choice. They have too much sugar and not enough electrolytes. In this case, commercially available products called oral rehydration solutions, are best.    Soups. Eat clear broth, consommé, and bouillon.    Desserts. Eat gelatin, popsicles, and fruit juice bars.  During the next 24 hours (the second day), you may add the following to the above:    Hot cereal, plain toast, bread, rolls, and crackers    Plain noodles, rice, mashed potatoes, chicken noodle or rice soup    Unsweetened canned fruit (avoid pineapple), bananas    Limit fat intake to less than 15 grams per day. Do this by avoiding margarine, butter, oils, mayonnaise, sauces, gravies, fried foods, peanut butter, meat, poultry, and fish.    Limit fiber and avoid raw or cooked vegetables, fresh fruits (except bananas), and bran cereals.    Limit caffeine and chocolate. Don't use spices or seasonings other than salt.    Limit dairy products.    Avoid alcohol.  During the next 24 hours:    Gradually resume a normal diet as you feel better and your symptoms improve.    If at any time it starts getting worse again, go back to clear liquids until you feel better.  Follow-up care  Follow up with your healthcare provider, or as advised. Call your provider if you don't get better within 24 hours or if diarrhea lasts more than a week. Also follow up if you are  "unable to keep down liquids and get dehydrated. If a stool (diarrhea) sample was taken, call as directed for the results.  Call 911  Call 911 if any of these occur:    Trouble breathing    Chest pain    Confused    Severe drowsiness or trouble awakening    Fainting or loss of consciousness    Rapid heart rate    Seizure    Stiff neck  When to seek medical advice  Call your healthcare provider right away if any of these occur:    Abdominal pain that gets worse    Continued vomiting (unable to keep liquids down)    Frequent diarrhea (more than 5 times a day)    Blood in vomit or stool (black or red color)    Dark urine, reduced urine output, or extreme thirst    Weakness or dizziness    Drowsiness    Fever of 100.4 F (38 C) oral or higher that does not get better with fever medicine    New rash    3765-5988 [employer]. [address] [place] [address], [place] [address]. All rights reserved. This information is not intended as a substitute for professional medical care. Always follow your healthcare professional's instructions.        How to Control Nausea and Vomiting     Taken before meals, medicines can help ease nausea.    Nausea is feeling that you need to throw up. Throwing up occurs when your body forces food that is in your stomach out through your mouth. Nausea and vomiting are symptoms that are caused by many things. They can happen when a condition or disease, medicine, medical treatment, or a poisonous substance affects the area in your brain that controls vomiting. Some conditions or diseases can cause nausea, abdominal pain or cramps, and vomiting. The symptoms can be mild and go away by themselves. Other symptoms can be serious. You will need to see your healthcare provider for these.  Nausea and vomiting are common. They can be caused by many things. These include:    \"Stomach flu\" (gastroenteritis)    Food poisoning    Stomach pain (gastritis)    Blockages  They can also be caused by a head " injury, an infection in the brain or inside the ear, or migraines. Other common causes of nausea and vomiting include:    Brain tumor    Brain bruise    Motion sickness    Drugs. These include alcohol, pain medicines such as morphine, and cancer medicines.    Toxins. These are poisonous things like plants or liquids that are swallowed by accident.    Advanced types of cancer    Movement problems (psychogenic problems)    Extra pressure in the fluid that surrounds the brain and spinal cord (elevated intracranial pressure)     Nausea and vomiting are also common side effects of chemotherapy and radiation therapy. Side effects happen when treatment changes some normal cells as well as cancer cells. In this case, the cells lining your stomach and the part of your brain that controls vomiting are affected. Other more serious causes of vomiting may be hard to find early in the illness.     When to seek medical advice  Call your healthcare provider right away if you have the following:    Nausea or vomiting that lasts 24 hours or more    Trouble keeping fluids down   Medicines can help  Nausea or vomiting can often be prevented or controlled with medicines (antiemetics). Your doctor may give you antiemetics before or after treatment if you are getting chemotherapy or other medical treatments that cause nausea or vomiting.  Eating tips    If you have medicines to control nausea, take them before meals as directed.    Avoid fatty or greasy foods while nauseated.    Eat small meals slowly throughout the day.    Ask someone to sit with you while you eat to keep you from thinking about feeling nauseated.    Eat foods at room temperature or colder to avoid strong smells.    Eat dry foods, such as toast, crackers, or pretzels. Also eat cool, light foods, such as applesauce, and bland foods, such as oatmeal or skinned chicken.   Other ways to feel better    Get a little fresh air. Take a short walk.    Talk to a friend, listen to  music, or watch TV.    Take a few deep, slow breaths.    Eat by candlelight or in surroundings that you find relaxing.    Use a technique, such as guided imagery, to help you relax. Imagine yourself in a beautiful, restful scene. Or daydream about the place you d most like to be.    7357-4573 [employer]. [address] [place] [address], [place] [address]. All rights reserved. This information is not intended as a substitute for professional medical care. Always follow your healthcare professional's instructions.            24 Hour Appointment Hotline       To make an appointment at any [facility], call [phone] ([phone]). If you don't have a family doctor or clinic, we will help you find one. [facility] are conveniently located to serve the needs of you and your family.             Review of your medicines      START taking        Dose / Directions Last dose taken    metoclopramide 5 MG tablet   Commonly known as:  REGLAN   Dose:  5 mg   Quantity:  20 tablet        Take 1 tablet (5 mg) by mouth 3 times daily as needed (Nausea or Vomiting)   Refills:  0          CONTINUE these medicines which may have CHANGED, or have new prescriptions. If we are uncertain of the size of tablets/capsules you have at home, strength may be listed as something that might have changed.        Dose / Directions Last dose taken    * albuterol (2.5 MG/3ML) 0.083% neb solution   Dose:  1 vial   What changed:  Another medication with the same name was removed. Continue taking this medication, and follow the directions you see here.   Quantity:  30 vial        Take 1 vial (2.5 mg) by nebulization every 6 hours as needed for shortness of breath / dyspnea or wheezing   Refills:  11        * albuterol 108 (90 BASE) MCG/ACT Inhaler   Commonly known as:  albuterol   Dose:  2 puff   What changed:  Another medication with the same name was removed. Continue taking this medication, and follow the directions you see here.    Quantity:  1 Inhaler        Inhale 2 puffs into the lungs every 4 hours as needed for shortness of breath / dyspnea   Refills:  0        levothyroxine 100 MCG tablet   Commonly known as:  SYNTHROID/LEVOTHROID   Dose:  100 mcg   What changed:  Another medication with the same name was removed. Continue taking this medication, and follow the directions you see here.   Quantity:  90 tablet        Take 1 tablet (100 mcg) by mouth daily   Refills:  3        * Notice:  This list has 2 medication(s) that are the same as other medications prescribed for you. Read the directions carefully, and ask your doctor or other care provider to review them with you.      Our records show that you are taking the medicines listed below. If these are incorrect, please call your family doctor or clinic.        Dose / Directions Last dose taken    Abdominal Binder/Elastic 3XL Misc   Dose:  1 Device   Quantity:  1 each        1 Device daily   Refills:  1        acetaminophen 325 MG tablet   Commonly known as:  TYLENOL   Dose:  650 mg   Quantity:  100 tablet        Take 2 tablets (650 mg) by mouth every 4 hours as needed for mild pain or fever (greater than or equal to 38? C /100.4? F (oral) or 38.5? C/ 101.4? F (core).)   Refills:  1        Alum & Mag Hydroxide-Simeth 200-200-20 MG Chew   Dose:  1-2 tablet   Quantity:  480 tablet        Take 1-2 tablets by mouth every 4 hours as needed   Refills:  0        * beclomethasone 40 MCG/ACT Inhaler   Commonly known as:  QVAR   Dose:  2 puff   Quantity:  1 Inhaler        Inhale 2 puffs into the lungs 2 times daily   Refills:  0        * beclomethasone 40 MCG/ACT Inhaler   Commonly known as:  QVAR   Dose:  2 puff   Quantity:  1 Inhaler        Inhale 2 puffs into the lungs 2 times daily   Refills:  1        docusate sodium 100 MG tablet   Commonly known as:  COLACE   Dose:  100 mg   Quantity:  60 tablet        Take 100 mg by mouth daily   Refills:  1        ibuprofen 400 MG tablet   Commonly known  as:  ADVIL/MOTRIN   Dose:  800 mg   Quantity:  120 tablet        Take 2 tablets (800 mg) by mouth every 6 hours as needed for other (cramping)   Refills:  0        omeprazole 40 MG capsule   Commonly known as:  priLOSEC   Dose:  40 mg   Quantity:  120 capsule        Take 1 capsule (40 mg) by mouth daily   Refills:  11        * order for DME   Quantity:  1 Device        Equipment being ordered: Nebulizer   Refills:  0        * order for DME   Quantity:  1 each        Equipment being ordered: abdominal binder   Refills:  0        polyethylene glycol powder   Commonly known as:  MIRALAX   Dose:  1 capful   Quantity:  510 g        Take 17 g (1 capful) by mouth daily   Refills:  11        prenatal multivitamin  plus iron 27-0.8 MG Tabs per tablet   Dose:  1 tablet   Quantity:  100 tablet        Take 1 tablet by mouth daily   Refills:  3        senna-docusate 8.6-50 MG per tablet   Commonly known as:  SENOKOT-S;PERICOLACE   Dose:  1-2 tablet   Quantity:  100 tablet        Take 1-2 tablets by mouth 2 times daily   Refills:  1        * Notice:  This list has 4 medication(s) that are the same as other medications prescribed for you. Read the directions carefully, and ask your doctor or other care provider to review them with you.      STOP taking        Dose Reason for stopping Comments    ondansetron 4 MG ODT tab   Commonly known as:  ZOFRAN ODT              ondansetron 4 MG tablet   Commonly known as:  ZOFRAN                      Prescriptions were sent or printed at these locations (1 Prescription)                   Other Prescriptions                Printed at Department/Unit printer (1 of 1)         metoclopramide (REGLAN) 5 MG tablet                Procedures and tests performed during your visit     CBC with platelets differential    CT Abdomen Pelvis w Contrast    Comprehensive metabolic panel    HCG qualitative    Lipase    US Abdomen Limited      Orders Needing Specimen Collection     None      Pending Results      No orders found from 4/18/2017 to 4/21/2017.            Pending Culture Results     No orders found from 4/18/2017 to 4/21/2017.            Test Results From Your Hospital Stay        4/20/2017  4:38 AM      Narrative     ULTRASOUND ABDOMEN LIMITED RIGHT UPPER QUADRANT  4/20/2017 4:07 AM     HISTORY: Right upper quadrant pain.    COMPARISON: 1/31/2013 - CT abdomen and pelvis.    FINDINGS: Mild diffuse increased hepatic echogenicity, likely  representing fatty infiltration. No hepatic masses. The gallbladder is  not visualized. Per report, there has been prior cholecystectomy. No  intra- or extrahepatic biliary dilatation. The common duct measures  0.6 cm in diameter. The right kidney has normal size and echogenicity  measuring 9.6 cm in length. No right intrarenal collecting system  dilatation, calculi or masses. No free fluid in the upper right  hemiabdomen.        Impression     IMPRESSION:   1. No biliary dilatation.  2. Prior cholecystectomy.  3. Mild diffuse fatty infiltration of the liver.    TOLU SMITH MD         4/20/2017  4:00 AM      Component Results     Component Value Ref Range & Units Status    WBC 8.1 4.0 - 11.0 10e9/L Final    RBC Count 4.73 3.8 - 5.2 10e12/L Final    Hemoglobin 12.3 11.7 - 15.7 g/dL Final    Hematocrit 38.8 35.0 - 47.0 % Final    MCV 82 78 - 100 fl Final    MCH 26.0 (L) 26.5 - 33.0 pg Final    MCHC 31.7 31.5 - 36.5 g/dL Final    RDW 16.0 (H) 10.0 - 15.0 % Final    Platelet Count 277 150 - 450 10e9/L Final    Diff Method Automated Method  Final    % Neutrophils 62.3 % Final    % Lymphocytes 18.2 % Final    % Monocytes 6.8 % Final    % Eosinophils 12.1 % Final    % Basophils 0.1 % Final    % Immature Granulocytes 0.5 % Final    Nucleated RBCs 0 0 /100 Final    Absolute Neutrophil 5.0 1.6 - 8.3 10e9/L Final    Absolute Lymphocytes 1.5 0.8 - 5.3 10e9/L Final    Absolute Monocytes 0.6 0.0 - 1.3 10e9/L Final    Absolute Eosinophils 1.0 (H) 0.0 - 0.7 10e9/L Final    Absolute Basophils  0.0 0.0 - 0.2 10e9/L Final    Abs Immature Granulocytes 0.0 0 - 0.4 10e9/L Final    Absolute Nucleated RBC 0.0  Final         4/20/2017  3:57 AM      Component Results     Component Value Ref Range & Units Status    Sodium 141 133 - 144 mmol/L Final    Potassium 3.5 3.4 - 5.3 mmol/L Final    Chloride 105 94 - 109 mmol/L Final    Carbon Dioxide 26 20 - 32 mmol/L Final    Anion Gap 10 3 - 14 mmol/L Final    Glucose 108 (H) 70 - 99 mg/dL Final    Urea Nitrogen 12 7 - 30 mg/dL Final    Creatinine 0.60 0.52 - 1.04 mg/dL Final    GFR Estimate >90  Non  GFR Calc   >60 mL/min/1.7m2 Final    GFR Estimate If Black >90   GFR Calc   >60 mL/min/1.7m2 Final    Calcium 8.5 8.5 - 10.1 mg/dL Final    Bilirubin Total 0.3 0.2 - 1.3 mg/dL Final    Albumin 3.2 (L) 3.4 - 5.0 g/dL Final    Protein Total 7.3 6.8 - 8.8 g/dL Final    Alkaline Phosphatase 126 40 - 150 U/L Final    ALT 41 0 - 50 U/L Final    AST 20 0 - 45 U/L Final         4/20/2017  3:57 AM      Component Results     Component Value Ref Range & Units Status    Lipase 129 73 - 393 U/L Final         4/20/2017  4:47 AM      Component Results     Component Value Ref Range & Units Status    HCG Qualitative Serum Negative NEG Final         4/20/2017  5:48 AM      Narrative     CT ABDOMEN AND PELVIS WITH CONTRAST  4/20/2017 4:52 AM     HISTORY: Recurrent abdominal pain, nausea and vomiting.    COMPARISON: 1/31/2013.    TECHNIQUE: Following the uneventful administration of 100 mL  Isovue-370 intravenous contrast, helical sections were acquired from  the top of the diaphragm through the pubic symphysis. Coronal  reconstructions were generated. Radiation dose for this scan was  reduced using automated exposure control, adjustment of the mA and/or  kV according to the patient's size, or iterative reconstruction  technique.    FINDINGS:  Abdomen: The liver is prominent in size. The spleen, pancreas, adrenal  glands and kidneys are unremarkable. The  gallbladder is not  visualized. No enlarged lymph nodes in the upper abdomen.    Scan through the lower chest is unremarkable.    Pelvis: The small and large bowel are normal in caliber. Mild haziness  is present within the jejunal mesentery. There is also a trace amount  of free fluid abutting the jejunum in the mid left hemiabdomen (series  2 image 27). No convincing bowel wall thickening, pneumatosis or free  intraperitoneal gas. The appendix is not visualized. The uterus is  present. A small amount of free fluid in the pelvis. No enlarged lymph  nodes in the pelvis.        Impression     IMPRESSION:  1. Mild haziness in the mesenteric fat of the jejunum and a trace  amount of fluid abutting the jejunum in the upper left hemiabdomen.  These findings are nonspecific, but could relate to enteritis.  2. A small amount of free fluid in the pelvis.  3. No other cause of acute pain identified in the abdomen or pelvis.  Note that the appendix is not visualized.    TOLU SMITH MD                Clinical Quality Measure: Blood Pressure Screening     Your blood pressure was checked while you were in the emergency department today. The last reading we obtained was  BP: 94/58 . Please read the guidelines below about what these numbers mean and what you should do about them.  If your systolic blood pressure (the top number) is less than 120 and your diastolic blood pressure (the bottom number) is less than 80, then your blood pressure is normal. There is nothing more that you need to do about it.  If your systolic blood pressure (the top number) is 120-139 or your diastolic blood pressure (the bottom number) is 80-89, your blood pressure may be higher than it should be. You should have your blood pressure rechecked within a year by a primary care provider.  If your systolic blood pressure (the top number) is 140 or greater or your diastolic blood pressure (the bottom number) is 90 or greater, you may have high blood  "pressure. High blood pressure is treatable, but if left untreated over time it can put you at risk for heart attack, stroke, or kidney failure. You should have your blood pressure rechecked by a primary care provider within the next 4 weeks.  If your provider in the emergency department today gave you specific instructions to follow-up with your doctor or provider even sooner than that, you should follow that instruction and not wait for up to 4 weeks for your follow-up visit.        Thank you for choosing [facility]       Thank you for choosing [facility] for your care. Our goal is always to provide you with excellent care. Hearing back from our patients is one way we can continue to improve our services. Please take a few minutes to complete the written survey that you may receive in the mail after you visit with us. Thank you!        [email or web address] Information     [employer] lets you send messages to your doctor, view your test results, renew your prescriptions, schedule appointments and more. To sign up, go to [email or web address] . Click on \"Log in\" on the left side of the screen, which will take you to the Welcome page. Then click on \"Sign up Now\" on the right side of the page.     You will be asked to enter the access code listed below, as well as some personal information. Please follow the directions to create your username and password.     Your access code is: [email or web address]  Expires: 6/10/2017  9:05 AM     Your access code will  in 90 days. If you need help or a new code, please call your [facility] clinic or [phone].        Care EveryWhere ID     This is your Care EveryWhere ID. This could be used by other organizations to access your [facility] medical records  [id number]        After Visit Summary       This is your record. Keep this with you and show to your community pharmacist(s) and doctor(s) at your next visit.                  "

## 2017-04-20 NOTE — ED PROVIDER NOTES
History     Chief Complaint:  Nausea and vomiting    The history is provided by the patient and a relative. The history is limited by a language barrier. No  was used (Pt and family declined ).      Gabi Roblero is a 37 year old female who presents with nausea and vomiting. On 4/16, the patient began experiencing abdominal pain, nausea, and vomiting, presented to the ED, underwent lab work and an abdominal x-ray which was negative. The patient was discharged with Zofran and her symptoms alleviated. Around 2000 last night, the patient began experiencing nausea, vomiting, and epigastric abdominal pain that radiates to her back- identical to the previous symptoms. No fevers. No urinary symptoms. No diarrhea. No new cough or cold symptoms.  The patient's last bowel movement was at 2230 which was difficult for her. Before that her last bowel movement was 2 days ago, but she states that she has chronic constipation.     Imaging (4/16):  Radiology findings were communicated with the patient who voiced understanding of the findings.     Abdomen x-ray, 2 views:  Nonobstructed bowel gas pattern  Reading per radiology     Laboratory (4/16):  Laboratory findings were communicated with the patient who voiced understanding of the findings.     CBC: WNL. (WBC 8.1, HGB 13.5, )   CMP: Calcium: 8.1 (L), Albumin: 3.0 (L) (Creatinine 0.53)  Lipase: 92  UA: WBC/HPF: 3 (H), Squamous Cells: 5 (H), Mucous Present (A)  HCG Qualitative Urine: Negative    Allergies:  No known drug allergies.     Medications:    Zofran  Qvar inhaler  Albuterol inhaler  Levothyroxine  Alum & mag hydroxide-simeth  Colace   Albuterol neb  Omeprazole  Ibuprofen  Senna-docusate  Tylenol  Miralax    Past Medical History:    GERD  Asthma  Thyroid disease  Hyperlipidemia   Hypothyroidism     Past Surgical History:    Cholecystectomy    Family History:    Diabetes - father    Social History:  Marital Status:    Presents to the ED with a friend  Tobacco Use: negative  Alcohol Use: negative  PCP: Dhruv Andino     Review of Systems   Constitutional: Negative for fever.   Respiratory: Negative for cough.    Gastrointestinal: Positive for abdominal pain, constipation, nausea and vomiting. Negative for diarrhea.   All other systems reviewed and are negative.    Physical Exam   First Vitals:  BP: (!) 149/97  Pulse: 69  Temp: 99.8  F (37.7  C)  Resp: 18  SpO2: 97 %    Physical Exam  VITAL SIGNS: BP 94/58  Pulse 69  Temp 99.8  F (37.7  C) (Temporal)  Resp 18  LMP 04/09/2017 (Approximate)  SpO2 98%   Constitutional:  Well developed, Well nourished, completely comfortable appearing   HENT:  Bilateral external ears normal, Nose normal. Neck- Normal range of motion, Supple dry mucous membranes  Respiratory:  Normal breath sounds, No respiratory distress, No wheezing,  Cardiovascular:  Normal heart rate, Normal rhythm, No murmurs,    GI:  Bowel sounds normal, Soft, Nondistended, no significant pain with deep palpation of the abdomen, no rebound or guarding  Musculoskeletal:  Intact distal pulses, No edema, grossly unremarkable range of motion   Integument:  Warm, Dry   Neurologic:  Alert, attentive and appropriately oriented  Psychiatric:  Mildly anxious.    Emergency Department Course   Imaging:  Radiographic findings were communicated with the patient who voiced understanding of the findings.    US Abdomen Limited  1. No biliary dilatation.  2. Prior cholecystectomy.  3. Mild diffuse fatty infiltration of the liver.  Report per radiology.    CT Abdomen Pelvis w Contrast  1. Mild haziness in the mesenteric fat of the jejunum and a trace  amount of fluid abutting the jejunum in the upper left hemiabdomen.  These findings are nonspecific but could relate to enteritis.  2. A small amount of free fluid in the pelvis.  3. No other cause of acute pain identified in the abdomen or pelvis.  Note that the appendix is  not visualized.  Preliminary radiology read.      Laboratory:  CBC:  WBC 8.1, HGB 12.3,   CMP: glucose 108 (H), albumin 3.2 (L), otherwise WNL (Creatinine 0.60)  Lipase: 129  HCG qualitative: negative    Interventions:  (0319) Normal Saline, 1 liter, IV bolus  (0318) Dilaudid, 0.5 mg, IV injection  (0318) Reglan, 5 mg, IV    Emergency Department Course:  Nursing notes and vitals reviewed.  I performed an exam of the patient as documented above.   A peripheral IV was established. Blood was drawn from the patient. This was sent for laboratory testing, findings above.   The patient was sent for a abdomen ultrasound and abdomen pelvis CT while in the emergency department, findings above.   (8007) I rechecked the patient and discussed her results. She is feeling better and has had no episodes of vomiting here.   Findings and plan explained to the patient and her sister at length. Patient discharged home with instructions regarding supportive care, medications, and reasons to return. The importance of close follow-up was reviewed. The patient was prescribed Reglan.  I personally reviewed the laboratory results with the patient and answered all related questions prior to discharge.     Impression & Plan    Medical Decision Making:  Gabi Roblero is a 37 year old female who presents with acute nausea and vomiting. She has a benign abdominal exam without focal RLQ or LLQ tenderness to suggest diverticulitis or appendicitis, respectively, or other acute abdominal process. Labs were again unremarkable. With no urinary symptoms UA was not repeated. US for biliary colic was done, and was negative. Given her recurrent symptoms I did order CT abd/pelvis, which showed nonsp enteritis. I did discuss the sometimes vague initial constellation of symptoms early in the course of acute abdominal processes, and the need for immediate return should pain or other concerning symptoms develop.  Symptoms are improved after IV  fluids and symptomatic treatment.  The patient is not pregnant. Vital signs have remained normal and stable throughout the ED course, and the abdominal re-exam remains benign. I believe she is safe for discharge in improved condition at this time with strict return precautions for recurrent vomiting, pain, fever or any other concerning symptoms.    Diagnosis:    ICD-10-CM    1. Non-intractable vomiting with nausea, unspecified vomiting type R11.2        Disposition:  Discharge to home.    Discharge Medications:  New Prescriptions    METOCLOPRAMIDE (REGLAN) 5 MG TABLET    Take 1 tablet (5 mg) by mouth 3 times daily as needed (Nausea or Vomiting)       Farhan BOWLES, am serving as a scribe on 4/20/2017 at 2:39 AM to personally document services performed by Dr. Thomas based on my observations and the provider's statements to me.       Kelsey Thomas MD  04/20/17 0663

## 2017-04-21 ENCOUNTER — OFFICE VISIT (OUTPATIENT)
Dept: INTERNAL MEDICINE | Facility: CLINIC | Age: 37
End: 2017-04-21
Payer: COMMERCIAL

## 2017-04-21 VITALS
TEMPERATURE: 97.9 F | HEART RATE: 107 BPM | DIASTOLIC BLOOD PRESSURE: 72 MMHG | OXYGEN SATURATION: 95 % | BODY MASS INDEX: 47.09 KG/M2 | HEIGHT: 66 IN | SYSTOLIC BLOOD PRESSURE: 114 MMHG | WEIGHT: 293 LBS

## 2017-04-21 DIAGNOSIS — K21.9 GASTROESOPHAGEAL REFLUX DISEASE WITHOUT ESOPHAGITIS: ICD-10-CM

## 2017-04-21 DIAGNOSIS — R11.2 NAUSEA AND VOMITING, INTRACTABILITY OF VOMITING NOT SPECIFIED, UNSPECIFIED VOMITING TYPE: Primary | ICD-10-CM

## 2017-04-21 DIAGNOSIS — R22.0 FACIAL SWELLING: ICD-10-CM

## 2017-04-21 DIAGNOSIS — R10.13 ABDOMINAL PAIN, EPIGASTRIC: ICD-10-CM

## 2017-04-21 PROCEDURE — 99214 OFFICE O/P EST MOD 30 MIN: CPT | Performed by: NURSE PRACTITIONER

## 2017-04-21 RX ORDER — SUCRALFATE ORAL 1 G/10ML
1 SUSPENSION ORAL 4 TIMES DAILY
Qty: 420 ML | Refills: 1 | Status: SHIPPED | OUTPATIENT
Start: 2017-04-21 | End: 2018-05-01

## 2017-04-21 NOTE — PATIENT INSTRUCTIONS
Labs in [address]    Increase fluids intake     Carafate before meal and bedtime     Continue omeprazole daily      If taking Reglan for nausea take after breast feeding and have 3-4 hours after before breast feeding again, or bottle feed

## 2017-04-21 NOTE — MR AVS SNAPSHOT
"              After Visit Summary   4/21/2017    Gabi Roblero    MRN: [id number]           Patient Information     Date Of Birth          1980        Visit Information        Provider Department      4/21/2017 2:30 PM Jamilah Anguiano APRN CNP; [employer] [facility]        Today's Diagnoses     Nausea and vomiting, intractability of vomiting not specified, unspecified vomiting type    -  1      Care Instructions    Labs in [address]    Increase fluids intake     Carafate before meal and bedtime     Continue omeprazole daily      If taking Reglan for nausea take after breast feeding and have 3-4 hours after before breast feeding again, or bottle feed             Follow-ups after your visit        Future tests that were ordered for you today     Open Future Orders        Priority Expected Expires Ordered    H Pylori antigen stool Routine  5/21/2017 4/21/2017            Who to contact     If you have questions or need follow up information about today's clinic visit or your schedule please contact [facility] directly at [phone].  Normal or non-critical lab and imaging results will be communicated to you by [employer], letter or phone within 4 business days after the clinic has received the results. If you do not hear from us within 7 days, please contact the clinic through [employer] or phone. If you have a critical or abnormal lab result, we will notify you by phone as soon as possible.  Submit refill requests through [employer] or call your pharmacy and they will forward the refill request to us. Please allow 3 business days for your refill to be completed.          Additional Information About Your Visit        [employer] Information     [employer] lets you send messages to your doctor, view your test results, renew your prescriptions, schedule appointments and more. To sign up, go to [email or web address] . Click on \"Log in\" on the left side of the " "screen, which will take you to the Welcome page. Then click on \"Sign up Now\" on the right side of the page.     You will be asked to enter the access code listed below, as well as some personal information. Please follow the directions to create your username and password.     Your access code is: [email or web address]  Expires: 6/10/2017  9:05 AM     Your access code will  in 90 days. If you need help or a new code, please call your [facility] or [phone].        Care EveryWhere ID     This is your Care EveryWhere ID. This could be used by other organizations to access your [place] medical records  [id number]        Your Vitals Were     Pulse Temperature Height Last Period Pulse Oximetry BMI (Body Mass Index)    107 97.9  F (36.6  C) (Oral) 5' 6\" (1.676 m) 2017 (Approximate) 95% 48.6 kg/m2       Blood Pressure from Last 3 Encounters:   17 114/72   17 114/73   17 118/78    Weight from Last 3 Encounters:   17 (!) 301 lb 1.6 oz (136.6 kg)   17 297 lb 9.9 oz (135 kg)   17 298 lb 8 oz (135.4 kg)               Primary Care Provider Office Phone # Fax #    Krishnakumari G MD Sina [phone]       [facility] [address] [place] [address]        Thank you!     Thank you for choosing [facility]  for your care. Our goal is always to provide you with excellent care. Hearing back from our patients is one way we can continue to improve our services. Please take a few minutes to complete the written survey that you may receive in the mail after your visit with us. Thank you!             Your Updated Medication List - Protect others around you: Learn how to safely use, store and throw away your medicines at www.disposemymeds.org.          This list is accurate as of: 17  3:52 PM.  Always use your most recent med list.                   Brand Name Dispense Instructions for use    Abdominal Binder/Elastic 3XL Misc     1 " each    1 Device daily       acetaminophen 325 MG tablet    TYLENOL    100 tablet    Take 2 tablets (650 mg) by mouth every 4 hours as needed for mild pain or fever (greater than or equal to 38? C /100.4? F (oral) or 38.5? C/ 101.4? F (core).)       * albuterol (2.5 MG/3ML) 0.083% neb solution     30 vial    Take 1 vial (2.5 mg) by nebulization every 6 hours as needed for shortness of breath / dyspnea or wheezing       * albuterol 108 (90 BASE) MCG/ACT Inhaler    albuterol    1 Inhaler    Inhale 2 puffs into the lungs every 4 hours as needed for shortness of breath / dyspnea       Alum & Mag Hydroxide-Simeth 200-200-20 MG Chew     480 tablet    Take 1-2 tablets by mouth every 4 hours as needed       * beclomethasone 40 MCG/ACT Inhaler    QVAR    1 Inhaler    Inhale 2 puffs into the lungs 2 times daily       * beclomethasone 40 MCG/ACT Inhaler    QVAR    1 Inhaler    Inhale 2 puffs into the lungs 2 times daily       docusate sodium 100 MG tablet    COLACE    60 tablet    Take 100 mg by mouth daily       ibuprofen 400 MG tablet    ADVIL/MOTRIN    120 tablet    Take 2 tablets (800 mg) by mouth every 6 hours as needed for other (cramping)       levothyroxine 100 MCG tablet    SYNTHROID/LEVOTHROID    90 tablet    Take 1 tablet (100 mcg) by mouth daily       metoclopramide 5 MG tablet    REGLAN    20 tablet    Take 1 tablet (5 mg) by mouth 3 times daily as needed (Nausea or Vomiting)       omeprazole 40 MG capsule    priLOSEC    120 capsule    Take 1 capsule (40 mg) by mouth daily       * order for DME     1 Device    Equipment being ordered: Nebulizer       * order for DME     1 each    Equipment being ordered: abdominal binder       polyethylene glycol powder    MIRALAX    510 g    Take 17 g (1 capful) by mouth daily       prenatal multivitamin  plus iron 27-0.8 MG Tabs per tablet     100 tablet    Take 1 tablet by mouth daily       senna-docusate 8.6-50 MG per tablet    SENOKOT-S;PERICOLACE    100 tablet    Take 1-2  tablets by mouth 2 times daily       * Notice:  This list has 6 medication(s) that are the same as other medications prescribed for you. Read the directions carefully, and ask your doctor or other care provider to review them with you.

## 2017-04-21 NOTE — NURSING NOTE
"Chief Complaint   Patient presents with     ER F/U     4/20/17       Initial /72 (BP Location: Left arm, Patient Position: Chair, Cuff Size: Adult Large)  Pulse 107  Temp 97.9  F (36.6  C) (Oral)  Ht 5' 6\" (1.676 m)  Wt (!) 301 lb 1.6 oz (136.6 kg)  LMP 04/09/2017 (Approximate)  SpO2 95%  BMI 48.6 kg/m2 Estimated body mass index is 48.6 kg/(m^2) as calculated from the following:    Height as of this encounter: 5' 6\" (1.676 m).    Weight as of this encounter: 301 lb 1.6 oz (136.6 kg).  Medication Reconciliation: complete    "

## 2017-04-21 NOTE — PROGRESS NOTES
SUBJECTIVE:                                                    Gabi Roblero is a 37 year old female who presents to clinic today for the following health issues:      ED/UC Followup:    Facility:  [facility]  Date of visit: 17  Reason for visit: nausea and vomiting with abdominal pain  Current Status: still having pain and not eating      Eating makes her throw up   Only thing she can eat is slice of bread   Drinking ginger soda      EGD - negative   Told to take reflux medication   Omeprazole 40 mg daily     Not taking Reglan   Baby 4 months old                Problem list and histories reviewed & adjusted, as indicated.  Additional history:     Patient Active Problem List   Diagnosis     Gastritis     CARDIOVASCULAR SCREENING; LDL GOAL LESS THAN 160     Vitamin D deficiency     Mild intermittent asthma     GERD (gastroesophageal reflux disease)     Rotator cuff syndrome     Moderate persistent asthma     Supervision of elderly multigravida     AMA (advanced maternal age) multigravida 35+     Other specified hypothyroidism     Normal  (single liveborn)     Indication for care in labor or delivery     Labor and delivery indication for care or intervention     Past Surgical History:   Procedure Laterality Date     CHOLECYSTECTOMY         Social History   Substance Use Topics     Smoking status: Never Smoker     Smokeless tobacco: Never Used     Alcohol use No     Family History   Problem Relation Age of Onset     DIABETES Father      Family History Negative Mother      CANCER No family hx of      CEREBROVASCULAR DISEASE No family hx of      Hypertension No family hx of      C.A.D. No family hx of      Asthma No family hx of            Reviewed and updated as needed this visit by clinical staff  Tobacco  Allergies  Meds  Med Hx  Surg Hx  Fam Hx  Soc Hx      Reviewed and updated as needed this visit by Provider         ROS:  Constitutional, HEENT, cardiovascular, pulmonary, GI, ,  "musculoskeletal, neuro, skin, endocrine and psych systems are negative, except as otherwise noted.    OBJECTIVE:                                                    /72 (BP Location: Left arm, Patient Position: Chair, Cuff Size: Adult Large)  Pulse 107  Temp 97.9  F (36.6  C) (Oral)  Ht 5' 6\" (1.676 m)  Wt (!) 301 lb 1.6 oz (136.6 kg)  LMP 04/09/2017 (Approximate)  SpO2 95%  BMI 48.6 kg/m2  Body mass index is 48.6 kg/(m^2).  GENERAL: alert and no distress; here with   RESP: lungs clear to auscultation - no rales, rhonchi or wheezes  CV: regular rate and rhythm  ABDOMEN: soft, epigastric tender, no hepatosplenomegaly, no masses and bowel sounds normal  MS: no gross musculoskeletal defects noted, no edema  NEURO: Normal strength and tone, mentation intact and speech normal  PSYCH: mentation appears normal, affect normal/bright    Diagnostic Test Results:  Reviewed ER notes      ASSESSMENT/PLAN:                                                            1. Nausea and vomiting, intractability of vomiting not specified, unspecified vomiting type  Has not been using reglan - discussed use with breastfeeding   - H Pylori antigen stool; Future    2. Gastroesophageal reflux disease without esophagitis    - sucralfate (CARAFATE) 1 GM/10ML suspension; Take 10 mLs (1 g) by mouth 4 times daily  Dispense: 420 mL; Refill: 1    3. Abdominal pain, epigastric    - sucralfate (CARAFATE) 1 GM/10ML suspension; Take 10 mLs (1 g) by mouth 4 times daily  Dispense: 420 mL; Refill: 1    4. Facial swelling  This was noted and showed a picture on phone of facial swelling that happens off and on ; wants referral to allergist ; of note face is absent swelling today   - ALLERGY/ASTHMA ADULT REFERRAL    Patient Instructions   Labs in [address]    Increase fluids intake     Carafate before meal and bedtime     Continue omeprazole daily      If taking Reglan for nausea take after breast feeding and have 3-4 hours after before " breast feeding again, or bottle feed           CARLOS Brown CNP  [facility]

## 2017-04-24 DIAGNOSIS — R11.2 NAUSEA AND VOMITING, INTRACTABILITY OF VOMITING NOT SPECIFIED, UNSPECIFIED VOMITING TYPE: ICD-10-CM

## 2017-04-24 PROCEDURE — 87338 HPYLORI STOOL AG IA: CPT | Performed by: NURSE PRACTITIONER

## 2017-04-25 LAB
H PYLORI AG STL QL IA: NORMAL
MICRO REPORT STATUS: NORMAL
SPECIMEN SOURCE: NORMAL

## 2017-04-26 ENCOUNTER — TELEPHONE (OUTPATIENT)
Dept: INTERNAL MEDICINE | Facility: CLINIC | Age: 37
End: 2017-04-26

## 2017-04-26 NOTE — TELEPHONE ENCOUNTER
Pt called with  results. Relayed below.     Notes Recorded by Jamilah Anguiano APRN CNP on 4/25/2017 at 12:31 PM  Negative H pylori

## 2017-05-12 NOTE — TELEPHONE ENCOUNTER
Through [place]  [employer] [phone] #[id number] FELIBERTOOVM for pt to call back.  Note that she has recently seen Jamilah Anguiano and could f/u with her.  Es Stein CMA

## 2017-07-12 ENCOUNTER — OFFICE VISIT (OUTPATIENT)
Dept: INTERNAL MEDICINE | Facility: CLINIC | Age: 37
End: 2017-07-12
Payer: COMMERCIAL

## 2017-07-12 VITALS
OXYGEN SATURATION: 94 % | DIASTOLIC BLOOD PRESSURE: 80 MMHG | WEIGHT: 293 LBS | HEIGHT: 66 IN | TEMPERATURE: 98.2 F | RESPIRATION RATE: 12 BRPM | SYSTOLIC BLOOD PRESSURE: 110 MMHG | BODY MASS INDEX: 47.09 KG/M2 | HEART RATE: 68 BPM

## 2017-07-12 DIAGNOSIS — E55.9 VITAMIN D DEFICIENCY: ICD-10-CM

## 2017-07-12 DIAGNOSIS — E03.8 OTHER SPECIFIED HYPOTHYROIDISM: ICD-10-CM

## 2017-07-12 DIAGNOSIS — R53.83 OTHER FATIGUE: Primary | ICD-10-CM

## 2017-07-12 DIAGNOSIS — R10.2 PELVIC PAIN: ICD-10-CM

## 2017-07-12 DIAGNOSIS — O09.522 SUPERVISION OF ELDERLY MULTIGRAVIDA, SECOND TRIMESTER: ICD-10-CM

## 2017-07-12 DIAGNOSIS — N92.6 IRREGULAR PERIODS: ICD-10-CM

## 2017-07-12 LAB
BASOPHILS # BLD AUTO: 0 10E9/L (ref 0–0.2)
BASOPHILS NFR BLD AUTO: 0.2 %
DIFFERENTIAL METHOD BLD: ABNORMAL
EOSINOPHIL # BLD AUTO: 0.3 10E9/L (ref 0–0.7)
EOSINOPHIL NFR BLD AUTO: 5.1 %
ERYTHROCYTE [DISTWIDTH] IN BLOOD BY AUTOMATED COUNT: 15.5 % (ref 10–15)
HCT VFR BLD AUTO: 39.8 % (ref 35–47)
HGB BLD-MCNC: 12.8 G/DL (ref 11.7–15.7)
LYMPHOCYTES # BLD AUTO: 1.8 10E9/L (ref 0.8–5.3)
LYMPHOCYTES NFR BLD AUTO: 28.8 %
MCH RBC QN AUTO: 25.5 PG (ref 26.5–33)
MCHC RBC AUTO-ENTMCNC: 32.2 G/DL (ref 31.5–36.5)
MCV RBC AUTO: 79 FL (ref 78–100)
MONOCYTES # BLD AUTO: 0.6 10E9/L (ref 0–1.3)
MONOCYTES NFR BLD AUTO: 8.7 %
NEUTROPHILS # BLD AUTO: 3.6 10E9/L (ref 1.6–8.3)
NEUTROPHILS NFR BLD AUTO: 57.2 %
PLATELET # BLD AUTO: 289 10E9/L (ref 150–450)
RBC # BLD AUTO: 5.01 10E12/L (ref 3.8–5.2)
WBC # BLD AUTO: 6.3 10E9/L (ref 4–11)

## 2017-07-12 PROCEDURE — 83540 ASSAY OF IRON: CPT | Performed by: NURSE PRACTITIONER

## 2017-07-12 PROCEDURE — 99215 OFFICE O/P EST HI 40 MIN: CPT | Performed by: NURSE PRACTITIONER

## 2017-07-12 PROCEDURE — 83550 IRON BINDING TEST: CPT | Performed by: NURSE PRACTITIONER

## 2017-07-12 PROCEDURE — 82306 VITAMIN D 25 HYDROXY: CPT | Performed by: NURSE PRACTITIONER

## 2017-07-12 PROCEDURE — 80050 GENERAL HEALTH PANEL: CPT | Performed by: NURSE PRACTITIONER

## 2017-07-12 PROCEDURE — 84439 ASSAY OF FREE THYROXINE: CPT | Performed by: NURSE PRACTITIONER

## 2017-07-12 PROCEDURE — 36415 COLL VENOUS BLD VENIPUNCTURE: CPT | Performed by: NURSE PRACTITIONER

## 2017-07-12 PROCEDURE — 82728 ASSAY OF FERRITIN: CPT | Performed by: NURSE PRACTITIONER

## 2017-07-12 RX ORDER — PRENATAL VIT/IRON FUM/FOLIC AC 27MG-0.8MG
1 TABLET ORAL DAILY
Qty: 100 TABLET | Refills: 3 | Status: SHIPPED | OUTPATIENT
Start: 2017-07-12 | End: 2018-05-01

## 2017-07-12 NOTE — PROGRESS NOTES
SUBJECTIVE:                                                    Gabi Roblero is a 37 year old female who presents to clinic today for the following health issues:    Here for complaints   Since had baby - irregular period   Has seen OB GYN post birth of baby- not since   Told her everything would go back to normal but has not   Fatigued  - labs today   Baby 6 months old     Period - comes every other week and does not bleed good - small amount of blood   Irregular in weeks- sometime in 2 weeks and sometimes     Low abd pain- in area of uterus - will do pelvic ultrasound   Left hip pain probably related to holding baby to feed   Discussed positioning           Problem list and histories reviewed & adjusted, as indicated.  Additional history: as documented    Patient Active Problem List   Diagnosis     Gastritis     CARDIOVASCULAR SCREENING; LDL GOAL LESS THAN 160     Vitamin D deficiency     Mild intermittent asthma     GERD (gastroesophageal reflux disease)     Rotator cuff syndrome     Moderate persistent asthma     Supervision of elderly multigravida     AMA (advanced maternal age) multigravida 35+     Other specified hypothyroidism     Normal  (single liveborn)     Indication for care in labor or delivery     Labor and delivery indication for care or intervention     Past Surgical History:   Procedure Laterality Date     CHOLECYSTECTOMY         Social History   Substance Use Topics     Smoking status: Never Smoker     Smokeless tobacco: Never Used     Alcohol use No     Family History   Problem Relation Age of Onset     DIABETES Father      Family History Negative Mother      CANCER No family hx of      CEREBROVASCULAR DISEASE No family hx of      Hypertension No family hx of      C.A.D. No family hx of      Asthma No family hx of            Reviewed and updated as needed this visit by clinical staff  Tobacco  Allergies  Meds  Soc Hx      Reviewed and updated as needed this visit by  "Provider         ROS:  Constitutional, HEENT, cardiovascular, pulmonary, GI, , musculoskeletal, neuro, skin, endocrine and psych systems are negative, except as otherwise noted.    OBJECTIVE:     /80 (BP Location: Left arm, Patient Position: Chair, Cuff Size: Adult Large)  Pulse 68  Temp 98.2  F (36.8  C) (Oral)  Resp 12  Ht 5' 6\" (1.676 m)  Wt (!) 303 lb (137.4 kg)  SpO2 94%  BMI 48.91 kg/m2  Body mass index is 48.91 kg/(m^2).  GENERAL: alert and no distress; here with baby and    RESP: lungs clear to auscultation - no rales, rhonchi or wheezes  CV: regular rate and rhythm  ABDOMEN: soft, nontender,  and bowel sounds normal  MS: no gross musculoskeletal defects noted, no edema  NEURO: Normal strength and tone, mentation intact and speech normal  PSYCH: mentation appears normal, affect normal/bright    Diagnostic Test Results:  Labs     ASSESSMENT/PLAN:             1. Other fatigue    - TSH  - T4 free  - CBC with platelets differential  - Iron and iron binding capacity  - Ferritin  - Vitamin D Deficiency  - Comprehensive metabolic panel    2. Other specified hypothyroidism    - TSH  - T4 free    3. Vitamin D deficiency    - Vitamin D Deficiency    4. Supervision of elderly multigravida, second trimester  - Prenatal Vit-Fe Fumarate-FA (PRENATAL MULTIVITAMIN  PLUS IRON) 27-0.8 MG TABS per tablet; Take 1 tablet by mouth daily  Dispense: 100 tablet; Refill: 3    5. Irregular periods    - CBC with platelets differential  - Iron and iron binding capacity  - Ferritin  - Prenatal Vit-Fe Fumarate-FA (PRENATAL MULTIVITAMIN  PLUS IRON) 27-0.8 MG TABS per tablet; Take 1 tablet by mouth daily  Dispense: 100 tablet; Refill: 3  - US Pelvic Complete w Transvaginal; Future    6. Pelvic pain    - US Pelvic Complete w Transvaginal; Future    Patient Instructions   Lab in [address]    Pelvic ultrasound   They will call you to set up     Positions to support back discussed     May use ibuprofen for pain if " needed in hip     Ice or heat for comfort       CARLOS Brown [facility]

## 2017-07-12 NOTE — NURSING NOTE
"Chief Complaint   Patient presents with     RECHECK       Initial /80 (BP Location: Left arm, Patient Position: Chair, Cuff Size: Adult Large)  Pulse 68  Temp 98.2  F (36.8  C) (Oral)  Resp 12  Ht 5' 6\" (1.676 m)  Wt (!) 303 lb (137.4 kg)  SpO2 94%  BMI 48.91 kg/m2 Estimated body mass index is 48.91 kg/(m^2) as calculated from the following:    Height as of this encounter: 5' 6\" (1.676 m).    Weight as of this encounter: 303 lb (137.4 kg).  Medication Reconciliation: complete     STEPHANIE Carter      "

## 2017-07-12 NOTE — MR AVS SNAPSHOT
After Visit Summary   7/12/2017    Gbai Roblero    MRN: [id number]           Patient Information     Date Of Birth          1980        Visit Information        Provider Department      7/12/2017 1:15 PM Open, Assignments; Jamilah Anguiano APRN CNP [facility]        Today's Diagnoses     Other fatigue    -  1    Other specified hypothyroidism        Vitamin D deficiency        Supervision of elderly multigravida, second trimester        Irregular periods        Pelvic pain          Care Instructions    Lab in [address]    Pelvic ultrasound   They will call you to set up     Positions to support back discussed     May use ibuprofen for pain if needed in hip     Ice or heat for comfort           Follow-ups after your visit        Future tests that were ordered for you today     Open Future Orders        Priority Expected Expires Ordered    US Pelvic Complete w Transvaginal Routine  7/12/2018 7/12/2017            Who to contact     If you have questions or need follow up information about today's clinic visit or your schedule please contact [facility] directly at [phone].  Normal or non-critical lab and imaging results will be communicated to you by Carbayhart, letter or phone within 4 business days after the clinic has received the results. If you do not hear from us within 7 days, please contact the clinic through Carbayhart or phone. If you have a critical or abnormal lab result, we will notify you by phone as soon as possible.  Submit refill requests through Spotwise or call your pharmacy and they will forward the refill request to us. Please allow 3 business days for your refill to be completed.          Additional Information About Your Visit        Carbayhart Information     Spotwise lets you send messages to your doctor, view your test results, renew your prescriptions, schedule appointments and more. To sign up, go to [email or web address] . Click on  "\"Log in\" on the left side of the screen, which will take you to the Welcome page. Then click on \"Sign up Now\" on the right side of the page.     You will be asked to enter the access code listed below, as well as some personal information. Please follow the directions to create your username and password.     Your access code is: [email or web address]  Expires: 10/10/2017  1:57 PM     Your access code will  in 90 days. If you need help or a new code, please call your [facility] clinic or [phone].        Care EveryWhere ID     This is your Care EveryWhere ID. This could be used by other organizations to access your [facility] medical records  [id number]        Your Vitals Were     Pulse Temperature Respirations Height Pulse Oximetry BMI (Body Mass Index)    68 98.2  F (36.8  C) (Oral) 12 5' 6\" (1.676 m) 94% 48.91 kg/m2       Blood Pressure from Last 3 Encounters:   17 110/80   17 114/72   17 114/73    Weight from Last 3 Encounters:   17 (!) 303 lb (137.4 kg)   17 (!) 301 lb 1.6 oz (136.6 kg)   17 297 lb 9.9 oz (135 kg)              We Performed the Following     CBC with platelets differential     Comprehensive metabolic panel     Ferritin     Iron and iron binding capacity     T4 free     TSH     Vitamin D Deficiency          Where to get your medicines      These medications were sent to [facility] [place], [place] - [address]  [address] [facility] [address]     Phone:  [phone]     prenatal multivitamin  plus iron 27-0.8 MG Tabs per tablet          Primary Care Provider Office Phone # Fax #    Dhruv Andino [phone]       [facility] [address] [place] [address]        Equal Access to Services     JING BELLE AH: Abraham Garner, kerwin ryan, hayder andrewsh la'aan ah. [facility] [phone].    ATENCIÓN: Si kalyn radford, " tiene a sims disposición servicios gratuitos de asistencia lingüística. Zack zaman [phone].    We comply with applicable federal civil rights laws and [place] laws. We do not discriminate on the basis of race, color, national origin, age, disability sex, sexual orientation or gender identity.            Thank you!     Thank you for choosing [facility]  for your care. Our goal is always to provide you with excellent care. Hearing back from our patients is one way we can continue to improve our services. Please take a few minutes to complete the written survey that you may receive in the mail after your visit with us. Thank you!             Your Updated Medication List - Protect others around you: Learn how to safely use, store and throw away your medicines at www.disposemymeds.org.          This list is accurate as of: 17  1:58 PM.  Always use your most recent med list.                   Brand Name Dispense Instructions for use Diagnosis    Abdominal Binder/Elastic 3XL Misc     1 each    1 Device daily    Prenatal care in third trimester       acetaminophen 325 MG tablet    TYLENOL    100 tablet    Take 2 tablets (650 mg) by mouth every 4 hours as needed for mild pain or fever (greater than or equal to 38? C /100.4? F (oral) or 38.5? C/ 101.4? F (core).)     (spontaneous vaginal delivery)       * albuterol (2.5 MG/3ML) 0.083% neb solution     30 vial    Take 1 vial (2.5 mg) by nebulization every 6 hours as needed for shortness of breath / dyspnea or wheezing    Moderate persistent asthma without complication       * albuterol 108 (90 BASE) MCG/ACT Inhaler    albuterol    1 Inhaler    Inhale 2 puffs into the lungs every 4 hours as needed for shortness of breath / dyspnea        Alum & Mag Hydroxide-Simeth 200-200-20 MG Chew     480 tablet    Take 1-2 tablets by mouth every 4 hours as needed    Flatulence, eructation and gas pain       * beclomethasone 40 MCG/ACT Inhaler    QVAR    1 Inhaler     Inhale 2 puffs into the lungs 2 times daily        * beclomethasone 40 MCG/ACT Inhaler    QVAR    1 Inhaler    Inhale 2 puffs into the lungs 2 times daily    Moderate persistent asthma without complication       docusate sodium 100 MG tablet    COLACE    60 tablet    Take 100 mg by mouth daily    Constipation, unspecified constipation type       ibuprofen 400 MG tablet    ADVIL/MOTRIN    120 tablet    Take 2 tablets (800 mg) by mouth every 6 hours as needed for other (cramping)     (spontaneous vaginal delivery)       levothyroxine 100 MCG tablet    SYNTHROID/LEVOTHROID    90 tablet    Take 1 tablet (100 mcg) by mouth daily    Hypothyroidism, unspecified type       metoclopramide 5 MG tablet    REGLAN    20 tablet    Take 1 tablet (5 mg) by mouth 3 times daily as needed (Nausea or Vomiting)        omeprazole 40 MG capsule    priLOSEC    120 capsule    Take 1 capsule (40 mg) by mouth daily    Gastroesophageal reflux disease without esophagitis       * order for DME     1 Device    Equipment being ordered: Nebulizer    Moderate persistent asthma without complication       * order for DME     1 each    Equipment being ordered: abdominal binder    Routine postpartum follow-up       polyethylene glycol powder    MIRALAX    510 g    Take 17 g (1 capful) by mouth daily    Constipation, unspecified constipation type       prenatal multivitamin  plus iron 27-0.8 MG Tabs per tablet     100 tablet    Take 1 tablet by mouth daily    Supervision of elderly multigravida, second trimester, Irregular periods       senna-docusate 8.6-50 MG per tablet    SENOKOT-S;PERICOLACE    100 tablet    Take 1-2 tablets by mouth 2 times daily     (spontaneous vaginal delivery)       sucralfate 1 GM/10ML suspension    CARAFATE    420 mL    Take 10 mLs (1 g) by mouth 4 times daily    Gastroesophageal reflux disease without esophagitis, Abdominal pain, epigastric       * Notice:  This list has 6 medication(s) that are the same as other  medications prescribed for you. Read the directions carefully, and ask your doctor or other care provider to review them with you.

## 2017-07-12 NOTE — PATIENT INSTRUCTIONS
Lab in [address]    Pelvic ultrasound   They will call you to set up     Positions to support back discussed     May use ibuprofen for pain if needed in hip     Ice or heat for comfort

## 2017-07-13 LAB
ALBUMIN SERPL-MCNC: 3.5 G/DL (ref 3.4–5)
ALP SERPL-CCNC: 100 U/L (ref 40–150)
ALT SERPL W P-5'-P-CCNC: 17 U/L (ref 0–50)
ANION GAP SERPL CALCULATED.3IONS-SCNC: 11 MMOL/L (ref 3–14)
AST SERPL W P-5'-P-CCNC: 14 U/L (ref 0–45)
BILIRUB SERPL-MCNC: 0.4 MG/DL (ref 0.2–1.3)
BUN SERPL-MCNC: 9 MG/DL (ref 7–30)
CALCIUM SERPL-MCNC: 8.9 MG/DL (ref 8.5–10.1)
CHLORIDE SERPL-SCNC: 104 MMOL/L (ref 94–109)
CO2 SERPL-SCNC: 25 MMOL/L (ref 20–32)
CREAT SERPL-MCNC: 0.59 MG/DL (ref 0.52–1.04)
DEPRECATED CALCIDIOL+CALCIFEROL SERPL-MC: 28 UG/L (ref 20–75)
FERRITIN SERPL-MCNC: 24 NG/ML (ref 12–150)
GFR SERPL CREATININE-BSD FRML MDRD: NORMAL ML/MIN/1.7M2
GLUCOSE SERPL-MCNC: 84 MG/DL (ref 70–99)
IRON SATN MFR SERPL: 8 % (ref 15–46)
IRON SERPL-MCNC: 26 UG/DL (ref 35–180)
POTASSIUM SERPL-SCNC: 4 MMOL/L (ref 3.4–5.3)
PROT SERPL-MCNC: 8.3 G/DL (ref 6.8–8.8)
SODIUM SERPL-SCNC: 140 MMOL/L (ref 133–144)
T4 FREE SERPL-MCNC: 1.12 NG/DL (ref 0.76–1.46)
TIBC SERPL-MCNC: 339 UG/DL (ref 240–430)
TSH SERPL DL<=0.05 MIU/L-ACNC: 8.09 MU/L (ref 0.4–4)

## 2017-07-14 ENCOUNTER — HOSPITAL ENCOUNTER (OUTPATIENT)
Dept: ULTRASOUND IMAGING | Facility: CLINIC | Age: 37
Discharge: HOME | End: 2017-07-14
Attending: NURSE PRACTITIONER | Admitting: NURSE PRACTITIONER
Payer: COMMERCIAL

## 2017-07-14 DIAGNOSIS — E55.9 VITAMIN D DEFICIENCY: ICD-10-CM

## 2017-07-14 DIAGNOSIS — N92.6 IRREGULAR PERIODS: ICD-10-CM

## 2017-07-14 DIAGNOSIS — D50.9 IRON DEFICIENCY ANEMIA, UNSPECIFIED IRON DEFICIENCY ANEMIA TYPE: ICD-10-CM

## 2017-07-14 DIAGNOSIS — E03.8 OTHER SPECIFIED HYPOTHYROIDISM: Primary | ICD-10-CM

## 2017-07-14 DIAGNOSIS — R10.2 PELVIC PAIN: ICD-10-CM

## 2017-07-14 PROCEDURE — 76830 TRANSVAGINAL US NON-OB: CPT

## 2017-07-14 RX ORDER — LEVOTHYROXINE SODIUM 112 UG/1
112 TABLET ORAL DAILY
Qty: 90 TABLET | Refills: 1 | Status: SHIPPED | OUTPATIENT
Start: 2017-07-14 | End: 2018-05-01 | Stop reason: DRUGHIGH

## 2017-07-14 RX ORDER — FERROUS SULFATE 325(65) MG
325 TABLET ORAL
Qty: 90 TABLET | Refills: 1 | Status: SHIPPED | OUTPATIENT
Start: 2017-07-14 | End: 2018-05-01

## 2017-07-14 RX ORDER — CHOLECALCIFEROL (VITAMIN D3) 50 MCG
2000 TABLET ORAL DAILY
Qty: 100 TABLET | Refills: 3 | Status: SHIPPED | OUTPATIENT
Start: 2017-07-14 | End: 2018-11-19

## 2017-08-17 DIAGNOSIS — J45.40 MODERATE PERSISTENT ASTHMA WITHOUT COMPLICATION: ICD-10-CM

## 2017-08-17 NOTE — TELEPHONE ENCOUNTER
QVAR 40 mg        Last Written Prescription Date: 03/10/2107  Last Fill Quantity: 1, # refills: 1    Last Office Visit with [facility], [facility] or [facility] prescribing provider:  07/12/2017   Future Office Visit:       Date of Last Asthma Action Plan Letter:   Asthma Action Plan Q1 Year    Topic Date Due     Asthma Action Plan - yearly  04/29/2017      Asthma Control Test:   ACT Total Scores 4/29/2016   ACT TOTAL SCORE -   ASTHMA ER VISITS -   ASTHMA HOSPITALIZATIONS -   ACT TOTAL SCORE (Goal Greater than or Equal to 20) 7   In the past 12 months, how many times did you visit the emergency room for your asthma without being admitted to the hospital? 0   In the past 12 months, how many times were you hospitalized overnight because of your asthma? 0       Date of Last Spirometry Test:   No results found for this or any previous visit.    Thanks!     Alia Pranke, PharmacyTechnician   [facility]

## 2017-08-18 NOTE — TELEPHONE ENCOUNTER
I have not seen the pt for asthma sicne 07/16 , Q natalya faxed for 1mth, advise appt, her ACT was very low in 04/16

## 2017-08-21 ENCOUNTER — TELEPHONE (OUTPATIENT)
Dept: INTERNAL MEDICINE | Facility: CLINIC | Age: 37
End: 2017-08-21

## 2017-08-21 DIAGNOSIS — J45.40 MODERATE PERSISTENT ASTHMA WITHOUT COMPLICATION: Primary | ICD-10-CM

## 2017-11-22 DIAGNOSIS — K21.9 GASTROESOPHAGEAL REFLUX DISEASE WITHOUT ESOPHAGITIS: ICD-10-CM

## 2017-11-22 RX ORDER — OMEPRAZOLE 40 MG/1
40 CAPSULE, DELAYED RELEASE ORAL DAILY
Qty: 120 CAPSULE | Refills: 11 | Status: SHIPPED | OUTPATIENT
Start: 2017-11-22 | End: 2017-11-24

## 2017-11-22 NOTE — TELEPHONE ENCOUNTER
Omeprazole 40  Last Office Visit: 07/12/2017  Last Written Prescription Date:  09/21/2016  Last Fill Quantity: 120,   # refills: 11  Future Office visit:       Routing refill request to provider for review/approval because:  Drug not on the [facility], P or [facility] refill protocol or controlled substance      Thank you  Lydia Alvarado Technician

## 2017-11-24 ENCOUNTER — TELEPHONE (OUTPATIENT)
Dept: OBGYN | Facility: CLINIC | Age: 37
End: 2017-11-24

## 2017-11-24 DIAGNOSIS — K21.9 GASTROESOPHAGEAL REFLUX DISEASE WITHOUT ESOPHAGITIS: ICD-10-CM

## 2017-11-24 NOTE — TELEPHONE ENCOUNTER
Her Insurance plan requires a prior auth for more than #120 caps per year.. Insurance ph:[phone] Id#[id number].. Thanks

## 2017-11-25 RX ORDER — OMEPRAZOLE 40 MG/1
40 CAPSULE, DELAYED RELEASE ORAL DAILY
Qty: 90 CAPSULE | Refills: 3 | Status: SHIPPED | OUTPATIENT
Start: 2017-11-25 | End: 2020-02-26

## 2017-11-25 NOTE — TELEPHONE ENCOUNTER
Lets try 90 tablets then   Dr. Zora Srinivasan, DO    Obstetrics and Gynecology  [facility] [place] and [place]

## 2017-11-28 NOTE — TELEPHONE ENCOUNTER
[facility] called and said a PA needs to be started because the pt has reached the coverage limit for the year.     PA started through Cover My Meds. PA key is [id number].    Michelle Ibarra CMA

## 2017-12-15 ENCOUNTER — OFFICE VISIT (OUTPATIENT)
Dept: INTERNAL MEDICINE | Facility: CLINIC | Age: 37
End: 2017-12-15
Payer: COMMERCIAL

## 2017-12-15 VITALS
HEIGHT: 66 IN | RESPIRATION RATE: 12 BRPM | BODY MASS INDEX: 47.09 KG/M2 | TEMPERATURE: 98.2 F | WEIGHT: 293 LBS | DIASTOLIC BLOOD PRESSURE: 68 MMHG | SYSTOLIC BLOOD PRESSURE: 108 MMHG | HEART RATE: 92 BPM | OXYGEN SATURATION: 95 %

## 2017-12-15 DIAGNOSIS — K21.9 GASTROESOPHAGEAL REFLUX DISEASE WITHOUT ESOPHAGITIS: ICD-10-CM

## 2017-12-15 DIAGNOSIS — R53.83 OTHER FATIGUE: Primary | ICD-10-CM

## 2017-12-15 DIAGNOSIS — M62.08 DIASTASIS OF RECTUS ABDOMINIS: ICD-10-CM

## 2017-12-15 DIAGNOSIS — E03.8 OTHER SPECIFIED HYPOTHYROIDISM: ICD-10-CM

## 2017-12-15 DIAGNOSIS — E55.9 VITAMIN D DEFICIENCY: ICD-10-CM

## 2017-12-15 LAB
BASOPHILS # BLD AUTO: 0 10E9/L (ref 0–0.2)
BASOPHILS NFR BLD AUTO: 0.3 %
DIFFERENTIAL METHOD BLD: ABNORMAL
EOSINOPHIL # BLD AUTO: 0.3 10E9/L (ref 0–0.7)
EOSINOPHIL NFR BLD AUTO: 4 %
ERYTHROCYTE [DISTWIDTH] IN BLOOD BY AUTOMATED COUNT: 16.3 % (ref 10–15)
HCT VFR BLD AUTO: 39.9 % (ref 35–47)
HGB BLD-MCNC: 12.8 G/DL (ref 11.7–15.7)
LYMPHOCYTES # BLD AUTO: 2.1 10E9/L (ref 0.8–5.3)
LYMPHOCYTES NFR BLD AUTO: 31 %
MCH RBC QN AUTO: 26.5 PG (ref 26.5–33)
MCHC RBC AUTO-ENTMCNC: 32.1 G/DL (ref 31.5–36.5)
MCV RBC AUTO: 83 FL (ref 78–100)
MONOCYTES # BLD AUTO: 0.6 10E9/L (ref 0–1.3)
MONOCYTES NFR BLD AUTO: 9.5 %
NEUTROPHILS # BLD AUTO: 3.7 10E9/L (ref 1.6–8.3)
NEUTROPHILS NFR BLD AUTO: 55.2 %
PLATELET # BLD AUTO: 255 10E9/L (ref 150–450)
RBC # BLD AUTO: 4.83 10E12/L (ref 3.8–5.2)
WBC # BLD AUTO: 6.7 10E9/L (ref 4–11)

## 2017-12-15 PROCEDURE — 83550 IRON BINDING TEST: CPT | Performed by: PATHOLOGY

## 2017-12-15 PROCEDURE — 82728 ASSAY OF FERRITIN: CPT | Performed by: PATHOLOGY

## 2017-12-15 PROCEDURE — 99215 OFFICE O/P EST HI 40 MIN: CPT | Performed by: NURSE PRACTITIONER

## 2017-12-15 PROCEDURE — 84439 ASSAY OF FREE THYROXINE: CPT | Performed by: PATHOLOGY

## 2017-12-15 PROCEDURE — 80050 GENERAL HEALTH PANEL: CPT | Performed by: NURSE PRACTITIONER

## 2017-12-15 PROCEDURE — 36415 COLL VENOUS BLD VENIPUNCTURE: CPT | Performed by: NURSE PRACTITIONER

## 2017-12-15 PROCEDURE — 83540 ASSAY OF IRON: CPT | Performed by: PATHOLOGY

## 2017-12-15 PROCEDURE — 82306 VITAMIN D 25 HYDROXY: CPT | Performed by: NURSE PRACTITIONER

## 2017-12-15 RX ORDER — LEVOTHYROXINE SODIUM 100 UG/1
100 TABLET ORAL DAILY
Qty: 90 TABLET | Refills: 1 | COMMUNITY
Start: 2017-12-15 | End: 2017-12-20

## 2017-12-15 NOTE — TELEPHONE ENCOUNTER
Can we check and see where this PA is at  Patient needs to have medication for her stomach  Helps her reflux symptoms and she takes daily   Eating makes her bloat and this helps that and pain

## 2017-12-15 NOTE — PROGRESS NOTES
SUBJECTIVE:   Gabi Roblero is a 37 year old female who presents to clinic today for the following health issues:   Fatigue       Pt c/o feeling tired and weak for about a year.  Thyroid med was recently decreased.    Taking  100 mcg tab daily   Even though 112 mcg was ordered     Needs omeprazole   Prior auth in progress   Needs to be checked on   Note to nursing   She bough OTC medication and is taking     EGD 3/2017  Reviewed and no concerning finding   However was taking medication for stomach at the time   Omeprazole helps her feel better and have less gas  Would think she should continue     Diastasis recti   Has separation of muscle in abdomen   Causes her pain   Will have her see surgery to see if any intervention possible - she wants to see specialist             Problem list and histories reviewed & adjusted, as indicated.  Additional history: as documented    Patient Active Problem List   Diagnosis     Gastritis     CARDIOVASCULAR SCREENING; LDL GOAL LESS THAN 160     Vitamin D deficiency     GERD (gastroesophageal reflux disease)     Rotator cuff syndrome     Moderate persistent asthma     Supervision of elderly multigravida     AMA (advanced maternal age) multigravida 35+     Other specified hypothyroidism     Normal  (single liveborn)     Indication for care in labor or delivery     Labor and delivery indication for care or intervention     Past Surgical History:   Procedure Laterality Date     CHOLECYSTECTOMY         Social History   Substance Use Topics     Smoking status: Never Smoker     Smokeless tobacco: Never Used     Alcohol use No     Family History   Problem Relation Age of Onset     DIABETES Father      Family History Negative Mother      CANCER No family hx of      CEREBROVASCULAR DISEASE No family hx of      Hypertension No family hx of      C.A.D. No family hx of      Asthma No family hx of              Reviewed and updated as needed this visit by clinical staffTobacco  " Allergies  Soc Hx      Reviewed and updated as needed this visit by Provider         ROS:  Constitutional, HEENT, cardiovascular, pulmonary, GI, , musculoskeletal, neuro, skin, endocrine and psych systems are negative, except as otherwise noted.      OBJECTIVE:   /68 (BP Location: Left arm, Patient Position: Chair, Cuff Size: Adult Large)  Pulse 92  Temp 98.2  F (36.8  C) (Oral)  Resp 12  Ht 5' 6\" (1.676 m)  Wt (!) 306 lb (138.8 kg)  LMP 12/15/2017  SpO2 95%  BMI 49.39 kg/m2  Body mass index is 49.39 kg/(m^2).  GENERAL: alert and no distress; here with    RESP: lungs clear to auscultation - no rales, rhonchi or wheezes  CV: regular rate and rhythm  ABDOMEN: soft, has what feels like separation of muscles mid abdomen - complains of discomfort with exam,  and bowel sounds normal- exam limited by body habitus   MS: no gross musculoskeletal defects noted, no edema  NEURO: Normal strength and tone, mentation intact and speech normal  PSYCH: mentation appears normal, affect normal/bright    Diagnostic Test Results:  Labs     ASSESSMENT/PLAN:             1. Other fatigue    - TSH  - T4 free  - CBC with platelets differential  - Vitamin D Deficiency  - Comprehensive metabolic panel  - Iron and iron binding capacity  - Ferritin    2. Vitamin D deficiency    - Vitamin D Deficiency    3. Gastroesophageal reflux disease without esophagitis    - Comprehensive metabolic panel    4. Other specified hypothyroidism    - TSH  - T4 free  - levothyroxine (SYNTHROID/LEVOTHROID) 100 MCG tablet; Take 1 tablet (100 mcg) by mouth daily  Dispense: 90 tablet; Refill: 1    5. Diastasis of rectus abdominis    - GENERAL SURG ADULT REFERRAL    Patient Instructions   Lab in [address]    Omeprazole 40 mg daily   Buy over the counter until we try to check on prior auth     We will let you know thyroid dose you will be on when labs comes back     [facility]: [facility] [place] [phone]   " [email or web address]      CARLOS Brown [facility]

## 2017-12-15 NOTE — NURSING NOTE
"Chief Complaint   Patient presents with     Fatigue     Pt c/o feeling tired and weak for about a year.  Thyroid med was recently decreased.        Initial /68 (BP Location: Left arm, Patient Position: Chair, Cuff Size: Adult Large)  Pulse 92  Temp 98.2  F (36.8  C) (Oral)  Resp 12  Ht 5' 6\" (1.676 m)  Wt (!) 306 lb (138.8 kg)  LMP 12/15/2017  SpO2 95%  BMI 49.39 kg/m2 Estimated body mass index is 49.39 kg/(m^2) as calculated from the following:    Height as of this encounter: 5' 6\" (1.676 m).    Weight as of this encounter: 306 lb (138.8 kg).  Medication Reconciliation: complete     STEPHANIE Carter      "

## 2017-12-15 NOTE — MR AVS SNAPSHOT
After Visit Summary   12/15/2017    Gabi Roblero    MRN: [id number]           Patient Information     Date Of Birth          1980        Visit Information        Provider Department      12/15/2017 2:45 PM Jamilah Anguiano APRN CNP; [employer] [facility]        Today's Diagnoses     Other fatigue    -  1    Vitamin D deficiency        Gastroesophageal reflux disease without esophagitis        Other specified hypothyroidism        Diastasis of rectus abdominis          Care Instructions    Lab in [address]    Omeprazole 40 mg daily   Buy over the counter until we try to check on prior auth     We will let you know thyroid dose you will be on when labs comes back     FMG: [facility] [phone]   [email or web address]          Follow-ups after your visit        Additional Services     GENERAL SURG ADULT REFERRAL       Your provider has referred you to: FMG: [facility] [phone]   [email or web address]    Please be aware that coverage of these services is subject to the terms and limitations of your health insurance plan.  Call member services at your health plan with any benefit or coverage questions.      Please bring the following with you to your appointment:    (1) Any X-Rays, CTs or MRIs which have been performed.  Contact the facility where they were done to arrange for  prior to your scheduled appointment.   (2) List of current medications   (3) This referral request   (4) Any documents/labs given to you for this referral                  Who to contact     If you have questions or need follow up information about today's clinic visit or your schedule please contact [facility] directly at [phone].  Normal or non-critical lab and imaging results will be communicated to you by Alejandra  "letter or phone within 4 business days after the clinic has received the results. If you do not hear from us within 7 days, please contact the clinic through [facility] or phone. If you have a critical or abnormal lab result, we will notify you by phone as soon as possible.  Submit refill requests through [employer] or call your pharmacy and they will forward the refill request to us. Please allow 3 business days for your refill to be completed.          Additional Information About Your Visit        [facility] Information     [facility] lets you send messages to your doctor, view your test results, renew your prescriptions, schedule appointments and more. To sign up, go to [email or web address] . Click on \"Log in\" on the left side of the screen, which will take you to the Welcome page. Then click on \"Sign up Now\" on the right side of the page.     You will be asked to enter the access code listed below, as well as some personal information. Please follow the directions to create your username and password.     Your access code is: [email or web address]  Expires: 3/15/2018  4:01 PM     Your access code will  in 90 days. If you need help or a new code, please call your [place] clinic or [phone].        Care EveryWhere ID     This is your Care EveryWhere ID. This could be used by other organizations to access your [place] medical records  [id number]        Your Vitals Were     Pulse Temperature Respirations Height Last Period Pulse Oximetry    92 98.2  F (36.8  C) (Oral) 12 5' 6\" (1.676 m) 12/15/2017 95%    BMI (Body Mass Index)                   49.39 kg/m2            Blood Pressure from Last 3 Encounters:   12/15/17 108/68   17 110/80   17 114/72    Weight from Last 3 Encounters:   12/15/17 (!) 306 lb (138.8 kg)   17 (!) 303 lb (137.4 kg)   17 (!) 301 lb 1.6 oz (136.6 kg)              We Performed the Following     CBC with platelets differential     Comprehensive metabolic panel     Ferritin     " GENERAL SURG ADULT REFERRAL     Iron and iron binding capacity     T4 free     TSH     Vitamin D Deficiency          Today's Medication Changes          These changes are accurate as of: 12/15/17  4:01 PM.  If you have any questions, ask your nurse or doctor.               These medicines have changed or have updated prescriptions.        Dose/Directions    beclomethasone 40 MCG/ACT Inhaler   Commonly known as:  QVAR   This may have changed:  Another medication with the same name was removed. Continue taking this medication, and follow the directions you see here.        Dose:  2 puff   Inhale 2 puffs into the lungs 2 times daily   Quantity:  1 Inhaler   Refills:  0                Primary Care Provider Office Phone # Fax #    Omarkumari AMY MD Sina [phone]       [address]  [facility] [address]        Equal Access to Services     JING BELLE : Abraham hernandez Sokenneth, waaxda luqadaha, qaybta kaalmada adeegyada, hayder guevara . So [facility] [phone].    ATENCIÓN: Si habla español, tiene a sims disposición servicios gratuitos de asistencia lingüística. Llame al [phone].    We comply with applicable federal civil rights laws and [place] laws. We do not discriminate on the basis of race, color, national origin, age, disability, sex, sexual orientation, or gender identity.            Thank you!     Thank you for choosing [facility]  for your care. Our goal is always to provide you with excellent care. Hearing back from our patients is one way we can continue to improve our services. Please take a few minutes to complete the written survey that you may receive in the mail after your visit with us. Thank you!             Your Updated Medication List - Protect others around you: Learn how to safely use, store and throw away your medicines at www.disposemymeds.org.          This list is accurate as of: 12/15/17  4:01 PM.  Always use your  most recent med list.                   Brand Name Dispense Instructions for use Diagnosis    acetaminophen 325 MG tablet    TYLENOL    100 tablet    Take 2 tablets (650 mg) by mouth every 4 hours as needed for mild pain or fever (greater than or equal to 38? C /100.4? F (oral) or 38.5? C/ 101.4? F (core).)     (spontaneous vaginal delivery)       * albuterol (2.5 MG/3ML) 0.083% neb solution     30 vial    Take 1 vial (2.5 mg) by nebulization every 6 hours as needed for shortness of breath / dyspnea or wheezing    Moderate persistent asthma without complication       * albuterol 108 (90 BASE) MCG/ACT Inhaler    PROAIR HFA    1 Inhaler    Inhale 2 puffs into the lungs every 4 hours as needed for shortness of breath / dyspnea        Alum & Mag Hydroxide-Simeth 200-200-20 MG Chew     480 tablet    Take 1-2 tablets by mouth every 4 hours as needed    Flatulence, eructation and gas pain       beclomethasone 40 MCG/ACT Inhaler    QVAR    1 Inhaler    Inhale 2 puffs into the lungs 2 times daily        docusate sodium 100 MG tablet    COLACE    60 tablet    Take 100 mg by mouth daily    Constipation, unspecified constipation type       ferrous sulfate 325 (65 FE) MG tablet    IRON    90 tablet    Take 1 tablet (325 mg) by mouth daily (with breakfast)    Iron deficiency anemia, unspecified iron deficiency anemia type       fluticasone furoate 200 MCG/ACT inhalation powder    ARNUITY ELLIPTA    1 Inhaler    Inhale 1 puff into the lungs daily    Moderate persistent asthma without complication       ibuprofen 400 MG tablet    ADVIL/MOTRIN    120 tablet    Take 2 tablets (800 mg) by mouth every 6 hours as needed for other (cramping)     (spontaneous vaginal delivery)       levothyroxine 112 MCG tablet    SYNTHROID/LEVOTHROID    90 tablet    Take 1 tablet (112 mcg) by mouth daily    Other specified hypothyroidism       metoclopramide 5 MG tablet    REGLAN    20 tablet    Take 1 tablet (5 mg) by mouth 3 times daily as needed  (Nausea or Vomiting)        omeprazole 40 MG capsule    priLOSEC    90 capsule    Take 1 capsule (40 mg) by mouth daily    Gastroesophageal reflux disease without esophagitis       order for DME     1 Device    Equipment being ordered: Nebulizer    Moderate persistent asthma without complication       polyethylene glycol powder    MIRALAX    510 g    Take 17 g (1 capful) by mouth daily    Constipation, unspecified constipation type       prenatal multivitamin plus iron 27-0.8 MG Tabs per tablet     100 tablet    Take 1 tablet by mouth daily    Supervision of elderly multigravida, second trimester, Irregular periods       senna-docusate 8.6-50 MG per tablet    SENOKOT-S;PERICOLACE    100 tablet    Take 1-2 tablets by mouth 2 times daily     (spontaneous vaginal delivery)       sucralfate 1 GM/10ML suspension    CARAFATE    420 mL    Take 10 mLs (1 g) by mouth 4 times daily    Gastroesophageal reflux disease without esophagitis, Abdominal pain, epigastric       vitamin D 2000 UNITS tablet     100 tablet    Take 2,000 Units by mouth daily    Iron deficiency anemia, unspecified iron deficiency anemia type       * Notice:  This list has 2 medication(s) that are the same as other medications prescribed for you. Read the directions carefully, and ask your doctor or other care provider to review them with you.

## 2017-12-15 NOTE — PATIENT INSTRUCTIONS
Lab in [address]    Omeprazole 40 mg daily   Buy over the counter until we try to check on prior auth     We will let you know thyroid dose you will be on when labs comes back     FMG: [facility] [place] [phone]   [email or web address]

## 2017-12-16 LAB
ALBUMIN SERPL-MCNC: 3.2 G/DL (ref 3.4–5)
ALP SERPL-CCNC: 98 U/L (ref 40–150)
ALT SERPL W P-5'-P-CCNC: 16 U/L (ref 0–50)
ANION GAP SERPL CALCULATED.3IONS-SCNC: 7 MMOL/L (ref 3–14)
AST SERPL W P-5'-P-CCNC: 15 U/L (ref 0–45)
BILIRUB SERPL-MCNC: 0.2 MG/DL (ref 0.2–1.3)
BUN SERPL-MCNC: 11 MG/DL (ref 7–30)
CALCIUM SERPL-MCNC: 8.7 MG/DL (ref 8.5–10.1)
CHLORIDE SERPL-SCNC: 105 MMOL/L (ref 94–109)
CO2 SERPL-SCNC: 28 MMOL/L (ref 20–32)
CREAT SERPL-MCNC: 0.57 MG/DL (ref 0.52–1.04)
FERRITIN SERPL-MCNC: 40 NG/ML (ref 12–150)
GFR SERPL CREATININE-BSD FRML MDRD: >90 ML/MIN/1.7M2
GLUCOSE SERPL-MCNC: 67 MG/DL (ref 70–99)
IRON SATN MFR SERPL: 16 % (ref 15–46)
IRON SERPL-MCNC: 49 UG/DL (ref 35–180)
POTASSIUM SERPL-SCNC: 3.4 MMOL/L (ref 3.4–5.3)
PROT SERPL-MCNC: 7.8 G/DL (ref 6.8–8.8)
SODIUM SERPL-SCNC: 140 MMOL/L (ref 133–144)
T4 FREE SERPL-MCNC: 1.24 NG/DL (ref 0.76–1.46)
TIBC SERPL-MCNC: 299 UG/DL (ref 240–430)
TSH SERPL DL<=0.005 MIU/L-ACNC: 3.83 MU/L (ref 0.4–4)

## 2017-12-19 LAB — DEPRECATED CALCIDIOL+CALCIFEROL SERPL-MC: 31 UG/L (ref 20–75)

## 2017-12-20 DIAGNOSIS — E03.8 OTHER SPECIFIED HYPOTHYROIDISM: ICD-10-CM

## 2017-12-20 RX ORDER — LEVOTHYROXINE SODIUM 100 UG/1
100 TABLET ORAL DAILY
Qty: 90 TABLET | Refills: 3 | Status: SHIPPED | OUTPATIENT
Start: 2017-12-20 | End: 2018-11-24 | Stop reason: DRUGHIGH

## 2017-12-21 NOTE — TELEPHONE ENCOUNTER
Received fax stating that PA was denied due to requested quantity is larger than pt's benefit will allow.    Michelle Ibarra CMA

## 2018-01-05 DIAGNOSIS — J45.40 MODERATE PERSISTENT ASTHMA: Primary | ICD-10-CM

## 2018-01-08 RX ORDER — ALBUTEROL SULFATE 90 UG/1
2 AEROSOL, METERED RESPIRATORY (INHALATION) EVERY 4 HOURS PRN
Qty: 1 INHALER | Refills: 0 | Status: SHIPPED | OUTPATIENT
Start: 2018-01-08 | End: 2018-05-06

## 2018-05-01 ENCOUNTER — OFFICE VISIT (OUTPATIENT)
Dept: INTERNAL MEDICINE | Facility: CLINIC | Age: 38
End: 2018-05-01
Payer: COMMERCIAL

## 2018-05-01 ENCOUNTER — RADIANT APPOINTMENT (OUTPATIENT)
Dept: GENERAL RADIOLOGY | Facility: CLINIC | Age: 38
End: 2018-05-01
Attending: NURSE PRACTITIONER
Payer: COMMERCIAL

## 2018-05-01 VITALS
BODY MASS INDEX: 47.09 KG/M2 | HEART RATE: 88 BPM | OXYGEN SATURATION: 94 % | WEIGHT: 293 LBS | DIASTOLIC BLOOD PRESSURE: 60 MMHG | TEMPERATURE: 98.2 F | SYSTOLIC BLOOD PRESSURE: 90 MMHG | RESPIRATION RATE: 16 BRPM | HEIGHT: 66 IN

## 2018-05-01 DIAGNOSIS — M79.672 FOOT PAIN, BILATERAL: Primary | ICD-10-CM

## 2018-05-01 DIAGNOSIS — D50.9 IRON DEFICIENCY ANEMIA, UNSPECIFIED IRON DEFICIENCY ANEMIA TYPE: ICD-10-CM

## 2018-05-01 DIAGNOSIS — J45.40 MODERATE PERSISTENT ASTHMA WITHOUT COMPLICATION: ICD-10-CM

## 2018-05-01 DIAGNOSIS — M79.672 FOOT PAIN, BILATERAL: ICD-10-CM

## 2018-05-01 DIAGNOSIS — M79.671 FOOT PAIN, BILATERAL: Primary | ICD-10-CM

## 2018-05-01 DIAGNOSIS — N92.6 IRREGULAR PERIODS: ICD-10-CM

## 2018-05-01 DIAGNOSIS — O09.522 SUPERVISION OF ELDERLY MULTIGRAVIDA, SECOND TRIMESTER: ICD-10-CM

## 2018-05-01 DIAGNOSIS — M72.2 PLANTAR FASCIITIS: ICD-10-CM

## 2018-05-01 DIAGNOSIS — M79.671 FOOT PAIN, BILATERAL: ICD-10-CM

## 2018-05-01 PROCEDURE — 99214 OFFICE O/P EST MOD 30 MIN: CPT | Performed by: NURSE PRACTITIONER

## 2018-05-01 PROCEDURE — 73620 X-RAY EXAM OF FOOT: CPT | Mod: 50

## 2018-05-01 RX ORDER — FERROUS SULFATE 325(65) MG
325 TABLET ORAL
Qty: 90 TABLET | Refills: 1 | Status: SHIPPED | OUTPATIENT
Start: 2018-05-01 | End: 2018-11-19

## 2018-05-01 RX ORDER — NAPROXEN 500 MG/1
500 TABLET ORAL 2 TIMES DAILY PRN
Qty: 60 TABLET | Refills: 0 | Status: SHIPPED | OUTPATIENT
Start: 2018-05-01 | End: 2018-11-19

## 2018-05-01 RX ORDER — PRENATAL VIT/IRON FUM/FOLIC AC 27MG-0.8MG
1 TABLET ORAL DAILY
Qty: 100 TABLET | Refills: 3 | Status: SHIPPED | OUTPATIENT
Start: 2018-05-01 | End: 2018-11-19

## 2018-05-01 NOTE — NURSING NOTE
"Chief Complaint   Patient presents with     Musculoskeletal Problem       Initial BP 90/60 (Cuff Size: Adult Large)  Pulse 88  Temp 98.2  F (36.8  C) (Oral)  Resp 16  Ht 5' 6\" (1.676 m)  Wt 311 lb 8 oz (141.3 kg)  SpO2 94%  BMI 50.28 kg/m2 Estimated body mass index is 50.28 kg/(m^2) as calculated from the following:    Height as of this encounter: 5' 6\" (1.676 m).    Weight as of this encounter: 311 lb 8 oz (141.3 kg).  Medication Reconciliation: complete    "

## 2018-05-01 NOTE — MR AVS SNAPSHOT
After Visit Summary   5/1/2018    Gabi Roblero    MRN: [id number]           Patient Information     Date Of Birth          1980        Visit Information        Provider Department      5/1/2018 3:45 PM Jamilah Anguiano APRN CNP; [employer] [facility]        Today's Diagnoses     Foot pain, bilateral    -  1    Iron deficiency anemia, unspecified iron deficiency anemia type        Supervision of elderly multigravida, second trimester        Irregular periods        Moderate persistent asthma without complication        Plantar fasciitis          Care Instructions    X ray in [address]    Ice to foot     May try exercises   Up to date exercises given     Naprosyn twice daily for foot pain with food      FMG: [facility]  [phone]   [email or web address]/    Podiatry appointment if not improving               Follow-ups after your visit        Additional Services     PODIATRY/FOOT & ANKLE SURGERY REFERRAL       Your provider has referred you to: FMG: [facility]  [phone]   [email or web address]    Please be aware that coverage of these services is subject to the terms and limitations of your health insurance plan.  Call member services at your health plan with any benefit or coverage questions.      Please bring the following to your appointment:  >>   Any x-rays, CTs or MRIs which have been performed.  Contact the facility where they were done to arrange for  prior to your scheduled appointment.    >>   List of current medications   >>   This referral request   >>   Any documents/labs given to you for this referral                  Future tests that were ordered for you today     Open  "Future Orders        Priority Expected Expires Ordered    XR Foot Bilateral 2 Views Routine 2018            Who to contact     If you have questions or need follow up information about today's clinic visit or your schedule please contact [facility] directly at [phone].  Normal or non-critical lab and imaging results will be communicated to you by MyChart, letter or phone within 4 business days after the clinic has received the results. If you do not hear from us within 7 days, please contact the clinic through [employer] or phone. If you have a critical or abnormal lab result, we will notify you by phone as soon as possible.  Submit refill requests through [employer] or call your pharmacy and they will forward the refill request to us. Please allow 3 business days for your refill to be completed.          Additional Information About Your Visit        MyChart Information     [employer] lets you send messages to your doctor, view your test results, renew your prescriptions, schedule appointments and more. To sign up, go to [email or web address] . Click on \"Log in\" on the left side of the screen, which will take you to the Welcome page. Then click on \"Sign up Now\" on the right side of the page.     You will be asked to enter the access code listed below, as well as some personal information. Please follow the directions to create your username and password.     Your access code is: [email or web address]  Expires: 2018  5:00 PM     Your access code will  in 90 days. If you need help or a new code, please call your [place] clinic or [phone].        Care EveryWhere ID     This is your Care EveryWhere ID. This could be used by other organizations to access your [place] medical records  [id number]        Your Vitals Were     Pulse Temperature Respirations Height Pulse Oximetry BMI (Body Mass Index)    88 98.2  F (36.8  C) (Oral) 16 5' 6\" (1.676 m) 94% 50.28 kg/m2       Blood " Pressure from Last 3 Encounters:   05/01/18 90/60   12/15/17 108/68   07/12/17 110/80    Weight from Last 3 Encounters:   05/01/18 311 lb 8 oz (141.3 kg)   12/15/17 (!) 306 lb (138.8 kg)   07/12/17 (!) 303 lb (137.4 kg)              We Performed the Following     PODIATRY/FOOT & ANKLE SURGERY REFERRAL          Today's Medication Changes          These changes are accurate as of 5/1/18  5:00 PM.  If you have any questions, ask your nurse or doctor.               Start taking these medicines.        Dose/Directions    naproxen 500 MG tablet   Commonly known as:  NAPROSYN   Used for:  Foot pain, bilateral   Started by:  Jamilah Anguiano APRN CNP        Dose:  500 mg   Take 1 tablet (500 mg) by mouth 2 times daily as needed for moderate pain   Quantity:  60 tablet   Refills:  0         These medicines have changed or have updated prescriptions.        Dose/Directions    levothyroxine 100 MCG tablet   Commonly known as:  SYNTHROID/LEVOTHROID   This may have changed:  Another medication with the same name was removed. Continue taking this medication, and follow the directions you see here.   Used for:  Other specified hypothyroidism   Changed by:  Jamilah Anguiano APRN CNP        Dose:  100 mcg   Take 1 tablet (100 mcg) by mouth daily   Quantity:  90 tablet   Refills:  3         Stop taking these medicines if you haven't already. Please contact your care team if you have questions.     beclomethasone 40 MCG/ACT Inhaler   Commonly known as:  QVAR   Stopped by:  Jamilah Anguiano APRN CNP                Where to get your medicines      These medications were sent to [facility] [place], [place] - [address]  [address] [facility] [address]     Phone:  [phone]     ferrous sulfate 325 (65 Fe) MG tablet    fluticasone furoate 200 MCG/ACT inhalation powder    naproxen 500 MG tablet    prenatal multivitamin plus iron 27-0.8 MG Tabs per tablet                Primary Care  Provider Office Phone # Fax #    Krishnakumari AMY MD Sina [phone]       [address] [facility] [address]        Equal Access to Services     MIRNAZAID YOSHI : Hadii aad ku hadmagdalenoo Soomaali, waaxda luqadaha, qaybta kaalmada adeernestoda, hayder poncelinn merchant. So [facility] [phone].    ATENCIÓN: Si habla español, tiene a sims disposición servicios gratuitos de asistencia lingüística. Llame al [phone].    We comply with applicable federal civil rights laws and [place] laws. We do not discriminate on the basis of race, color, national origin, age, disability, sex, sexual orientation, or gender identity.            Thank you!     Thank you for choosing [facility]  for your care. Our goal is always to provide you with excellent care. Hearing back from our patients is one way we can continue to improve our services. Please take a few minutes to complete the written survey that you may receive in the mail after your visit with us. Thank you!             Your Updated Medication List - Protect others around you: Learn how to safely use, store and throw away your medicines at www.disposemymeds.org.          This list is accurate as of 18  5:00 PM.  Always use your most recent med list.                   Brand Name Dispense Instructions for use Diagnosis    acetaminophen 325 MG tablet    TYLENOL    100 tablet    Take 2 tablets (650 mg) by mouth every 4 hours as needed for mild pain or fever (greater than or equal to 38? C /100.4? F (oral) or 38.5? C/ 101.4? F (core).)     (spontaneous vaginal delivery)       * albuterol (2.5 MG/3ML) 0.083% neb solution     30 vial    Take 1 vial (2.5 mg) by nebulization every 6 hours as needed for shortness of breath / dyspnea or wheezing    Moderate persistent asthma without complication       * albuterol 108 (90 Base) MCG/ACT Inhaler    PROAIR HFA    1 Inhaler    Inhale 2 puffs into the lungs every 4 hours as needed for  shortness of breath / dyspnea    Moderate persistent asthma       docusate sodium 100 MG tablet    COLACE    60 tablet    Take 100 mg by mouth daily    Constipation, unspecified constipation type       ferrous sulfate 325 (65 Fe) MG tablet    IRON    90 tablet    Take 1 tablet (325 mg) by mouth daily (with breakfast)    Iron deficiency anemia, unspecified iron deficiency anemia type       fluticasone furoate 200 MCG/ACT inhalation powder    ARNUITY ELLIPTA    1 Inhaler    Inhale 1 puff into the lungs daily    Moderate persistent asthma without complication       ibuprofen 400 MG tablet    ADVIL/MOTRIN    120 tablet    Take 2 tablets (800 mg) by mouth every 6 hours as needed for other (cramping)     (spontaneous vaginal delivery)       levothyroxine 100 MCG tablet    SYNTHROID/LEVOTHROID    90 tablet    Take 1 tablet (100 mcg) by mouth daily    Other specified hypothyroidism       naproxen 500 MG tablet    NAPROSYN    60 tablet    Take 1 tablet (500 mg) by mouth 2 times daily as needed for moderate pain    Foot pain, bilateral       omeprazole 40 MG capsule    priLOSEC    90 capsule    Take 1 capsule (40 mg) by mouth daily    Gastroesophageal reflux disease without esophagitis       order for DME     1 Device    Equipment being ordered: Nebulizer    Moderate persistent asthma without complication       polyethylene glycol powder    MIRALAX    510 g    Take 17 g (1 capful) by mouth daily    Constipation, unspecified constipation type       prenatal multivitamin plus iron 27-0.8 MG Tabs per tablet     100 tablet    Take 1 tablet by mouth daily    Supervision of elderly multigravida, second trimester, Irregular periods       vitamin D 2000 units tablet     100 tablet    Take 2,000 Units by mouth daily    Iron deficiency anemia, unspecified iron deficiency anemia type       * Notice:  This list has 2 medication(s) that are the same as other medications prescribed for you. Read the directions carefully, and ask your  doctor or other care provider to review them with you.

## 2018-05-01 NOTE — PROGRESS NOTES
SUBJECTIVE:   Gabi Roblero is a 38 year old female who presents to clinic today for the following health issues:      Patient here with both bottom feet pain especially right one.  Feel like she is being stabbed   When she starts from resting has pain   Cannot stand up for long - better with walking around     Asthma well controlled with current medication   ACT Total Scores 4/21/2016 4/29/2016 5/1/2018   ACT TOTAL SCORE - - -   ASTHMA ER VISITS - - -   ASTHMA HOSPITALIZATIONS - - -   ACT TOTAL SCORE (Goal Greater than or Equal to 20) 8 7 21   In the past 12 months, how many times did you visit the emergency room for your asthma without being admitted to the hospital? 0 0 0   In the past 12 months, how many times were you hospitalized overnight because of your asthma? 0 0 0                 Problem list and histories reviewed & adjusted, as indicated.  Additional history: as documented    Patient Active Problem List   Diagnosis     Gastritis     CARDIOVASCULAR SCREENING; LDL GOAL LESS THAN 160     Vitamin D deficiency     GERD (gastroesophageal reflux disease)     Rotator cuff syndrome     Moderate persistent asthma     Other specified hypothyroidism     Past Surgical History:   Procedure Laterality Date     CHOLECYSTECTOMY  2003       Social History   Substance Use Topics     Smoking status: Never Smoker     Smokeless tobacco: Never Used     Alcohol use No     Family History   Problem Relation Age of Onset     DIABETES Father      Family History Negative Mother      CANCER No family hx of      CEREBROVASCULAR DISEASE No family hx of      Hypertension No family hx of      C.A.D. No family hx of      Asthma No family hx of            Reviewed and updated as needed this visit by clinical staff  Tobacco  Allergies  Meds  Problems  Med Hx  Surg Hx  Fam Hx  Soc Hx        Reviewed and updated as needed this visit by Provider         ROS:  Constitutional, HEENT, cardiovascular, pulmonary, gi and gu systems  "are negative, except as otherwise noted.    OBJECTIVE:     BP 90/60 (Cuff Size: Adult Large)  Pulse 88  Temp 98.2  F (36.8  C) (Oral)  Resp 16  Ht 5' 6\" (1.676 m)  Wt 311 lb 8 oz (141.3 kg)  SpO2 94%  BMI 50.28 kg/m2  Body mass index is 50.28 kg/(m^2).  GENERAL:  alert and no distress- here with    RESP: lungs clear to auscultation - no rales, rhonchi or wheezes  CV: regular rate and rhythm  MS: pain in base of heel and heel bilaterally   PSYCH: mentation appears normal, affect normal/bright    Diagnostic Test Results:  X ray     ASSESSMENT/PLAN:             1. Iron deficiency anemia, unspecified iron deficiency anemia type  Breast feeding 1 year old   - ferrous sulfate (IRON) 325 (65 Fe) MG tablet; Take 1 tablet (325 mg) by mouth daily (with breakfast)  Dispense: 90 tablet; Refill: 1    2. Supervision of elderly multigravida, second trimester  Breast feeding 1 year old   - Prenatal Vit-Fe Fumarate-FA (PRENATAL MULTIVITAMIN PLUS IRON) 27-0.8 MG TABS per tablet; Take 1 tablet by mouth daily  Dispense: 100 tablet; Refill: 3    3. Irregular periods    - Prenatal Vit-Fe Fumarate-FA (PRENATAL MULTIVITAMIN PLUS IRON) 27-0.8 MG TABS per tablet; Take 1 tablet by mouth daily  Dispense: 100 tablet; Refill: 3    4. Moderate persistent asthma without complication  Well controlled   - fluticasone furoate (ARNUITY ELLIPTA) 200 MCG/ACT inhalation powder; Inhale 1 puff into the lungs daily  Dispense: 1 Inhaler; Refill: 3    5. Foot pain, bilateral    - XR Foot Bilateral 2 Views; Future  - naproxen (NAPROSYN) 500 MG tablet; Take 1 tablet (500 mg) by mouth 2 times daily as needed for moderate pain  Dispense: 60 tablet; Refill: 0  - PODIATRY/FOOT & ANKLE SURGERY REFERRAL    6. Plantar fasciitis        Patient Instructions   X ray in [address]    Ice to foot     May try exercises   Up to date exercises given     Naprosyn twice daily for foot pain with food      [facility]: [facility] -  " [facility]  [phone]   [email or web address]    Podiatry appointment if not improving           CARLOS Brown CNP  [facility]

## 2018-05-02 ASSESSMENT — ASTHMA QUESTIONNAIRES: ACT_TOTALSCORE: 21

## 2018-05-06 ENCOUNTER — NURSE TRIAGE (OUTPATIENT)
Dept: NURSING | Facility: CLINIC | Age: 38
End: 2018-05-06

## 2018-05-06 DIAGNOSIS — J45.40 MODERATE PERSISTENT ASTHMA WITHOUT COMPLICATION: ICD-10-CM

## 2018-05-06 RX ORDER — ALBUTEROL SULFATE 0.83 MG/ML
1 SOLUTION RESPIRATORY (INHALATION) EVERY 6 HOURS PRN
Qty: 30 VIAL | Refills: 0 | Status: SHIPPED | OUTPATIENT
Start: 2018-05-06 | End: 2020-02-26

## 2018-05-06 NOTE — TELEPHONE ENCOUNTER
Patient's sister called with patient present. Patient gave verbal permission for FNA to speak with her sister regarding her medical care. Patient is requesting a refill on her albuterol inhaler and albuterol nebulizer solution (see below). Patient is also requesting for the prescriptions to be sent to the 24 hour [facility] in [place] off of [address]. This writer reported she will call patient back once the refills have been sent to the requested pharmacy.    This writer refilled patient's albuterol inhaler and albuterol nebulizer solution per refill protocol since patient has been seen at [facility] by a provider on 5/1/18.     This writer called patient back and spoke with her sister to inform her that the requested refills have been sent to the requested pharmacy. Advised for patient to make an appointment to be seen within one month by provider to receive new prescriptions since the ones refilled today do not have any refills available. Patient's sister agreed with plan.     albuterol (PROAIR HFA) 108 (90 Base) MCG/ACT Inhaler 1 Inhaler 0 5/6/2018  No   Sig: Inhale 2 puffs into the lungs every 4 hours as needed for shortness of breath / dyspnea   Class: E-Prescribe   Route: Inhalation       albuterol (2.5 MG/3ML) 0.083% neb solution 30 vial 0 5/6/2018  No   Sig: Take 1 vial (2.5 mg) by nebulization every 6 hours as needed for shortness of breath / dyspnea or wheezing   Class: E-Prescribe   Route: Nebulization     Christina Watters RN/[facility]

## 2018-05-14 ENCOUNTER — OFFICE VISIT (OUTPATIENT)
Dept: PODIATRY | Facility: CLINIC | Age: 38
End: 2018-05-14
Attending: NURSE PRACTITIONER
Payer: COMMERCIAL

## 2018-05-14 VITALS
SYSTOLIC BLOOD PRESSURE: 102 MMHG | BODY MASS INDEX: 47.09 KG/M2 | DIASTOLIC BLOOD PRESSURE: 58 MMHG | HEIGHT: 66 IN | WEIGHT: 293 LBS

## 2018-05-14 DIAGNOSIS — M79.672 PAIN IN BOTH FEET: Primary | ICD-10-CM

## 2018-05-14 DIAGNOSIS — M76.62 ACHILLES TENDONITIS, BILATERAL: ICD-10-CM

## 2018-05-14 DIAGNOSIS — M72.2 PLANTAR FASCIITIS, BILATERAL: ICD-10-CM

## 2018-05-14 DIAGNOSIS — M21.42 PES PLANUS OF BOTH FEET: ICD-10-CM

## 2018-05-14 DIAGNOSIS — M79.671 PAIN IN BOTH FEET: Primary | ICD-10-CM

## 2018-05-14 DIAGNOSIS — M21.41 PES PLANUS OF BOTH FEET: ICD-10-CM

## 2018-05-14 DIAGNOSIS — M76.61 ACHILLES TENDONITIS, BILATERAL: ICD-10-CM

## 2018-05-14 PROCEDURE — 99203 OFFICE O/P NEW LOW 30 MIN: CPT | Performed by: PODIATRIST

## 2018-05-14 RX ORDER — DEXAMETHASONE SODIUM PHOSPHATE 4 MG/ML
4 INJECTION, SOLUTION INTRA-ARTICULAR; INTRALESIONAL; INTRAMUSCULAR; INTRAVENOUS; SOFT TISSUE ONCE
Qty: 30 ML | Refills: 0 | Status: SHIPPED | OUTPATIENT
Start: 2018-05-14 | End: 2018-11-19

## 2018-05-14 NOTE — MR AVS SNAPSHOT
After Visit Summary   5/14/2018    Gabi Roblero    MRN: [id number]           Patient Information     Date Of Birth          1980        Visit Information        Provider Department      5/14/2018 9:30 AM Ruma Alcocer DPM, Podiatry/Foot and Ankle Surgery; [employer] [facility]        Today's Diagnoses     Pain in both feet    -  1    Pes planus of both feet        Plantar fasciitis, bilateral        Achilles tendonitis, bilateral          Care Instructions      Thank you for choosing [facility]!    DR. ALCOCER'S CLINIC SCHEDULE  MONDAY AM - [place] TUESDAY - [place]   [address] [address] [address] [place] [address] [place] [address]   [phone] / [phone] / [phone]       WEDNESDAY - [place] FRIDAY AM - [facility]   [address]   [place] [address] [place] [address]   [phone] / [phone]       FRIDAY PM - [place] SCHEDULE SURGERY: [phone]   [address] BILLING QUESTIONS: [phone]   [place] [address] AFTER HOURS: [phone]   [phone] / [phone] APPOINTMENTS: [phone]     TENDONITIS   Tendons are the strong fibrous portions ofmuscles that attach to bones and allow the muscle to move a joint when it contracts. Tendons are very strong because they have a lot of force exerted on them. Sometimes tendons can become painful because they have suffered an acute injury, in which too much force was exerted at one time, or an overuse injury, in which a normal force was exerted too frequently or over a prolonged period of time. As a result, there is damage to the tendon and its surrounding soft tissue structures and they become inflammed. Because tendons do not have a great blood supply, they do not heal rapidly and the inflammation can become chronic.   Conservative treatment for tendinitis  involves rest and anti-inflammatory measures. Ice is applied 15 minutes 2-3 times daily. Anti-inflammatory medications called NSAIDs (ibuprofen, example) can be taken provided they are used with caution, as they can lead to internal bleeding and increase the risk ofstroke and heart attack. Sometimes topical nitroglycerin is prescribed to help with pain. Often your doctor will use a special shoe or removable walking cast to immobilize the tendon, allowing it to heal without further damage from use. These devices are very useful in helping tendons heal, but they may slow you down or make you feel like your hip, knee, or back are out ofalignment. This is temporary and should go away once you are out ofthe immobilization. You should not use a walking cast when showering or driving. Another option is Platelet Rich Plasma injections. (Normally done with a Sports and Orthorapedic doctor.   If conservative measures fail, your physician may need to surgically repair the tendon by removing any chronic inflammatory tissue and sewing it back together. Sometimes it is sewn to an adjacent tendon with similar function for support and sometimes it is lengthened. . Sometimes the bones around the tendon need to be realigned or reshaped to better support the tendon or prevent further damage. Your foot and ankle surgeon will discuss the specifics of your surgery with you, should you need it.    Towel stretch: Sit on a hard surface with your injured leg stretched out in front of you. Loop a towel around your toes and the ball of your foot and pull the towel toward your body keeping your leg straight. Hold this position for 15 to 30 seconds and then relax. Repeat 3 times. Then push the towel away with the ball of your foot. Repeat 3 times.  When you don't feel much of a stretch using the towel, you can start the standing calf stretch and the following exercises.  Standing calf stretch: Stand facing a wall with your hands on the wall at  about eye level. Keep your injured leg back with your heel on the floor. Keep the other leg forward with the knee bent. Turn your back foot slightly inward (as if you were pigeon-toed). Slowly lean into the wall until you feel a stretch in the back of your calf. Hold the stretch for 15 to 30 seconds. Return to the starting position. Repeat 3 times. Do this exercise several times each day.   Standing soleus stretch: Stand facing a wall with your hands on the wall at about chest height. Keep your injured leg back with your heel on the floor. Keep the other leg forward with the knee bent. Turn your back foot slightly inward (as if you were pigeon-toed). Bend your back knee slightly and gently lean into the wall until you feel a stretch in the lower calf of your injured leg. Hold the stretch for 15 to 30 seconds. Return to the starting position. Repeat 3 times.   Achilles stretch: Stand with the ball of one foot on a stair. Reach for the step below with your heel until you feel a stretch in the arch of your foot. Hold this position for 15 to 30 seconds and then relax. Repeat 3 times.   Heel raise: Balance yourself while standing behind a chair or counter. Using the chair or counter as a support to help you, raise your body up onto your toes and hold for 5 seconds. Then slowly lower yourself down without holding onto the support. (It's OK to keep holding onto the support if you need to.) When this exercise becomes less painful, try lowering yourself down on the injured leg only. Repeat 15 times. Do 2 sets of 15. Rest 30 seconds between sets.   Step-up: Stand with the foot of your injured leg on a support 3 to 5 inches high (like a small step or block of wood). Keep your other foot flat on the floor. Shift your weight onto the injured leg on the support. Straighten your injured leg as the other leg comes off the floor. Return to the starting position by bending your injured leg and slowly lowering your uninjured leg back  to the floor. Do 2 sets of 15.   Resisted ankle eversion: Sit with both legs stretched out in front of you, with your feet about a shoulder's width apart. Tie a loop in one end of elastic tubing. Put the foot of your injured leg through the loop so that the tubing goes around the arch of that foot and wraps around the outside of the other foot. Hold onto the other end of the tubing with your hand to provide tension. Turn the foot of your injured leg up and out. Make sure you keep your other foot still so that it will allow the tubing to stretch as you move the foot of your injured leg. Return to the starting position. Do 2 sets of 15.   Balance and reach exercises: Stand next to a chair with your injured leg farther from the chair. The chair will provide support if you need it. Stand on the foot of your injured leg and bend your knee slightly. Try to raise the arch of this foot while keeping your big toe on the floor. Keep your foot in this position. With the hand that is farther away from the chair, reach forward in front of you by bending at the waist. Avoid bending your knee any more as you do this. Repeat this 10 times. To make the exercise more challenging, reach farther in front of you. Do 2 sets of 10.  the same position as above. While keeping your arch height, reach the hand that is farther away from the chair across your body toward the chair. The farther you reach, the more challenging the exercise. Do 2 sets of 10.     Resisted ankle eversion: Sit with both legs stretched out in front of you, with your feet about a shoulder's width apart. Tie a loop in one end of elastic tubing. Put the foot of your injured leg through the loop so that the tubing goes around the arch of that foot and wraps around the outside of the other foot. Hold onto the other end of the tubing with your hand to provide tension. Turn the foot of your injured leg up and out. Make sure you keep your other foot still so that it  will allow the tubing to stretch as you move the foot of your injured leg. Return to the starting position. Do 2 sets of 15.   If you have access to a wobble board, do the following exercises:  Wobble board exercises:   Stand on a wobble board with your feet shoulder width apart. Rock the board forwards and backwards 30 times, then side to side 30 times. Hold on to a chair if you need support.   Rotate the wobble board around so that the edge of the board is in contact with the floor at all times. Do this 30 times in a clockwise and then a counterclockwise direction.   Balance on the wobble board for as long as you can without letting the edges touch the floor. Try to do this for 2 minutes without touching the floor.   Rotate the wobble board in clockwise and counterclockwise circles, but do not let the edge of the board touch the floor.   When you have mastered exercises A through D, try repeating them while standing on just your injured leg.   After you are able to do these exercises on one leg, try to do them with your eyes closed. Make sure you have something nearby to support you in case you lose your balance.  FLAT FEET   Flatfoot is often a complex disorder, with diverse symptoms and varying degrees of deformity and disability. There are several types of flatfoot, all of which have one characteristic in common: partial or total collapse (loss) of the arch.  Other characteristics shared by most types of flatfoot include:   Toe drift,  in which the toes and front part of the foot point outward   The heel tilts toward the outside and the ankle appears to turn in   A tight Achilles tendon, which causes the heel to lift off the ground earlier when walking and may make the problem worse   Bunions and hammertoes may develop as a result of a flatfoot.   Flexible Flatfoot  Flexible flatfoot is one of the most common types of flatfoot. It typically begins in childhood or adolescence and continues into adulthood. It  usually occurs in both feet and progresses in severity throughout the adult years. As the deformity worsens, the soft tissues (tendons and ligaments) of the arch may stretch or tear and can become inflamed.  The term  flexible  means that while the foot is flat when standing (weight-bearing), the arch returns when not standing.  SYMPTOMS  Pain in the heel, arch, ankle, or along the outside of the foot    Rolled-in  ankle (over-pronation)   Pain along the shin bone (shin splint)   General aching or fatigue in the foot or leg   Low back, hip or knee pain.   DIAGNOSIS  In diagnosing flatfoot, the foot and ankle surgeon examines the foot and observes how it looks when you stand and sit. X-rays are usually taken to determine the severity of the disorder. If you are diagnosed with flexible flatfoot but you don t have any symptoms, your surgeon will explain what you might expect in the future.  NON-SURGICAL TREATMENT  If you experience symptoms with flexible flatfoot, the surgeon may recommend non-surgical treatment options, including:  Activity modifications. Cut down on activities that bring you pain and avoid prolonged walking and standing to give your arches a rest.   Weight loss. If you are overweight, try to lose weight. Putting too much weight on your arches may aggravate your symptoms.   Orthotic devices. Your foot and ankle surgeon can provide you with custom orthotic devices for your shoes to give more support to the arches.   Immobilization. In some cases, it may be necessary to use a walking cast or to completely avoid weight-bearing.   Medications. Nonsteroidal anti-inflammatory drugs (NSAIDs), such as ibuprofen, help reduce pain and inflammation.   Physical therapy. Ultrasound therapy or other physical therapy modalities may be used to provide temporary relief.   Shoe modifications. Wearing shoes that support the arches is important for anyone who has flatfoot.   SURGICAL TREATMENT  In some patients whose  pain is not adequately relieved by other treatments, surgery may be considered. A variety of surgical techniques is available to correct flexible flatfoot, and one or a combination of procedures may be required to relieve the symptoms and improve foot function.  In selecting the procedure or combination of procedures for your particular case, the foot and ankle surgeon will take into consideration the extent of your deformity based on the x-ray findings, your age, your activity level, and other factors. The length of the recovery period will vary, depending on the procedure or procedures performed.      Body Mass Index (BMI)  Many things can cause foot and ankle problems. Foot structure, activity level, foot mechanics and injuries are common causes of pain.  One very important issue that often goes unmentioned, is body weight. Extra weight can cause increased stress on muscles, ligaments, bones and tendons.  Sometimes just a few extra pounds is all it takes to put one over her/his threshold. Without reducing that stress, it can be difficult to alleviate pain. Some people are uncomfortable addressing this issue, but we feel it is important for you to think about it. As Foot &  Ankle specialists, our job is addressing the lower extremity problem and possible causes. Regarding extra body weight, we encourage patients to discuss diet and weight management plans with their primary care doctors. It is this team approach that gives you the best opportunity for pain relief and getting you back on your feet.                  Follow-ups after your visit        Additional Services     [facility] PT, HAND, AND CHIROPRACTIC REFERRAL       **This order will print in the [facility] Scheduling Office**    Physical Therapy, Hand Therapy and Chiropractic Care are available through:    *[facility]  *[facility]  *[facility]    Call one number to schedule at any of the above locations: [phone].    Your provider has referred you to: Physical Therapy at [facility] or [facility]    Indication/Reason for Referral: achilles tendonitis and plantar fasciitis     Onset of Illness: year   Therapy Orders: Evaluate and Treat  Special Programs: None  Special Request: Exercise: Conditioning, stretching, posture,   Modalities: Iontophoresis (Please Order: Dexamethasone Sodium Phosphate - 4mg/ml injectable, 30 cc total volume)    Kristen Peterson      Additional Comments for the Therapist or Chiropractor:     Please be aware that coverage of these services is subject to the terms and limitations of your health insurance plan.  Call member services at your health plan with any benefit or coverage questions.      Please bring the following to your appointment:    *Your personal calendar for scheduling future appointments  *Comfortable clothing            ORTHOTICS REFERRAL       Please be aware that coverage of these services is subject to the terms and limitations of your health insurance plan.  Call member services at your health plan with any benefit or coverage questions.      Please bring the following to your appointment:    >>   Any x-rays, CTs or MRIs which have been performed.  Contact the facility where they were done to arrange for  prior to your scheduled appointment.  Any new CT, MRI or other procedures ordered by your specialist must be performed at a [place] facility or coordinated by your clinic's referral office.    >>   List of current medications   >>   This referral request   >>   Any documents/labs given to you for this referral    ==This Referral PRINTS in the [facility] (ORTHOTICS & PROSTHETICS) Central scheduling office ==     The [facility] Central Scheduling staff will contact patient to arrange appointments. Central Scheduling Phone #:  [place], [place]  [phone]     Orthotics: foot orthotics with arch support and heel pads                  Who to contact     If you have  "questions or need follow up information about today's clinic visit or your schedule please contact [facility] directly at [phone].  Normal or non-critical lab and imaging results will be communicated to you by MyChart, letter or phone within 4 business days after the clinic has received the results. If you do not hear from us within 7 days, please contact the clinic through [employer] or phone. If you have a critical or abnormal lab result, we will notify you by phone as soon as possible.  Submit refill requests through [employer] or call your pharmacy and they will forward the refill request to us. Please allow 3 business days for your refill to be completed.          Additional Information About Your Visit        [employer] Information     [email or web address] lets you send messages to your doctor, view your test results, renew your prescriptions, schedule appointments and more. To sign up, go to [email or web address] . Click on \"Log in\" on the left side of the screen, which will take you to the Welcome page. Then click on \"Sign up Now\" on the right side of the page.     You will be asked to enter the access code listed below, as well as some personal information. Please follow the directions to create your username and password.     Your access code is: [email or web address]  Expires: 2018  5:00 PM     Your access code will  in 90 days. If you need help or a new code, please call your [place] clinic or [phone].        Care EveryWhere ID     This is your Care EveryWhere ID. This could be used by other organizations to access your [place] medical records  [id number]        Your Vitals Were     Height BMI (Body Mass Index)                5' 6\" (1.676 m) 50.2 kg/m2           Blood Pressure from Last 3 Encounters:   18 102/58   18 90/60   12/15/17 108/68    Weight from Last 3 Encounters:   18 311 lb (141.1 kg)   18 311 lb 8 oz (141.3 kg)   12/15/17 (!) 306 lb (138.8 kg)              We " Performed the Following     [facility] PT, HAND, AND CHIROPRACTIC REFERRAL     ORTHOTICS REFERRAL          Today's Medication Changes          These changes are accurate as of 5/14/18 10:19 AM.  If you have any questions, ask your nurse or doctor.               Start taking these medicines.        Dose/Directions    dexamethasone 4 MG/ML injection   Commonly known as:  DECADRON   Used for:  Pain in both feet, Pes planus of both feet, Plantar fasciitis, bilateral, Achilles tendonitis, bilateral   Started by:  Ruma Alcocer, MOON, Podiatry/Foot and Ankle Surgery        Dose:  4 mg   Apply 1 mL (4 mg) topically once for 1 dose For physical therapy, iontophoresis   Quantity:  30 mL   Refills:  0            Where to get your medicines      These medications were sent to [facility] [place] [address]  [facility] [address], [facility] [address]     Phone:  [phone]     dexamethasone 4 MG/ML injection                Primary Care Provider Office Phone # Fax #    Dhruv Andino [phone]       [address]  [facility] [address]        Equal Access to Services     [facility] AH: Hadii aad ku hadasho Soomaali, waaxda luqadaha, qaybta kaalmada adeegyada, hayder wheeler haydelano guevara . So [facility] [phone].    ATENCIÓN: Si habla español, tiene a sims disposición servicios gratuitos de asistencia lingüística. LlKindred Hospital [facility]ima [phone].    We comply with applicable federal civil rights laws and [place] laws. We do not discriminate on the basis of race, color, national origin, age, disability, sex, sexual orientation, or gender identity.            Thank you!     Thank you for choosing [facility]  for your care. Our goal is always to provide you with excellent care. Hearing back from our patients is one way we can continue to improve our services. Please take a few minutes to complete the written survey that you may receive in the mail after  your visit with us. Thank you!             Your Updated Medication List - Protect others around you: Learn how to safely use, store and throw away your medicines at www.disposemymeds.org.          This list is accurate as of 18 10:19 AM.  Always use your most recent med list.                   Brand Name Dispense Instructions for use Diagnosis    acetaminophen 325 MG tablet    TYLENOL    100 tablet    Take 2 tablets (650 mg) by mouth every 4 hours as needed for mild pain or fever (greater than or equal to 38? C /100.4? F (oral) or 38.5? C/ 101.4? F (core).)     (spontaneous vaginal delivery)       * albuterol 108 (90 Base) MCG/ACT Inhaler    PROAIR HFA    1 Inhaler    Inhale 2 puffs into the lungs every 4 hours as needed for shortness of breath / dyspnea    Moderate persistent asthma, unspecified whether complicated       * albuterol (2.5 MG/3ML) 0.083% neb solution     30 vial    Take 1 vial (2.5 mg) by nebulization every 6 hours as needed for shortness of breath / dyspnea or wheezing    Moderate persistent asthma without complication       dexamethasone 4 MG/ML injection    DECADRON    30 mL    Apply 1 mL (4 mg) topically once for 1 dose For physical therapy, iontophoresis    Pain in both feet, Pes planus of both feet, Plantar fasciitis, bilateral, Achilles tendonitis, bilateral       docusate sodium 100 MG tablet    COLACE    60 tablet    Take 100 mg by mouth daily    Constipation, unspecified constipation type       ferrous sulfate 325 (65 Fe) MG tablet    IRON    90 tablet    Take 1 tablet (325 mg) by mouth daily (with breakfast)    Iron deficiency anemia, unspecified iron deficiency anemia type       fluticasone furoate 200 MCG/ACT inhalation powder    ARNUITY ELLIPTA    1 Inhaler    Inhale 1 puff into the lungs daily    Moderate persistent asthma without complication       ibuprofen 400 MG tablet    ADVIL/MOTRIN    120 tablet    Take 2 tablets (800 mg) by mouth every 6 hours as needed for other  (cramping)     (spontaneous vaginal delivery)       levothyroxine 100 MCG tablet    SYNTHROID/LEVOTHROID    90 tablet    Take 1 tablet (100 mcg) by mouth daily    Other specified hypothyroidism       naproxen 500 MG tablet    NAPROSYN    60 tablet    Take 1 tablet (500 mg) by mouth 2 times daily as needed for moderate pain    Foot pain, bilateral       omeprazole 40 MG capsule    priLOSEC    90 capsule    Take 1 capsule (40 mg) by mouth daily    Gastroesophageal reflux disease without esophagitis       order for DME     1 Device    Equipment being ordered: Nebulizer    Moderate persistent asthma without complication       polyethylene glycol powder    MIRALAX    510 g    Take 17 g (1 capful) by mouth daily    Constipation, unspecified constipation type       prenatal multivitamin plus iron 27-0.8 MG Tabs per tablet     100 tablet    Take 1 tablet by mouth daily    Supervision of elderly multigravida, second trimester, Irregular periods       vitamin D 2000 units tablet     100 tablet    Take 2,000 Units by mouth daily    Iron deficiency anemia, unspecified iron deficiency anemia type       * Notice:  This list has 2 medication(s) that are the same as other medications prescribed for you. Read the directions carefully, and ask your doctor or other care provider to review them with you.

## 2018-05-14 NOTE — PROGRESS NOTES
PATIENT HISTORY:  Gabi Roblero is a 38 year old female who presents to clinic for pain to both feet. Has been going on for 2 years. Started gradually and has been getting worse. Sore when she wakes up and after being on the foot for a while. Can be 9/10 at its worst. Here with . Did have xrays taken. Would like to know what is causing the pain and what can be done for it.     Review of Systems:  Patient denies fever, chills, rash, wound, stiffness,numbness, weakness, heart burn, blood in stool, chest pain with activity, calf pain when walking, shortness of breath with activity, chronic cough, easy bleeding/bruising, swelling of ankles, excessive thirst, fatigue, depression, anxiety.  Patient admits to limping at times.     PAST MEDICAL HISTORY:   Past Medical History:   Diagnosis Date     GERD (gastroesophageal reflux disease)      Moderate persistent asthma 4/28/2016     Thyroid disease     hypothyroid        PAST SURGICAL HISTORY:   Past Surgical History:   Procedure Laterality Date     CHOLECYSTECTOMY  2003        MEDICATIONS:   Current Outpatient Prescriptions:      acetaminophen (TYLENOL) 325 MG tablet, Take 2 tablets (650 mg) by mouth every 4 hours as needed for mild pain or fever (greater than or equal to 38  C /100.4  F (oral) or 38.5  C/ 101.4  F (core).), Disp: 100 tablet, Rfl: 1     albuterol (2.5 MG/3ML) 0.083% neb solution, Take 1 vial (2.5 mg) by nebulization every 6 hours as needed for shortness of breath / dyspnea or wheezing, Disp: 30 vial, Rfl: 0     albuterol (PROAIR HFA) 108 (90 Base) MCG/ACT Inhaler, Inhale 2 puffs into the lungs every 4 hours as needed for shortness of breath / dyspnea, Disp: 1 Inhaler, Rfl: 0     Cholecalciferol (VITAMIN D) 2000 UNITS tablet, Take 2,000 Units by mouth daily, Disp: 100 tablet, Rfl: 3     docusate sodium (COLACE) 100 MG tablet, Take 100 mg by mouth daily, Disp: 60 tablet, Rfl: 1     ferrous sulfate (IRON) 325 (65 Fe) MG tablet, Take 1 tablet  (325 mg) by mouth daily (with breakfast), Disp: 90 tablet, Rfl: 1     fluticasone furoate (ARNUITY ELLIPTA) 200 MCG/ACT inhalation powder, Inhale 1 puff into the lungs daily, Disp: 1 Inhaler, Rfl: 3     ibuprofen (ADVIL/MOTRIN) 400 MG tablet, Take 2 tablets (800 mg) by mouth every 6 hours as needed for other (cramping), Disp: 120 tablet, Rfl: 0     levothyroxine (SYNTHROID/LEVOTHROID) 100 MCG tablet, Take 1 tablet (100 mcg) by mouth daily, Disp: 90 tablet, Rfl: 3     naproxen (NAPROSYN) 500 MG tablet, Take 1 tablet (500 mg) by mouth 2 times daily as needed for moderate pain, Disp: 60 tablet, Rfl: 0     omeprazole (PRILOSEC) 40 MG capsule, Take 1 capsule (40 mg) by mouth daily, Disp: 90 capsule, Rfl: 3     order for DME, Equipment being ordered: Nebulizer, Disp: 1 Device, Rfl: 0     polyethylene glycol (MIRALAX) powder, Take 17 g (1 capful) by mouth daily, Disp: 510 g, Rfl: 11     Prenatal Vit-Fe Fumarate-FA (PRENATAL MULTIVITAMIN PLUS IRON) 27-0.8 MG TABS per tablet, Take 1 tablet by mouth daily, Disp: 100 tablet, Rfl: 3     ALLERGIES:  No Known Allergies     SOCIAL HISTORY:   Social History     Social History     Marital status:      Spouse name: N/A     Number of children: N/A     Years of education: N/A     Occupational History     Not on file.     Social History Main Topics     Smoking status: Never Smoker     Smokeless tobacco: Never Used     Alcohol use No     Drug use: No     Sexual activity: Yes     Partners: Male     Other Topics Concern      Service No     Blood Transfusions No     Caffeine Concern No     Occupational Exposure No     Hobby Hazards No     Sleep Concern No     Stress Concern No     Weight Concern Yes     Special Diet No     Back Care No     Exercise No     Bike Helmet No     Seat Belt Yes     Self-Exams Yes     Social History Narrative        FAMILY HISTORY:   Family History   Problem Relation Age of Onset     DIABETES Father      Family History Negative Mother      CANCER No  "family hx of      CEREBROVASCULAR DISEASE No family hx of      Hypertension No family hx of      C.A.D. No family hx of      Asthma No family hx of         EXAM:Vitals: /58  Ht 5' 6\" (1.676 m)  Wt 311 lb (141.1 kg)  BMI 50.2 kg/m2  BMI= Body mass index is 50.2 kg/(m^2).    General appearance: Patient is alert and fully cooperative with history & exam.  No sign of distress is noted during the visit.     Psychiatric: Affect is pleasant & appropriate.  Patient appears motivated to improve health.     Respiratory: Breathing is regular & unlabored while sitting.     HEENT: Hearing is intact to spoken word.  Speech is clear.  No gross evidence of visual impairment that would impact ambulation.     Dermatologic: Skin is intact to both lower extremities without significant lesions, rash or abrasion.  No paronychia or evidence of soft tissue infection is noted.     Vascular: DP & PT pulses are intact & regular bilaterally.  No significant edema or varicosities noted.  CFT and skin temperature is normal to both lower extremities.     Neurologic: Lower extremity sensation is intact to light touch.  No evidence of weakness or contracture in the lower extremities.  No evidence of neuropathy.     Musculoskeletal: Patient is ambulatory without assistive device or brace. Decrease arch height. Pain on palpation of the plantar heels, posterior heels and posterior tibial tendons. Pain with inversion and eversion.      ASSESSMENT:    Pain in both feet  Pes planus of both feet  Plantar fasciitis, bilateral  Achilles tendonitis, bilateral     PLAN:  Reviewed [facility] in [facility]. The potential causes and nature of plantar fasciitis were discussed with the patient.  We reviewed the natural history/prognosis of the condition and risks if left untreated.  These include chronic pain, other sites of pain due to gait changes, and potential plantar fascial rupture.      We discussed possible causes of the condition as it relates to " the patients specific situation.      Conservative treatment options were reviewed:  appropriate shoes, avoidance of barefoot walking, inserts/orthoses, stretching, ice, massage, immobilization and NSAIDs.     We also reviewed the options of injection therapy and surgery.  However, it was made clear that surgery is only considered when conservative therapy fails.  The risks and benefits of injection therapy, and surgery were discussed.    Reviewed and discussed causes of tendonitis.  We discussed treatments such as immobiliation, icing, stretching, heel lifts, orthotics, physical therapy, MRI.     At this time, recommend arch supports, PT with ionto, icing, stretching. Also discussed possible boot if not improving or MRI.          Ruma Alcocer DPM, Podiatry/Foot and Ankle Surgery    Weight management plan: Patient was referred to their PCP to discuss a diet and exercise plan.    Patient to follow up with Primary Care provider regarding elevated blood pressure.

## 2018-05-14 NOTE — LETTER
5/14/2018         RE: Gabi Roblero  [id number] [address] [id number]        Dear Colleague,    Thank you for referring your patient, Gabi Roblero, to the [facility]. Please see a copy of my visit note below.    PATIENT HISTORY:  Gabi Roblero is a 38 year old female who presents to clinic for pain to both feet. Has been going on for 2 years. Started gradually and has been getting worse. Sore when she wakes up and after being on the foot for a while. Can be 9/10 at its worst. Here with . Did have xrays taken. Would like to know what is causing the pain and what can be done for it.     Review of Systems:  Patient denies fever, chills, rash, wound, stiffness,numbness, weakness, heart burn, blood in stool, chest pain with activity, calf pain when walking, shortness of breath with activity, chronic cough, easy bleeding/bruising, swelling of ankles, excessive thirst, fatigue, depression, anxiety.  Patient admits to limping at times.     PAST MEDICAL HISTORY:   Past Medical History:   Diagnosis Date     GERD (gastroesophageal reflux disease)      Moderate persistent asthma 4/28/2016     Thyroid disease     hypothyroid        PAST SURGICAL HISTORY:   Past Surgical History:   Procedure Laterality Date     CHOLECYSTECTOMY  2003        MEDICATIONS:   Current Outpatient Prescriptions:      acetaminophen (TYLENOL) 325 MG tablet, Take 2 tablets (650 mg) by mouth every 4 hours as needed for mild pain or fever (greater than or equal to 38  C /100.4  F (oral) or 38.5  C/ 101.4  F (core).), Disp: 100 tablet, Rfl: 1     albuterol (2.5 MG/3ML) 0.083% neb solution, Take 1 vial (2.5 mg) by nebulization every 6 hours as needed for shortness of breath / dyspnea or wheezing, Disp: 30 vial, Rfl: 0     albuterol (PROAIR HFA) 108 (90 Base) MCG/ACT Inhaler, Inhale 2 puffs into the lungs every 4 hours as needed for shortness of breath / dyspnea, Disp: 1 Inhaler, Rfl: 0     Cholecalciferol  (VITAMIN D) 2000 UNITS tablet, Take 2,000 Units by mouth daily, Disp: 100 tablet, Rfl: 3     docusate sodium (COLACE) 100 MG tablet, Take 100 mg by mouth daily, Disp: 60 tablet, Rfl: 1     ferrous sulfate (IRON) 325 (65 Fe) MG tablet, Take 1 tablet (325 mg) by mouth daily (with breakfast), Disp: 90 tablet, Rfl: 1     fluticasone furoate (ARNUITY ELLIPTA) 200 MCG/ACT inhalation powder, Inhale 1 puff into the lungs daily, Disp: 1 Inhaler, Rfl: 3     ibuprofen (ADVIL/MOTRIN) 400 MG tablet, Take 2 tablets (800 mg) by mouth every 6 hours as needed for other (cramping), Disp: 120 tablet, Rfl: 0     levothyroxine (SYNTHROID/LEVOTHROID) 100 MCG tablet, Take 1 tablet (100 mcg) by mouth daily, Disp: 90 tablet, Rfl: 3     naproxen (NAPROSYN) 500 MG tablet, Take 1 tablet (500 mg) by mouth 2 times daily as needed for moderate pain, Disp: 60 tablet, Rfl: 0     omeprazole (PRILOSEC) 40 MG capsule, Take 1 capsule (40 mg) by mouth daily, Disp: 90 capsule, Rfl: 3     order for DME, Equipment being ordered: Nebulizer, Disp: 1 Device, Rfl: 0     polyethylene glycol (MIRALAX) powder, Take 17 g (1 capful) by mouth daily, Disp: 510 g, Rfl: 11     Prenatal Vit-Fe Fumarate-FA (PRENATAL MULTIVITAMIN PLUS IRON) 27-0.8 MG TABS per tablet, Take 1 tablet by mouth daily, Disp: 100 tablet, Rfl: 3     ALLERGIES:  No Known Allergies     SOCIAL HISTORY:   Social History     Social History     Marital status:      Spouse name: N/A     Number of children: N/A     Years of education: N/A     Occupational History     Not on file.     Social History Main Topics     Smoking status: Never Smoker     Smokeless tobacco: Never Used     Alcohol use No     Drug use: No     Sexual activity: Yes     Partners: Male     Other Topics Concern      Service No     Blood Transfusions No     Caffeine Concern No     Occupational Exposure No     Hobby Hazards No     Sleep Concern No     Stress Concern No     Weight Concern Yes     Special Diet No     Back  "Care No     Exercise No     Bike Helmet No     Seat Belt Yes     Self-Exams Yes     Social History Narrative        FAMILY HISTORY:   Family History   Problem Relation Age of Onset     DIABETES Father      Family History Negative Mother      CANCER No family hx of      CEREBROVASCULAR DISEASE No family hx of      Hypertension No family hx of      C.A.D. No family hx of      Asthma No family hx of         EXAM:Vitals: /58  Ht 5' 6\" (1.676 m)  Wt 311 lb (141.1 kg)  BMI 50.2 kg/m2  BMI= Body mass index is 50.2 kg/(m^2).    General appearance: Patient is alert and fully cooperative with history & exam.  No sign of distress is noted during the visit.     Psychiatric: Affect is pleasant & appropriate.  Patient appears motivated to improve health.     Respiratory: Breathing is regular & unlabored while sitting.     HEENT: Hearing is intact to spoken word.  Speech is clear.  No gross evidence of visual impairment that would impact ambulation.     Dermatologic: Skin is intact to both lower extremities without significant lesions, rash or abrasion.  No paronychia or evidence of soft tissue infection is noted.     Vascular: DP & PT pulses are intact & regular bilaterally.  No significant edema or varicosities noted.  CFT and skin temperature is normal to both lower extremities.     Neurologic: Lower extremity sensation is intact to light touch.  No evidence of weakness or contracture in the lower extremities.  No evidence of neuropathy.     Musculoskeletal: Patient is ambulatory without assistive device or brace. Decrease arch height. Pain on palpation of the plantar heels, posterior heels and posterior tibial tendons. Pain with inversion and eversion.      ASSESSMENT:    Pain in both feet  Pes planus of both feet  Plantar fasciitis, bilateral  Achilles tendonitis, bilateral     PLAN:  Reviewed patient's chart in [facility]. The potential causes and nature of plantar fasciitis were discussed with the patient.  We reviewed " the natural history/prognosis of the condition and risks if left untreated.  These include chronic pain, other sites of pain due to gait changes, and potential plantar fascial rupture.      We discussed possible causes of the condition as it relates to the patients specific situation.      Conservative treatment options were reviewed:  appropriate shoes, avoidance of barefoot walking, inserts/orthoses, stretching, ice, massage, immobilization and NSAIDs.     We also reviewed the options of injection therapy and surgery.  However, it was made clear that surgery is only considered when conservative therapy fails.  The risks and benefits of injection therapy, and surgery were discussed.    Reviewed and discussed causes of tendonitis.  We discussed treatments such as immobiliation, icing, stretching, heel lifts, orthotics, physical therapy, MRI.     At this time, recommend arch supports, PT with ionto, icing, stretching. Also discussed possible boot if not improving or MRI.          Ruma Alcocer DPM, Podiatry/Foot and Ankle Surgery    Weight management plan: Patient was referred to their PCP to discuss a diet and exercise plan.    Patient to follow up with Primary Care provider regarding elevated blood pressure.        Again, thank you for allowing me to participate in the care of your patient.        Sincerely,        Ruma Alcocer DPM, Podiatry/Foot and Ankle Surgery

## 2018-05-14 NOTE — PATIENT INSTRUCTIONS
Thank you for choosing [facility]!    DR. BLAKE'S CLINIC SCHEDULE  MONDAY AM - [place] TUESDAY - [place]   [address] [address] [address] [address] [place] [address]   [phone] / [phone] / [phone]       WEDNESDAY - [place] FRIDAY AM - [facility]   [address]   [place] [address] [place] [address]   [phone] / [phone]       FRIDAY PM - [place] SCHEDULE SURGERY: [phone]   [address] BILLING QUESTIONS: [phone]   [place] [address] AFTER HOURS: [phone]   [phone] / [phone] APPOINTMENTS: [phone]     TENDONITIS   Tendons are the strong fibrous portions ofmuscles that attach to bones and allow the muscle to move a joint when it contracts. Tendons are very strong because they have a lot of force exerted on them. Sometimes tendons can become painful because they have suffered an acute injury, in which too much force was exerted at one time, or an overuse injury, in which a normal force was exerted too frequently or over a prolonged period of time. As a result, there is damage to the tendon and its surrounding soft tissue structures and they become inflammed. Because tendons do not have a great blood supply, they do not heal rapidly and the inflammation can become chronic.   Conservative treatment for tendinitis involves rest and anti-inflammatory measures. Ice is applied 15 minutes 2-3 times daily. Anti-inflammatory medications called NSAIDs (ibuprofen, example) can be taken provided they are used with caution, as they can lead to internal bleeding and increase the risk ofstroke and heart attack. Sometimes topical nitroglycerin is prescribed to help with pain. Often your doctor will use a special shoe or removable walking cast to immobilize the tendon, allowing it to heal without further damage from use. These devices are very  useful in helping tendons heal, but they may slow you down or make you feel like your hip, knee, or back are out ofalignment. This is temporary and should go away once you are out ofthe immobilization. You should not use a walking cast when showering or driving. Another option is Platelet Rich Plasma injections. (Normally done with a Sports and Orthorapedic doctor.   If conservative measures fail, your physician may need to surgically repair the tendon by removing any chronic inflammatory tissue and sewing it back together. Sometimes it is sewn to an adjacent tendon with similar function for support and sometimes it is lengthened. . Sometimes the bones around the tendon need to be realigned or reshaped to better support the tendon or prevent further damage. Your foot and ankle surgeon will discuss the specifics of your surgery with you, should you need it.    Towel stretch: Sit on a hard surface with your injured leg stretched out in front of you. Loop a towel around your toes and the ball of your foot and pull the towel toward your body keeping your leg straight. Hold this position for 15 to 30 seconds and then relax. Repeat 3 times. Then push the towel away with the ball of your foot. Repeat 3 times.  When you don't feel much of a stretch using the towel, you can start the standing calf stretch and the following exercises.  Standing calf stretch: Stand facing a wall with your hands on the wall at about eye level. Keep your injured leg back with your heel on the floor. Keep the other leg forward with the knee bent. Turn your back foot slightly inward (as if you were pigeon-toed). Slowly lean into the wall until you feel a stretch in the back of your calf. Hold the stretch for 15 to 30 seconds. Return to the starting position. Repeat 3 times. Do this exercise several times each day.   Standing soleus stretch: Stand facing a wall with your hands on the wall at about chest height. Keep your injured leg back with your  heel on the floor. Keep the other leg forward with the knee bent. Turn your back foot slightly inward (as if you were pigeon-toed). Bend your back knee slightly and gently lean into the wall until you feel a stretch in the lower calf of your injured leg. Hold the stretch for 15 to 30 seconds. Return to the starting position. Repeat 3 times.   Achilles stretch: Stand with the ball of one foot on a stair. Reach for the step below with your heel until you feel a stretch in the arch of your foot. Hold this position for 15 to 30 seconds and then relax. Repeat 3 times.   Heel raise: Balance yourself while standing behind a chair or counter. Using the chair or counter as a support to help you, raise your body up onto your toes and hold for 5 seconds. Then slowly lower yourself down without holding onto the support. (It's OK to keep holding onto the support if you need to.) When this exercise becomes less painful, try lowering yourself down on the injured leg only. Repeat 15 times. Do 2 sets of 15. Rest 30 seconds between sets.   Step-up: Stand with the foot of your injured leg on a support 3 to 5 inches high (like a small step or block of wood). Keep your other foot flat on the floor. Shift your weight onto the injured leg on the support. Straighten your injured leg as the other leg comes off the floor. Return to the starting position by bending your injured leg and slowly lowering your uninjured leg back to the floor. Do 2 sets of 15.   Resisted ankle eversion: Sit with both legs stretched out in front of you, with your feet about a shoulder's width apart. Tie a loop in one end of elastic tubing. Put the foot of your injured leg through the loop so that the tubing goes around the arch of that foot and wraps around the outside of the other foot. Hold onto the other end of the tubing with your hand to provide tension. Turn the foot of your injured leg up and out. Make sure you keep your other foot still so that it will  allow the tubing to stretch as you move the foot of your injured leg. Return to the starting position. Do 2 sets of 15.   Balance and reach exercises: Stand next to a chair with your injured leg farther from the chair. The chair will provide support if you need it. Stand on the foot of your injured leg and bend your knee slightly. Try to raise the arch of this foot while keeping your big toe on the floor. Keep your foot in this position. With the hand that is farther away from the chair, reach forward in front of you by bending at the waist. Avoid bending your knee any more as you do this. Repeat this 10 times. To make the exercise more challenging, reach farther in front of you. Do 2 sets of 10.  the same position as above. While keeping your arch height, reach the hand that is farther away from the chair across your body toward the chair. The farther you reach, the more challenging the exercise. Do 2 sets of 10.     Resisted ankle eversion: Sit with both legs stretched out in front of you, with your feet about a shoulder's width apart. Tie a loop in one end of elastic tubing. Put the foot of your injured leg through the loop so that the tubing goes around the arch of that foot and wraps around the outside of the other foot. Hold onto the other end of the tubing with your hand to provide tension. Turn the foot of your injured leg up and out. Make sure you keep your other foot still so that it will allow the tubing to stretch as you move the foot of your injured leg. Return to the starting position. Do 2 sets of 15.   If you have access to a wobble board, do the following exercises:  Wobble board exercises:   Stand on a wobble board with your feet shoulder width apart. Rock the board forwards and backwards 30 times, then side to side 30 times. Hold on to a chair if you need support.   Rotate the wobble board around so that the edge of the board is in contact with the floor at all times. Do this 30 times in a  clockwise and then a counterclockwise direction.   Balance on the wobble board for as long as you can without letting the edges touch the floor. Try to do this for 2 minutes without touching the floor.   Rotate the wobble board in clockwise and counterclockwise circles, but do not let the edge of the board touch the floor.   When you have mastered exercises A through D, try repeating them while standing on just your injured leg.   After you are able to do these exercises on one leg, try to do them with your eyes closed. Make sure you have something nearby to support you in case you lose your balance.  FLAT FEET   Flatfoot is often a complex disorder, with diverse symptoms and varying degrees of deformity and disability. There are several types of flatfoot, all of which have one characteristic in common: partial or total collapse (loss) of the arch.  Other characteristics shared by most types of flatfoot include:   Toe drift,  in which the toes and front part of the foot point outward   The heel tilts toward the outside and the ankle appears to turn in   A tight Achilles tendon, which causes the heel to lift off the ground earlier when walking and may make the problem worse   Bunions and hammertoes may develop as a result of a flatfoot.   Flexible Flatfoot  Flexible flatfoot is one of the most common types of flatfoot. It typically begins in childhood or adolescence and continues into adulthood. It usually occurs in both feet and progresses in severity throughout the adult years. As the deformity worsens, the soft tissues (tendons and ligaments) of the arch may stretch or tear and can become inflamed.  The term  flexible  means that while the foot is flat when standing (weight-bearing), the arch returns when not standing.  SYMPTOMS  Pain in the heel, arch, ankle, or along the outside of the foot    Rolled-in  ankle (over-pronation)   Pain along the shin bone (shin splint)   General aching or fatigue in the foot or  leg   Low back, hip or knee pain.   DIAGNOSIS  In diagnosing flatfoot, the foot and ankle surgeon examines the foot and observes how it looks when you stand and sit. X-rays are usually taken to determine the severity of the disorder. If you are diagnosed with flexible flatfoot but you don t have any symptoms, your surgeon will explain what you might expect in the future.  NON-SURGICAL TREATMENT  If you experience symptoms with flexible flatfoot, the surgeon may recommend non-surgical treatment options, including:  Activity modifications. Cut down on activities that bring you pain and avoid prolonged walking and standing to give your arches a rest.   Weight loss. If you are overweight, try to lose weight. Putting too much weight on your arches may aggravate your symptoms.   Orthotic devices. Your foot and ankle surgeon can provide you with custom orthotic devices for your shoes to give more support to the arches.   Immobilization. In some cases, it may be necessary to use a walking cast or to completely avoid weight-bearing.   Medications. Nonsteroidal anti-inflammatory drugs (NSAIDs), such as ibuprofen, help reduce pain and inflammation.   Physical therapy. Ultrasound therapy or other physical therapy modalities may be used to provide temporary relief.   Shoe modifications. Wearing shoes that support the arches is important for anyone who has flatfoot.   SURGICAL TREATMENT  In some patients whose pain is not adequately relieved by other treatments, surgery may be considered. A variety of surgical techniques is available to correct flexible flatfoot, and one or a combination of procedures may be required to relieve the symptoms and improve foot function.  In selecting the procedure or combination of procedures for your particular case, the foot and ankle surgeon will take into consideration the extent of your deformity based on the x-ray findings, your age, your activity level, and other factors. The length of the  recovery period will vary, depending on the procedure or procedures performed.    Home Medical Equipment:  1. [facility]    [facility] -[address]  [address]   [place] [phone]     2. [facility] - [address], [place], [phone]        Body Mass Index (BMI)  Many things can cause foot and ankle problems. Foot structure, activity level, foot mechanics and injuries are common causes of pain.  One very important issue that often goes unmentioned, is body weight. Extra weight can cause increased stress on muscles, ligaments, bones and tendons.  Sometimes just a few extra pounds is all it takes to put one over her/his threshold. Without reducing that stress, it can be difficult to alleviate pain. Some people are uncomfortable addressing this issue, but we feel it is important for you to think about it. As Foot &  Ankle specialists, our job is addressing the lower extremity problem and possible causes. Regarding extra body weight, we encourage patients to discuss diet and weight management plans with their primary care doctors. It is this team approach that gives you the best opportunity for pain relief and getting you back on your feet.

## 2018-06-07 DIAGNOSIS — K21.9 GASTROESOPHAGEAL REFLUX DISEASE WITHOUT ESOPHAGITIS: ICD-10-CM

## 2018-06-07 NOTE — TELEPHONE ENCOUNTER
Last Written Prescription Date:  9-21-16  Last Fill Quantity: 120 ,  # refills: 11  Last office visit: 5/11/2016 with prescribing provider:  5-1-18  Future Office Visit:      Thank you,  Sandrita Miranda [facility]  [phone]

## 2018-06-08 RX ORDER — OMEPRAZOLE 40 MG/1
40 CAPSULE, DELAYED RELEASE ORAL DAILY
Qty: 90 CAPSULE | Refills: 3 | OUTPATIENT
Start: 2018-06-08

## 2018-08-28 DIAGNOSIS — J45.40 MODERATE PERSISTENT ASTHMA, UNSPECIFIED WHETHER COMPLICATED: ICD-10-CM

## 2018-08-28 RX ORDER — ALBUTEROL SULFATE 90 UG/1
2 AEROSOL, METERED RESPIRATORY (INHALATION) EVERY 4 HOURS PRN
Qty: 1 INHALER | Refills: 1 | Status: SHIPPED | OUTPATIENT
Start: 2018-08-28 | End: 2020-02-26

## 2018-08-28 NOTE — TELEPHONE ENCOUNTER
Last Written Prescription Date:  05.06.2018  Last Fill Quantity: 1,  # refills: 0   Last office visit: 5/1/2018 with prescribing provider:  Leonidas    Future Office Visit:      Alia Pranke, PharmacyTechnician   [facility]

## 2018-11-19 ENCOUNTER — OFFICE VISIT (OUTPATIENT)
Dept: INTERNAL MEDICINE | Facility: CLINIC | Age: 38
End: 2018-11-19
Payer: COMMERCIAL

## 2018-11-19 VITALS
SYSTOLIC BLOOD PRESSURE: 98 MMHG | RESPIRATION RATE: 24 BRPM | OXYGEN SATURATION: 96 % | BODY MASS INDEX: 47.09 KG/M2 | HEART RATE: 87 BPM | HEIGHT: 66 IN | WEIGHT: 293 LBS | DIASTOLIC BLOOD PRESSURE: 64 MMHG | TEMPERATURE: 98.2 F

## 2018-11-19 DIAGNOSIS — E03.8 OTHER SPECIFIED HYPOTHYROIDISM: ICD-10-CM

## 2018-11-19 DIAGNOSIS — M62.08 RECTUS DIASTASIS: ICD-10-CM

## 2018-11-19 DIAGNOSIS — J45.40 MODERATE PERSISTENT ASTHMA WITHOUT COMPLICATION: ICD-10-CM

## 2018-11-19 DIAGNOSIS — Z00.00 ENCOUNTER FOR ROUTINE ADULT HEALTH EXAMINATION WITHOUT ABNORMAL FINDINGS: Primary | ICD-10-CM

## 2018-11-19 DIAGNOSIS — K21.9 GASTROESOPHAGEAL REFLUX DISEASE WITHOUT ESOPHAGITIS: ICD-10-CM

## 2018-11-19 DIAGNOSIS — E66.01 MORBID OBESITY (H): ICD-10-CM

## 2018-11-19 LAB
ERYTHROCYTE [DISTWIDTH] IN BLOOD BY AUTOMATED COUNT: 15.9 % (ref 10–15)
HCT VFR BLD AUTO: 39.8 % (ref 35–47)
HGB BLD-MCNC: 12.6 G/DL (ref 11.7–15.7)
MCH RBC QN AUTO: 26.8 PG (ref 26.5–33)
MCHC RBC AUTO-ENTMCNC: 31.7 G/DL (ref 31.5–36.5)
MCV RBC AUTO: 85 FL (ref 78–100)
PLATELET # BLD AUTO: 275 10E9/L (ref 150–450)
RBC # BLD AUTO: 4.7 10E12/L (ref 3.8–5.2)
WBC # BLD AUTO: 6.6 10E9/L (ref 4–11)

## 2018-11-19 PROCEDURE — 84439 ASSAY OF FREE THYROXINE: CPT | Performed by: INTERNAL MEDICINE

## 2018-11-19 PROCEDURE — 36415 COLL VENOUS BLD VENIPUNCTURE: CPT | Performed by: INTERNAL MEDICINE

## 2018-11-19 PROCEDURE — 99395 PREV VISIT EST AGE 18-39: CPT | Performed by: INTERNAL MEDICINE

## 2018-11-19 PROCEDURE — 84443 ASSAY THYROID STIM HORMONE: CPT | Performed by: INTERNAL MEDICINE

## 2018-11-19 PROCEDURE — 80053 COMPREHEN METABOLIC PANEL: CPT | Performed by: INTERNAL MEDICINE

## 2018-11-19 PROCEDURE — 85027 COMPLETE CBC AUTOMATED: CPT | Performed by: INTERNAL MEDICINE

## 2018-11-19 PROCEDURE — 99213 OFFICE O/P EST LOW 20 MIN: CPT | Mod: 25 | Performed by: INTERNAL MEDICINE

## 2018-11-19 PROCEDURE — 80061 LIPID PANEL: CPT | Performed by: INTERNAL MEDICINE

## 2018-11-19 RX ORDER — OMEPRAZOLE 40 MG/1
40 CAPSULE, DELAYED RELEASE ORAL DAILY
Qty: 90 CAPSULE | Refills: 3 | Status: CANCELLED | OUTPATIENT
Start: 2018-11-19

## 2018-11-19 RX ORDER — PRENATAL VIT/IRON FUM/FOLIC AC 27MG-0.8MG
1 TABLET ORAL DAILY
Qty: 100 TABLET | Refills: 3 | Status: CANCELLED | OUTPATIENT
Start: 2018-11-19

## 2018-11-19 RX ORDER — LEVOTHYROXINE SODIUM 100 UG/1
100 TABLET ORAL DAILY
Qty: 90 TABLET | Refills: 3 | Status: CANCELLED | OUTPATIENT
Start: 2018-11-19

## 2018-11-19 RX ORDER — ALBUTEROL SULFATE 90 UG/1
2 AEROSOL, METERED RESPIRATORY (INHALATION) EVERY 4 HOURS PRN
Qty: 1 INHALER | Refills: 1 | Status: CANCELLED | OUTPATIENT
Start: 2018-11-19

## 2018-11-19 ASSESSMENT — ENCOUNTER SYMPTOMS
FREQUENCY: 0
FEVER: 0
CHILLS: 0
ARTHRALGIAS: 0
HEMATOCHEZIA: 0
DIZZINESS: 0
PALPITATIONS: 0
HEARTBURN: 1
MYALGIAS: 0
DIARRHEA: 0
EYE PAIN: 0
WEAKNESS: 0
NAUSEA: 0
COUGH: 0
HEMATURIA: 0
HEADACHES: 0
JOINT SWELLING: 0
PARESTHESIAS: 0
ABDOMINAL PAIN: 0
BREAST MASS: 0
DYSURIA: 0
SORE THROAT: 0
CONSTIPATION: 1
SHORTNESS OF BREATH: 0
NERVOUS/ANXIOUS: 0

## 2018-11-19 NOTE — PROGRESS NOTES
SUBJECTIVE:   CC: Gabi Roblero is an 38 year old woman who presents for preventive health visit.     Physical   Annual:     Getting at least 3 servings of Calcium per day:  NO    Bi-annual eye exam:  Yes    Dental care twice a year:  Yes    Sleep apnea or symptoms of sleep apnea:  None    Diet:  Regular (no restrictions)    Frequency of exercise:  1 day/week    Duration of exercise:  Less than 15 minutes    Taking medications regularly:  Yes    Additional concerns today:  Yes        Hypothyroidism Follow-up      Since last visit, patient describes the following symptoms: Weight stable, no hair loss, no skin changes, no constipation, no loose stools    Morbid obesity; not on diet or exercise..     Patient complains of lump in front of her upper abd 1-2  years and has occasional pain.        Today's PHQ-2 Score:   PHQ-2 ( 1999 [employer]) 11/19/2018   Q1: Little interest or pleasure in doing things 0   Q2: Feeling down, depressed or hopeless 0   PHQ-2 Score 0   Q1: Little interest or pleasure in doing things Not at all   Q2: Feeling down, depressed or hopeless Not at all   PHQ-2 Score 0       Abuse: Current or Past(Physical, Sexual or Emotional)- No  Do you feel safe in your environment - Yes    Past Medical History:   Diagnosis Date     GERD (gastroesophageal reflux disease)      Hypothyroidism      Moderate persistent asthma 4/28/2016       Past Surgical History:   Procedure Laterality Date     CHOLECYSTECTOMY  2003       Current Outpatient Prescriptions   Medication Sig Dispense Refill     acetaminophen (TYLENOL) 325 MG tablet Take 2 tablets (650 mg) by mouth every 4 hours as needed for mild pain or fever (greater than or equal to 38  C /100.4  F (oral) or 38.5  C/ 101.4  F (core).) 100 tablet 1     albuterol (PROAIR HFA) 108 (90 Base) MCG/ACT inhaler Inhale 2 puffs into the lungs every 4 hours as needed for shortness of breath / dyspnea 1 Inhaler 1     fluticasone furoate (ARNUITY ELLIPTA) 200 MCG/ACT  inhalation powder Inhale 1 puff into the lungs daily 1 Inhaler 3     levothyroxine (SYNTHROID/LEVOTHROID) 100 MCG tablet Take 1 tablet (100 mcg) by mouth daily 90 tablet 3     omeprazole (PRILOSEC) 40 MG capsule Take 1 capsule (40 mg) by mouth daily 90 capsule 3     order for DME Equipment being ordered: Nebulizer 1 Device 0     albuterol (2.5 MG/3ML) 0.083% neb solution Take 1 vial (2.5 mg) by nebulization every 6 hours as needed for shortness of breath / dyspnea or wheezing (Patient not taking: Reported on 11/19/2018) 30 vial 0       Family History   Problem Relation Age of Onset     Diabetes Father      Family History Negative Mother      Cancer No family hx of      Cerebrovascular Disease No family hx of      Hypertension No family hx of      C.A.D. No family hx of      Asthma No family hx of        Social History   Substance Use Topics     Smoking status: Never Smoker     Smokeless tobacco: Never Used     Alcohol use No     Alcohol Use 11/19/2018   If you drink alcohol do you typically have greater than 3 drinks per day OR greater than 7 drinks per week? No   No flowsheet data found.    Reviewed orders with patient.  Reviewed health maintenance and updated orders accordingly - Yes      Mammogram not appropriate for this patient based on age.    Pertinent mammograms are reviewed under the imaging tab.  History of abnormal Pap smear: NO - age 30-65 PAP every 5 years with negative HPV co-testing recommended  PAP / HPV Latest Ref Rng & Units 10/16/2015 7/30/2012   PAP - NIL NIL   HPV 16 DNA NEG Negative -   HPV 18 DNA NEG Negative -   OTHER HR HPV NEG Negative -     Reviewed and updated as needed this visit by clinical staff         Reviewed and updated as needed this visit by Provider            Review of Systems   Constitutional: Negative for chills and fever.   HENT: Negative for congestion, hearing loss and sore throat.    Eyes: Negative for pain and visual disturbance.   Respiratory: Negative for cough and  "shortness of breath.    Cardiovascular: Negative for chest pain, palpitations and peripheral edema.   Gastrointestinal: Positive for constipation and heartburn. Negative for abdominal pain, diarrhea, hematochezia and nausea.   Breasts:  Negative for tenderness, breast mass and discharge.   Genitourinary: Negative for dysuria, frequency, genital sores, hematuria, pelvic pain, urgency, vaginal bleeding and vaginal discharge.   Musculoskeletal: Negative for arthralgias, joint swelling and myalgias.   Skin: Negative for rash.   Neurological: Negative for dizziness, weakness, headaches and paresthesias.   Psychiatric/Behavioral: Negative for mood changes. The patient is not nervous/anxious.          OBJECTIVE:   BP 98/64  Pulse 87  Temp 98.2  F (36.8  C) (Oral)  Resp 24  Ht 5' 6\" (1.676 m)  Wt 309 lb 6.4 oz (140.3 kg)  SpO2 96%  BMI 49.94 kg/m2  Physical Exam  GENERAL: healthy, alert and no distress  EYES: Eyes grossly normal to inspection, PERRL and conjunctivae and sclerae normal  HENT: ear canals and TM's normal, nose and mouth without ulcers or lesions  NECK: no adenopathy, no asymmetry, masses, or scars and thyroid normal to palpation  RESP: lungs clear to auscultation - no rales, rhonchi or wheezes  BREAST: normal without masses, tenderness or nipple discharge and no palpable axillary masses or adenopathy  CV: regular rate and rhythm, , no peripheral edema and peripheral pulses strong  ABDOMEN: midline lump noted, - soft, nontender, no hepatosplenomegaly, no masses and bowel sounds normal  MS: no gross musculoskeletal defects noted, no edema  NEURO: Normal strength and tone, mentation intact and speech normal  PSYCH: mentation appears normal, affect normal/bright      ASSESSMENT/PLAN:        (Z00.00) Encounter for routine adult health examination without abnormal findings  (primary encounter diagnosis)  Plan: CBC with platelets, Comprehensive metabolic         panel, Lipid panel reflex to direct LDL         " "Fasting,             (E66.01) Morbid obesity (H)  Plan:  Discussed in detail about Diet,calorie intake,and importance of regular exercise,pt will strive towards this .      (J45.40) Moderate persistent asthma without complication  Plan: stable     (E03.8) Other specified hypothyroidism  Plan: check TSH with free T4 reflex, on levoxyl 100 mcg daily, adjust dose after results.     (K21.9) Gastroesophageal reflux disease without esophagitis  Plan: stable on omeprazole.       (M62.08) Rectus diastasis  Plan: GENERAL SURG ADULT REFERRAL            COUNSELING:  Reviewed preventive health counseling, as reflected in patient instructions       Regular exercise       Healthy diet/nutrition    BP Readings from Last 1 Encounters:   05/14/18 102/58     Estimated body mass index is 50.2 kg/(m^2) as calculated from the following:    Height as of 5/14/18: 5' 6\" (1.676 m).    Weight as of 5/14/18: 311 lb (141.1 kg).      Weight management plan: Discussed healthy diet and exercise guidelines and patient will follow up in 12 months in clinic to re-evaluate.     reports that she has never smoked. She has never used smokeless tobacco.      Counseling Resources:  ATP IV Guidelines  Pooled Cohorts Equation Calculator  Breast Cancer Risk Calculator  FRAX Risk Assessment  ICSI Preventive Guidelines  Dietary Guidelines for Americans, 2010  USDA's MyPlate  ASA Prophylaxis  Lung CA Screening    Dhruv Andino MD  [facility]  Answers for HPI/ROS submitted by the patient on 11/19/2018   PHQ-2 Score: 0    "

## 2018-11-19 NOTE — MR AVS SNAPSHOT
After Visit Summary   11/19/2018    Gabi Roblero    MRN: [id number]           Patient Information     Date Of Birth          1980        Visit Information        Provider Department      11/19/2018 10:45 AM Dhruv Andino MD; [facility] [employer] [facility]        Today's Diagnoses     Encounter for routine adult health examination without abnormal findings    -  1    Moderate persistent asthma, unspecified whether complicated        Moderate persistent asthma without complication        Other specified hypothyroidism        Gastroesophageal reflux disease without esophagitis        Supervision of elderly multigravida, second trimester        Irregular periods        Morbid obesity (H)        Rectus diastasis          Care Instructions      Preventive Health Recommendations  Female Ages 26 - 39  Yearly exam:   See your health care provider every year in order to    Review health changes.     Discuss preventive care.      Review your medicines if you your doctor has prescribed any.    Until age 30: Get a Pap test every three years (more often if you have had an abnormal result).    After age 30: Talk to your doctor about whether you should have a Pap test every 3 years or have a Pap test with HPV screening every 5 years.   You do not need a Pap test if your uterus was removed (hysterectomy) and you have not had cancer.  You should be tested each year for STDs (sexually transmitted diseases), if you're at risk.   Talk to your provider about how often to have your cholesterol checked.  If you are at risk for diabetes, you should have a diabetes test (fasting glucose).  Shots: Get a flu shot each year. Get a tetanus shot every 10 years.   Nutrition:     Eat at least 5 servings of fruits and vegetables each day.    Eat whole-grain bread, whole-wheat pasta and brown rice instead of white grains and rice.    Get adequate Calcium and Vitamin D.      Lifestyle    Exercise at least 150 minutes a week (30 minutes a day, 5 days of the week). This will help you control your weight and prevent disease.    Limit alcohol to one drink per day.    No smoking.     Wear sunscreen to prevent skin cancer.    See your dentist every six months for an exam and cleaning.            Follow-ups after your visit        Additional Services     GENERAL SURG ADULT REFERRAL       Your provider has referred you to: [facility]: [facility] [phone]   [email or web address]    Please be aware that coverage of these services is subject to the terms and limitations of your health insurance plan.  Call member services at your health plan with any benefit or coverage questions.      Please bring the following with you to your appointment:    (1) Any X-Rays, CTs or MRIs which have been performed.  Contact the facility where they were done to arrange for  prior to your scheduled appointment.   (2) List of current medications   (3) This referral request   (4) Any documents/labs given to you for this referral                  Who to contact     If you have questions or need follow up information about today's clinic visit or your schedule please contact [facility] directly at [phone].  Normal or non-critical lab and imaging results will be communicated to you by MyChart, letter or phone within 4 business days after the clinic has received the results. If you do not hear from us within 7 days, please contact the clinic through [email or web address] or phone. If you have a critical or abnormal lab result, we will notify you by phone as soon as possible.  Submit refill requests through [facility] or call your pharmacy and they will forward the refill request to us. Please allow 3 business days for your refill to be completed.          Additional Information About Your Visit        [facility] Information     [facility] lets you send  "messages to your doctor, view your test results, renew your prescriptions, schedule appointments and more. To sign up, go to [email or web address] . Click on \"Log in\" on the left side of the screen, which will take you to the Welcome page. Then click on \"Sign up Now\" on the right side of the page.     You will be asked to enter the access code listed below, as well as some personal information. Please follow the directions to create your username and password.     Your access code is: [email or web address]  Expires: 2019 11:45 AM     Your access code will  in 90 days. If you need help or a new code, please call your [facility] clinic or [phone].        Care EveryWhere ID     This is your Care EveryWhere ID. This could be used by other organizations to access your [facility] medical records  [id number]        Your Vitals Were     Pulse Temperature Respirations Height Pulse Oximetry BMI (Body Mass Index)    87 98.2  F (36.8  C) (Oral) 24 5' 6\" (1.676 m) 96% 49.94 kg/m2       Blood Pressure from Last 3 Encounters:   18 98/64   18 102/58   18 90/60    Weight from Last 3 Encounters:   18 309 lb 6.4 oz (140.3 kg)   18 311 lb (141.1 kg)   18 311 lb 8 oz (141.3 kg)              We Performed the Following     CBC with platelets     Comprehensive metabolic panel     GENERAL SURG ADULT REFERRAL     Lipid panel reflex to direct LDL Fasting     TSH with free T4 reflex          Today's Medication Changes          These changes are accurate as of 18 11:45 AM.  If you have any questions, ask your nurse or doctor.               These medicines have changed or have updated prescriptions.        Dose/Directions    ferrous sulfate 325 (65 Fe) MG tablet   Commonly known as:  IRON   This may have changed:    - when to take this  - reasons to take this   Used for:  Iron deficiency anemia, unspecified iron deficiency anemia type        Dose:  325 mg   Take 1 tablet (325 mg) by mouth daily " (with breakfast)   Quantity:  90 tablet   Refills:  1         Stop taking these medicines if you haven't already. Please contact your care team if you have questions.     ibuprofen 400 MG tablet   Commonly known as:  ADVIL/MOTRIN   Stopped by:  Dhruv Andino MD                    Primary Care Provider Office Phone # Fax #    Dhruv Andino [phone]       [address] [facility] [address]        Equal Access to Services     [facility]: Hadii aad ku hadasho Soomaali, waaxda luqadaha, qaybta kaalmada adeegyada, waxay idiin hayaan adeeg kharash la'delano . So [facility] [phone].    ATENCIÓN: Si habla español, tiene a sims disposición servicios gratuitos de asistencia lingüística. [facility] [phone].    We comply with applicable federal civil rights laws and [place] laws. We do not discriminate on the basis of race, color, national origin, age, disability, sex, sexual orientation, or gender identity.            Thank you!     Thank you for choosing [facility]  for your care. Our goal is always to provide you with excellent care. Hearing back from our patients is one way we can continue to improve our services. Please take a few minutes to complete the written survey that you may receive in the mail after your visit with us. Thank you!             Your Updated Medication List - Protect others around you: Learn how to safely use, store and throw away your medicines at www.disposemymeds.org.          This list is accurate as of 18 11:45 AM.  Always use your most recent med list.                   Brand Name Dispense Instructions for use Diagnosis    acetaminophen 325 MG tablet    TYLENOL    100 tablet    Take 2 tablets (650 mg) by mouth every 4 hours as needed for mild pain or fever (greater than or equal to 38? C /100.4? F (oral) or 38.5? C/ 101.4? F (core).)     (spontaneous vaginal delivery)       * albuterol (2.5 MG/3ML) 0.083% neb solution      30 vial    Take 1 vial (2.5 mg) by nebulization every 6 hours as needed for shortness of breath / dyspnea or wheezing    Moderate persistent asthma without complication       * albuterol 108 (90 Base) MCG/ACT inhaler    PROAIR HFA    1 Inhaler    Inhale 2 puffs into the lungs every 4 hours as needed for shortness of breath / dyspnea    Moderate persistent asthma, unspecified whether complicated       dexamethasone 4 MG/ML injection    DECADRON    30 mL    Apply 1 mL (4 mg) topically once for 1 dose For physical therapy, iontophoresis    Pain in both feet, Pes planus of both feet, Plantar fasciitis, bilateral, Achilles tendonitis, bilateral       ferrous sulfate 325 (65 Fe) MG tablet    IRON    90 tablet    Take 1 tablet (325 mg) by mouth daily (with breakfast)    Iron deficiency anemia, unspecified iron deficiency anemia type       fluticasone furoate 200 MCG/ACT inhaler    ARNUITY ELLIPTA    1 Inhaler    Inhale 1 puff into the lungs daily    Moderate persistent asthma without complication       levothyroxine 100 MCG tablet    SYNTHROID/LEVOTHROID    90 tablet    Take 1 tablet (100 mcg) by mouth daily    Other specified hypothyroidism       naproxen 500 MG tablet    NAPROSYN    60 tablet    Take 1 tablet (500 mg) by mouth 2 times daily as needed for moderate pain    Foot pain, bilateral       omeprazole 40 MG capsule    priLOSEC    90 capsule    Take 1 capsule (40 mg) by mouth daily    Gastroesophageal reflux disease without esophagitis       order for DME     1 Device    Equipment being ordered: Nebulizer    Moderate persistent asthma without complication       prenatal multivitamin plus iron 27-0.8 MG Tabs per tablet     100 tablet    Take 1 tablet by mouth daily    Supervision of elderly multigravida, second trimester, Irregular periods       vitamin D3 2000 units tablet    CHOLECALCIFEROL    100 tablet    Take 2,000 Units by mouth daily    Iron deficiency anemia, unspecified iron deficiency anemia type        * Notice:  This list has 2 medication(s) that are the same as other medications prescribed for you. Read the directions carefully, and ask your doctor or other care provider to review them with you.

## 2018-11-19 NOTE — NURSING NOTE
"BP 98/64  Pulse 87  Temp 98.2  F (36.8  C) (Oral)  Resp 24  Ht 5' 6\" (1.676 m)  Wt 309 lb 6.4 oz (140.3 kg)  SpO2 96%  BMI 49.94 kg/m2    "

## 2018-11-20 LAB
ALBUMIN SERPL-MCNC: 3.5 G/DL (ref 3.4–5)
ALP SERPL-CCNC: 86 U/L (ref 40–150)
ALT SERPL W P-5'-P-CCNC: 20 U/L (ref 0–50)
ANION GAP SERPL CALCULATED.3IONS-SCNC: 6 MMOL/L (ref 3–14)
AST SERPL W P-5'-P-CCNC: 18 U/L (ref 0–45)
BILIRUB SERPL-MCNC: 0.5 MG/DL (ref 0.2–1.3)
BUN SERPL-MCNC: 10 MG/DL (ref 7–30)
CALCIUM SERPL-MCNC: 8.8 MG/DL (ref 8.5–10.1)
CHLORIDE SERPL-SCNC: 104 MMOL/L (ref 94–109)
CHOLEST SERPL-MCNC: 138 MG/DL
CO2 SERPL-SCNC: 28 MMOL/L (ref 20–32)
CREAT SERPL-MCNC: 0.56 MG/DL (ref 0.52–1.04)
GFR SERPL CREATININE-BSD FRML MDRD: >90 ML/MIN/1.7M2
GLUCOSE SERPL-MCNC: 84 MG/DL (ref 70–99)
HDLC SERPL-MCNC: 57 MG/DL
LDLC SERPL CALC-MCNC: 59 MG/DL
NONHDLC SERPL-MCNC: 81 MG/DL
POTASSIUM SERPL-SCNC: 4.1 MMOL/L (ref 3.4–5.3)
PROT SERPL-MCNC: 7.9 G/DL (ref 6.8–8.8)
SODIUM SERPL-SCNC: 138 MMOL/L (ref 133–144)
T4 FREE SERPL-MCNC: 1.2 NG/DL (ref 0.76–1.46)
TRIGL SERPL-MCNC: 110 MG/DL
TSH SERPL DL<=0.005 MIU/L-ACNC: 9.43 MU/L (ref 0.4–4)

## 2018-11-24 RX ORDER — LEVOTHYROXINE SODIUM 112 UG/1
112 TABLET ORAL DAILY
Qty: 60 TABLET | Refills: 1 | Status: SHIPPED | OUTPATIENT
Start: 2018-11-24 | End: 2020-02-26

## 2018-11-26 ENCOUNTER — OFFICE VISIT (OUTPATIENT)
Dept: SURGERY | Facility: CLINIC | Age: 38
End: 2018-11-26
Payer: COMMERCIAL

## 2018-11-26 VITALS
RESPIRATION RATE: 16 BRPM | WEIGHT: 293 LBS | HEART RATE: 60 BPM | DIASTOLIC BLOOD PRESSURE: 64 MMHG | HEIGHT: 66 IN | OXYGEN SATURATION: 99 % | SYSTOLIC BLOOD PRESSURE: 100 MMHG | BODY MASS INDEX: 47.09 KG/M2

## 2018-11-26 DIAGNOSIS — R10.11 ABDOMINAL PAIN, RIGHT UPPER QUADRANT: Primary | ICD-10-CM

## 2018-11-26 DIAGNOSIS — R10.13 ABDOMINAL PAIN, EPIGASTRIC: Primary | ICD-10-CM

## 2018-11-26 PROCEDURE — 99204 OFFICE O/P NEW MOD 45 MIN: CPT | Performed by: SURGERY

## 2018-11-26 NOTE — PROGRESS NOTES
"  HPI      ROS      Physical Exam      Assessment:    Gabi Roblero is a 38 year old female seen in consultation for abdominal pain, at the request of Dhruv Andino MD.    The patient presents with abdominal pain in the epigastric region and in the right upper quadrant.  There are no palpable hernias on exam or visible on the CT of the abdomen and pelvis obtained in April 2017.  This likely represents an abdominal wall muscle strain vs occult hernia.      The patient does have a significant (> 10cm) rectus diastasis.   This is not expected to cause pain and is not a threat to her health and therefore I do not recommend surgical intervention.    Plan:  We have discussed the option of a repeat CT with valsalva for a possible occult hernia.  I have recommended she avoid activities that cause or worsen the pain.    The patient would like to proceed with a repeat CT of the abdomen and pelvis.  We will order this for her.    HPI:  Gabi Roblero is a 38 year old female who presents for evaluation of abdominal pain in the epigastric region and in the right upper quadrant.  Had open cholecystectomy in [place] in 2003.  This pain is different than pain prior to cholecystectomy.   Of note, the patient has 4 children and may have additional children in the future.    Duration:  9 years   Pain quality:  Intermittent,daily, stabbing.  Sharp \"needle - like\" pain.  Inciting event:  No  Exacerbating factors:  Bending, coughing, straining for BM.   Nausea/vomitting/bloating:  No   Bulge/mass:  Yes    Previous surgery in this location:  No   Previous herniorrhaphy:  No     Had an EGD 1 ya with [facility].  Normal per pt report.    Constipation: Yes, uses miralax  Cough: Yes, with asthma  Diabetes: No  Current Smoker: No    Heavy lifting > 20 lb: No      Past Medical History:   has a past medical history of GERD (gastroesophageal reflux disease); Hypothyroidism; and Moderate persistent asthma (4/28/2016).    Past " "Surgical History:  Past Surgical History:   Procedure Laterality Date     CHOLECYSTECTOMY  2003        Social History:  Social History     Social History     Marital status:      Spouse name: N/A     Number of children: N/A     Years of education: N/A     Occupational History     Not on file.     Social History Main Topics     Smoking status: Never Smoker     Smokeless tobacco: Never Used     Alcohol use No     Drug use: No     Sexual activity: Yes     Partners: Male     Other Topics Concern      Service No     Blood Transfusions No     Caffeine Concern No     Occupational Exposure No     Hobby Hazards No     Sleep Concern No     Stress Concern No     Weight Concern Yes     Special Diet No     Back Care No     Exercise No     Bike Helmet No     Seat Belt Yes     Self-Exams Yes     Social History Narrative        Family History:  Family History   Problem Relation Age of Onset     Diabetes Father      Family History Negative Mother      Cancer No family hx of      Cerebrovascular Disease No family hx of      Hypertension No family hx of      C.A.D. No family hx of      Asthma No family hx of      Family history reviewed and not pertinent.    ROS:  The 10 point review of systems is negative other than noted in the HPI and above.    PE:    /64 (BP Location: Left arm, Cuff Size: Adult Large)  Pulse 60  Resp 16  Ht 5' 6\" (1.676 m)  Wt 309 lb (140.2 kg)  LMP 10/23/2018 (Approximate)  SpO2 99%  Breastfeeding? Yes  BMI 49.87 kg/m2  General - Well developed, well nourished female in no apparent distress  HEENT:  Head normocephalic and atraumatic, pupils equal and round, conjunctivae clear, no scleral icterus, mucous membranes moist, external ears and nose normal  Pulmonary: Respirations unlabored  Abdomen:  abdomen is soft without significant tenderness, masses, organomegaly or guarding.  Wide rectus diatsasis and eventration of the right upper quadrant abdominal wall.  No are no palpable hernias " on exam.  There is tenderness over the right upper quadrant subcostal incisional scar without subcutaneous mass or hernia.  Extremities: Warm without edema  Musculoskeletal:  Normal station and gait  Neurologic: alert, speech is clear, moves all extremities with good strength  Psychiatric: Mood and affect appropriate  Skin: Without lesions or rashes, or juandice    IMAGING:    All imaging studies reviewed by me with the patient.    CT ABDOMEN AND PELVIS WITH CONTRAST  4/20/2017 4:52 AM      HISTORY: Recurrent abdominal pain, nausea and vomiting.     COMPARISON: 1/31/2013.     TECHNIQUE: Following the uneventful administration of 100 mL  Isovue-370 intravenous contrast, helical sections were acquired from  the top of the diaphragm through the pubic symphysis. Coronal  reconstructions were generated. Radiation dose for this scan was  reduced using automated exposure control, adjustment of the mA and/or  kV according to the patient's size, or iterative reconstruction  technique.     FINDINGS:  Abdomen: The liver is prominent in size. The spleen, pancreas, adrenal  glands and kidneys are unremarkable. The gallbladder is not  visualized. No enlarged lymph nodes in the upper abdomen.     Scan through the lower chest is unremarkable.     Pelvis: The small and large bowel are normal in caliber. Mild haziness  is present within the jejunal mesentery. There is also a trace amount  of free fluid abutting the jejunum in the mid left hemiabdomen (series  2 image 27). No convincing bowel wall thickening, pneumatosis or free  intraperitoneal gas. The appendix is not visualized. The uterus is  present. A small amount of free fluid in the pelvis. No enlarged lymph  nodes in the pelvis.         IMPRESSION:  1. Mild haziness in the mesenteric fat of the jejunum and a trace  amount of fluid abutting the jejunum in the upper left hemiabdomen.  These findings are nonspecific, but could relate to enteritis.  2. A small amount of free  fluid in the pelvis.  3. No other cause of acute pain identified in the abdomen or pelvis.  Note that the appendix is not visualized.     TOLU SMITH MD    This note was created using voice recognition software. Undetected word substitutions or other errors may have occurred.     Time spent with the patient with greater that 50% of the time in discussion was 15 minutes.     Lisa Jhaveri MD    Please route or send letter to:  Primary Care Provider (PCP) and Referring Provider

## 2018-11-26 NOTE — LETTER
"2018    Re: Gabi Roblero - 1980    Gabi Roblero is a 38 year old female seen in consultation for abdominal pain, at the request of Dhruv Andino MD.     The patient presents with abdominal pain in the epigastric region and in the right upper quadrant. There are no palpable hernias on exam or visible on the CT of the abdomen and pelvis obtained in 2017.  This likely represents an abdominal wall muscle strain vs occult hernia.       The patient does have a significant (> 10cm) rectus diastasis.   This is not expected to cause pain and is not a threat to her health and therefore I do not recommend surgical intervention.     Plan:  We have discussed the option of a repeat CT with valsalva for a possible occult hernia.  I have recommended she avoid activities that cause or worsen the pain.     The patient would like to proceed with a repeat CT of the abdomen and pelvis.  We will order this for her.     HPI:  Gabi Roblero is a 38 year old female who presents for evaluation of abdominal pain in the epigastric region and in the right upper quadrant.  Had open cholecystectomy in [place] in .  This pain is different than pain prior to cholecystectomy.   Of note, the patient has 4 children and may have additional children in the future.     Duration:  9 years   Pain quality:  Intermittent,daily, stabbing.  Sharp \"needle - like\" pain.  Inciting event:  No  Exacerbating factors:  Bending, coughing, straining for BM.   Nausea/vomitting/bloating:  No   Bulge/mass:  Yes    Previous surgery in this location:  No   Previous herniorrhaphy:  No      Had an EGD 1 ya with [facility].  Normal per pt report.     Constipation: Yes, uses miralax  Cough: Yes, with asthma  Diabetes: No  Current Smoker: No     Heavy lifting > 20 lb: No      Past Medical History:  has a past medical history of GERD (gastroesophageal reflux disease); Hypothyroidism; and Moderate persistent asthma " "(4/28/2016).     Family history reviewed and not pertinent.     ROS:  The 10 point review of systems is negative other than noted in the HPI and above.     PE:    /64 (BP Location: Left arm, Cuff Size: Adult Large)  Pulse 60  Resp 16  Ht 5' 6\" (1.676 m)  Wt 309 lb (140.2 kg)  LMP 10/23/2018 (Approximate)  SpO2 99%  Breastfeeding? Yes  BMI 49.87 kg/m2  General - Well developed, well nourished female in no apparent distress  HEENT:  Head normocephalic and atraumatic, pupils equal and round, conjunctivae clear, no scleral icterus, mucous membranes moist, external ears and nose normal  Pulmonary: Respirations unlabored  Abdomen:  abdomen is soft without significant tenderness, masses, organomegaly or guarding.  Wide rectus diatsasis and eventration of the right upper quadrant abdominal wall.  No are no palpable hernias on exam.  There is tenderness over the right upper quadrant subcostal incisional scar without subcutaneous mass or hernia.  Extremities: Warm without edema  Musculoskeletal:  Normal station and gait  Neurologic: alert, speech is clear, moves all extremities with good strength  Psychiatric: Mood and affect appropriate  Skin: Without lesions or rashes, or juandice     IMAGING:     All imaging studies reviewed by me with the patient.     CT ABDOMEN AND PELVIS WITH CONTRAST  4/20/2017 4:52 AM       HISTORY: Recurrent abdominal pain, nausea and vomiting.      COMPARISON: 1/31/2013.      TECHNIQUE: Following the uneventful administration of 100 mL  Isovue-370 intravenous contrast, helical sections were acquired from  the top of the diaphragm through the pubic symphysis. Coronal  reconstructions were generated. Radiation dose for this scan was  reduced using automated exposure control, adjustment of the mA and/or  kV according to the patient's size, or iterative reconstruction  technique.      FINDINGS:  Abdomen: The liver is prominent in size. The spleen, pancreas, adrenal  glands and kidneys are " unremarkable. The gallbladder is not  visualized. No enlarged lymph nodes in the upper abdomen.      Scan through the lower chest is unremarkable.      Pelvis: The small and large bowel are normal in caliber. Mild haziness  is present within the jejunal mesentery. There is also a trace amount  of free fluid abutting the jejunum in the mid left hemiabdomen (series  2 image 27). No convincing bowel wall thickening, pneumatosis or free  intraperitoneal gas. The appendix is not visualized. The uterus is  present. A small amount of free fluid in the pelvis. No enlarged lymph  nodes in the pelvis.          IMPRESSION:  1. Mild haziness in the mesenteric fat of the jejunum and a trace  amount of fluid abutting the jejunum in the upper left hemiabdomen.  These findings are nonspecific, but could relate to enteritis.  2. A small amount of free fluid in the pelvis.  3. No other cause of acute pain identified in the abdomen or pelvis.  Note that the appendix is not visualized.      MD Lisa SHAFFER MD

## 2018-11-26 NOTE — MR AVS SNAPSHOT
After Visit Summary   11/26/2018    Gabi Roblero    MRN: [id number]           Patient Information     Date Of Birth          1980        Visit Information        Provider Department      11/26/2018 2:15 PM Lisa Jhaveri MD; [employer] [facility] General Surgery      Today's Diagnoses     Abdominal pain, right upper quadrant    -  1      Care Instructions    CT ABDOMEN AND PELVIS WITH CONTRAST    Date: 12/5/2018   Time: 1:00 PM   Location: [address]  [address]       Please check in at 12:45 PM       Preparation for CT scanning      Do not eat or drink anything TWO hours prior to your exam:    Please bring an updated list of all your medications, including IV Chemo Therapy over the counter and herbal supplements.    For questions regarding your test please call [phone].   If you are taking any medications and you have questions, please call your primary care physician.                   Follow-ups after your visit        Your next 10 appointments already scheduled     Dec 05, 2018  1:00 PM CST   CT ABDOMEN PELVIS W CONTRAST with [id number]   [facility] ([facility])    [id number] [place] [address]   [phone]           How do I prepare for my exam? (Food and drink instructions) To prepare: Do not eat or drink for 2 hours before your exam. If you need to take medicine, you may take it with small sips of water. (We may ask you to take liquid medicine as well.)  How do I prepare for my exam? (Other instructions) Please arrive 30 minutes early for your CT.  Once in the department you might be asked to drink water 15-20 minutes prior to your exam.  If indicated you may be asked to drink an oral contrast in advance of your CT.  If this is the case, the imaging team will let you  know or be in contact with you prior to your appointment  Patients over 70 or patients with diabetes or kidney problems: If you haven t had a blood test (creatinine test) within the last 30 days, the Cardiologist/Radiologist may require you to get this test prior to your exam.  If you have diabetes:  Continue to take your metformin medication on the day of your exam  What should I wear: Please wear loose clothing, such as a sweat suit or jogging clothes. Avoid snaps, zippers and other metal. We may ask you to undress and put on a hospital gown.  How long does the exam take: Most scans take less than 20 minutes.  What should I bring: Please bring any scans or X-rays taken at other hospitals, if similar tests were done. Also bring a list of your medicines, including vitamins, minerals and over-the-counter drugs. It is safest to leave personal items at home.  Do I need a : No  is needed.  What do I need to tell my doctor? Be sure to tell your doctor: * If you have any allergies. * If there s any chance you are pregnant. * If you are breastfeeding.  What should I do after the exam: No restrictions, You may resume normal activities.  What is this test: A CT (computed tomography) scan is a series of pictures that allows us to look inside your body. The scanner creates images of the body in cross sections, much like slices of bread. This helps us see any problems more clearly. You may receive contrast (X-ray dye) before or during your scan. You will be asked to drink the contrast.  Who should I call with questions: If you have any questions, please call the Imaging Department where you will have your exam. Directions, parking instructions, and other information is available on our website, [email or web address].              Future tests that were ordered for you today     Open Future Orders        Priority Expected Expires Ordered    CT Abdomen Pelvis w Contrast Routine 11/26/2018 11/26/2019 11/26/2018           "  Who to contact     If you have questions or need follow up information about today's clinic visit or your schedule please contact [facility] directly at [phone].  Normal or non-critical lab and imaging results will be communicated to you by [employer], letter or phone within 4 business days after the clinic has received the results. If you do not hear from us within 7 days, please contact the clinic through [employer] or phone. If you have a critical or abnormal lab result, we will notify you by phone as soon as possible.  Submit refill requests through "[employer]" or call your pharmacy and they will forward the refill request to us. Please allow 3 business days for your refill to be completed.          Additional Information About Your Visit        "[employer] Information     "[employer] lets you send messages to your doctor, view your test results, renew your prescriptions, schedule appointments and more. To sign up, go to [email or web address]" . Click on \"Log in\" on the left side of the screen, which will take you to the Welcome page. Then click on \"Sign up Now\" on the right side of the page.     You will be asked to enter the access code listed below, as well as some personal information. Please follow the directions to create your username and password.     Your access code is: [email or web address]  Expires: 2019 11:45 AM     Your access code will  in 90 days. If you need help or a new code, please call your [facility] clinic or [phone].        Care EveryWhere ID     This is your Care EveryWhere ID. This could be used by other organizations to access your [facility] medical records  [id number]        Your Vitals Were     Pulse Respirations Height Last Period Pulse Oximetry Breastfeeding?    60 16 5' 6\" (1.676 m) 10/23/2018 (Approximate) 99% Yes    BMI (Body Mass Index)                   49.87 kg/m2            Blood Pressure from Last 3 Encounters:   18 100/64   18 98/64   18 " 102/58    Weight from Last 3 Encounters:   18 309 lb (140.2 kg)   18 309 lb 6.4 oz (140.3 kg)   18 311 lb (141.1 kg)              Today, you had the following     No orders found for display       Primary Care Provider Office Phone # Fax #    Dhruv AMY Andino -[phone]       [address] [facility] [address]        Equal Access to Services     ZAID [facility] Hospital[facility]ANCHAN : Hadii aad ku hadasho Soomaali, waaxda luqadaha, qaybta kaalmada adeegyada, waxay idiin hayaan adeeg yahir guevara . So [facility] [phone].    ATENCIÓN: Si habla español, tiene a sims disposición servicios gratuitos de asistencia lingüística. [facility] [phone].    We comply with applicable federal civil rights laws and [place] laws. We do not discriminate on the basis of race, color, national origin, age, disability, sex, sexual orientation, or gender identity.            Thank you!     Thank you for choosing [facility]  for your care. Our goal is always to provide you with excellent care. Hearing back from our patients is one way we can continue to improve our services. Please take a few minutes to complete the written survey that you may receive in the mail after your visit with us. Thank you!             Your Updated Medication List - Protect others around you: Learn how to safely use, store and throw away your medicines at www.disposemymeds.org.          This list is accurate as of 18  3:18 PM.  Always use your most recent med list.                   Brand Name Dispense Instructions for use Diagnosis    acetaminophen 325 MG tablet    TYLENOL    100 tablet    Take 2 tablets (650 mg) by mouth every 4 hours as needed for mild pain or fever (greater than or equal to 38? C /100.4? F (oral) or 38.5? C/ 101.4? F (core).)     (spontaneous vaginal delivery)       * albuterol (2.5 MG/3ML) 0.083% neb solution    PROVENTIL    30 vial    Take 1 vial (2.5 mg) by nebulization every 6  hours as needed for shortness of breath / dyspnea or wheezing    Moderate persistent asthma without complication       * albuterol 108 (90 Base) MCG/ACT inhaler    PROAIR HFA    1 Inhaler    Inhale 2 puffs into the lungs every 4 hours as needed for shortness of breath / dyspnea    Moderate persistent asthma, unspecified whether complicated       fluticasone 200 MCG/ACT inhaler    ARNUITY ELLIPTA    1 Inhaler    Inhale 1 puff into the lungs daily    Moderate persistent asthma without complication       levothyroxine 112 MCG tablet    SYNTHROID/LEVOTHROID    60 tablet    Take 1 tablet (112 mcg) by mouth daily    Other specified hypothyroidism       omeprazole 40 MG DR capsule    priLOSEC    90 capsule    Take 1 capsule (40 mg) by mouth daily    Gastroesophageal reflux disease without esophagitis       order for DME     1 Device    Equipment being ordered: Nebulizer    Moderate persistent asthma without complication       * Notice:  This list has 2 medication(s) that are the same as other medications prescribed for you. Read the directions carefully, and ask your doctor or other care provider to review them with you.

## 2018-11-26 NOTE — PATIENT INSTRUCTIONS
CT ABDOMEN AND PELVIS WITH CONTRAST    Date: 12/5/2018   Time: 1:00 PM   Location: [facility]  [id number] [place] [address] [address]  [place]  [address]       Please check in at 12:45 PM       Preparation for CT scanning      Do not eat or drink anything TWO hours prior to your exam:    Please bring an updated list of all your medications, including IV Chemo Therapy over the counter and herbal supplements.    For questions regarding your test please call [phone].   If you are taking any medications and you have questions, please call your primary care physician.

## 2018-12-14 ENCOUNTER — HOSPITAL ENCOUNTER (OUTPATIENT)
Dept: CT IMAGING | Facility: CLINIC | Age: 38
Discharge: HOME | End: 2018-12-14
Attending: SURGERY | Admitting: SURGERY
Payer: COMMERCIAL

## 2018-12-14 DIAGNOSIS — R10.13 ABDOMINAL PAIN, EPIGASTRIC: ICD-10-CM

## 2018-12-14 PROCEDURE — 40000556 ZZH STATISTIC PERIPHERAL IV START W US GUIDANCE

## 2018-12-14 PROCEDURE — 25000128 H RX IP 250 OP 636: Performed by: RADIOLOGY

## 2018-12-14 PROCEDURE — 74177 CT ABD & PELVIS W/CONTRAST: CPT

## 2018-12-14 RX ORDER — IOPAMIDOL 755 MG/ML
500 INJECTION, SOLUTION INTRAVASCULAR ONCE
Status: COMPLETED | OUTPATIENT
Start: 2018-12-14 | End: 2018-12-14

## 2018-12-14 RX ADMIN — IOPAMIDOL 100 ML: 755 INJECTION, SOLUTION INTRAVENOUS at 14:55

## 2018-12-14 RX ADMIN — SODIUM CHLORIDE 55 ML: 9 INJECTION, SOLUTION INTRAVENOUS at 14:55

## 2018-12-20 NOTE — RESULT ENCOUNTER NOTE
Please call Gabi with a [facility]  and tell her that I have reviewed her CT results along with my partner Dr. Peña and we agree that there is not a hernia, rather a laxity of the fascia which is unchanged compared with her 2017 CT (we discussed this in clinic and looked at her 2017 CT together).  There are changes in the right upper abdomen consistent with surgical changes after her open cholecystectomy.  This is also unchanged and we do not see a hernia here.

## 2019-05-20 DIAGNOSIS — E03.8 OTHER SPECIFIED HYPOTHYROIDISM: ICD-10-CM

## 2019-05-20 NOTE — LETTER
[place] [address]  [facility] [address]  [phone]        June 4, 2019      Gabi Roblero  [address] [facility] [address]          Dear Gabi Roblero    APPOINTMENT REMINDER:   Our records indicates that it is time for you to be seen for a recheck.     You will need to be seen before any refills can be approved.    Taking care of your health is important to us, and ongoing visits with your provider are vital to your care.    We look forward to seeing you in the near future.  You may call our office at [phone] to schedule a visit.    Please disregard this notice if you have already made an appointment.      Sincerely,        Omar Andino MD

## 2019-05-20 NOTE — TELEPHONE ENCOUNTER
"Requested Prescriptions   Pending Prescriptions Disp Refills     levothyroxine (SYNTHROID/LEVOTHROID) 112 MCG tablet [Pharmacy Med Name: LEVOTHYROXINE 0.112MG (112MCG) TABS] 60 tablet 0     Sig: TAKE 1 TABLET BY MOUTH EVERY DAY   Last Written Prescription Date:  11/24/2018  Last Fill Quantity: 60,  # refills: 1   Last office visit: 11/19/2018 with prescribing provider:     Future Office Visit:      Thyroid Protocol Failed - 5/20/2019  4:11 PM        Failed - Normal TSH on file in past 12 months     Recent Labs   Lab Test 11/19/18  1159   TSH 9.43*              Passed - Patient is 12 years or older        Passed - Recent (12 mo) or future (30 days) visit within the authorizing provider's specialty     Patient had office visit in the last 12 months or has a visit in the next 30 days with authorizing provider or within the authorizing provider's specialty.  See \"Patient Info\" tab in inbasket, or \"Choose Columns\" in Meds & Orders section of the refill encounter.              Passed - Medication is active on med list        Passed - No active pregnancy on record     If patient is pregnant or has had a positive pregnancy test, please check TSH.          Passed - No positive pregnancy test in past 12 months     If patient is pregnant or has had a positive pregnancy test, please check TSH.          "

## 2019-05-21 NOTE — TELEPHONE ENCOUNTER
"Left a voicemail with the assistance of a [facility]  asking patient to call the clinic back. Patient needs TSH labs, and is due for an asthma follow up.    Per 11/19/18 result note:  \"all tests normal except Thyroid test ( thyroid under treated )  Pl advise pt to increase levoxyl to 112 mcg daily and rpt TSH in 6 wks.  New dose has been faxed pl inform pt.\"  Patient did not follow up, future lab orders in chart. Last refill 11/24/18 for a four month supply       "

## 2019-06-03 DIAGNOSIS — E03.8 OTHER SPECIFIED HYPOTHYROIDISM: ICD-10-CM

## 2019-06-03 RX ORDER — LEVOTHYROXINE SODIUM 112 UG/1
TABLET ORAL
Qty: 60 TABLET | Refills: 0 | OUTPATIENT
Start: 2019-06-03

## 2019-06-04 NOTE — TELEPHONE ENCOUNTER
"Requested Prescriptions   Pending Prescriptions Disp Refills     levothyroxine (SYNTHROID/LEVOTHROID) 112 MCG tablet [Pharmacy Med Name:   LEVOTHYROXINE 0.112MG (112MCG) TABS]    Last Written Prescription Date:  11/24/18  Last Fill Quantity: 60,  # refills: 1   Last office visit: 11/19/2018 with prescribing provider:  Sina   Future Office Visit:     60 tablet 0     Sig: TAKE 1 TABLET BY MOUTH EVERY DAY       Thyroid Protocol Failed - 6/3/2019  7:33 PM        Failed - Normal TSH on file in past 12 months     Recent Labs   Lab Test 11/19/18  1159   TSH 9.43*              Passed - Patient is 12 years or older        Passed - Recent (12 mo) or future (30 days) visit within the authorizing provider's specialty     Patient had office visit in the last 12 months or has a visit in the next 30 days with authorizing provider or within the authorizing provider's specialty.  See \"Patient Info\" tab in inbasket, or \"Choose Columns\" in Meds & Orders section of the refill encounter.              Passed - Medication is active on med list        Passed - No active pregnancy on record     If patient is pregnant or has had a positive pregnancy test, please check TSH.          Passed - No positive pregnancy test in past 12 months     If patient is pregnant or has had a positive pregnancy test, please check TSH.            "

## 2019-06-05 RX ORDER — LEVOTHYROXINE SODIUM 112 UG/1
TABLET ORAL
Qty: 60 TABLET | Refills: 0 | OUTPATIENT
Start: 2019-06-05

## 2019-06-05 NOTE — TELEPHONE ENCOUNTER
See refill encounter from 5/20/19. Per primary care provider, patient needs labs before refill. Letter mailed on 6/4/19. IM clinic currently closed, please call patient to schedule.

## 2019-06-05 NOTE — TELEPHONE ENCOUNTER
Call to Family member. He states she is in [place] right now. He doesn't know for how long.     Medication is probably on automatic refill.

## 2019-06-17 DIAGNOSIS — J45.40 MODERATE PERSISTENT ASTHMA WITHOUT COMPLICATION: ICD-10-CM

## 2019-06-17 NOTE — TELEPHONE ENCOUNTER
Pending Prescriptions:                       Disp   Refills    fluticasone (ARNUITY ELLIPTA) 200 MCG/ACT*                    Sig: Inhale 1 puff into the lungs daily    Last filled 4-1-19       Thank you,  Sandrita Miranda [facility]  [phone]

## 2019-06-20 NOTE — TELEPHONE ENCOUNTER
"Requested Prescriptions   Pending Prescriptions Disp Refills     fluticasone (ARNUITY ELLIPTA) 200 MCG/ACT inhaler       Sig: Inhale 1 puff into the lungs daily       Inhaled Steroids Protocol Failed - 6/17/2019  5:05 PM        Failed - Asthma control assessment score within normal limits in last 6 months     Please review ACT score.           Failed - Recent (6 mo) or future (30 days) visit within the authorizing provider's specialty     Patient had office visit in the last 6 months or has a visit in the next 30 days with authorizing provider or within the authorizing provider's specialty.  See \"Patient Info\" tab in inbasket, or \"Choose Columns\" in Meds & Orders section of the refill encounter.            Passed - Patient is age 12 or older        Passed - Medication is active on med list        Routing refill request to provider for review/approval because:  Last ASTHMA CONTROL TEST score 5/1/18 was 21. Last office visit 11/19/18 for annual exam. No mention of 6 month follow up needed. Please advise.      "

## 2020-02-26 ENCOUNTER — OFFICE VISIT (OUTPATIENT)
Dept: INTERNAL MEDICINE | Facility: CLINIC | Age: 40
End: 2020-02-26
Payer: MEDICAID

## 2020-02-26 ENCOUNTER — TELEPHONE (OUTPATIENT)
Dept: INTERNAL MEDICINE | Facility: CLINIC | Age: 40
End: 2020-02-26

## 2020-02-26 VITALS
TEMPERATURE: 98 F | BODY MASS INDEX: 47.09 KG/M2 | HEIGHT: 66 IN | OXYGEN SATURATION: 99 % | DIASTOLIC BLOOD PRESSURE: 88 MMHG | WEIGHT: 293 LBS | RESPIRATION RATE: 20 BRPM | HEART RATE: 89 BPM | SYSTOLIC BLOOD PRESSURE: 132 MMHG

## 2020-02-26 DIAGNOSIS — J45.40 MODERATE PERSISTENT ASTHMA WITHOUT COMPLICATION: ICD-10-CM

## 2020-02-26 DIAGNOSIS — J45.40 MODERATE PERSISTENT ASTHMA, UNSPECIFIED WHETHER COMPLICATED: ICD-10-CM

## 2020-02-26 DIAGNOSIS — E03.8 OTHER SPECIFIED HYPOTHYROIDISM: ICD-10-CM

## 2020-02-26 DIAGNOSIS — S16.1XXA STRAIN OF NECK MUSCLE, INITIAL ENCOUNTER: Primary | ICD-10-CM

## 2020-02-26 DIAGNOSIS — K21.9 GASTROESOPHAGEAL REFLUX DISEASE WITHOUT ESOPHAGITIS: ICD-10-CM

## 2020-02-26 PROCEDURE — 99214 OFFICE O/P EST MOD 30 MIN: CPT | Performed by: NURSE PRACTITIONER

## 2020-02-26 PROCEDURE — 36415 COLL VENOUS BLD VENIPUNCTURE: CPT | Performed by: NURSE PRACTITIONER

## 2020-02-26 PROCEDURE — T1013 SIGN LANG/ORAL INTERPRETER: HCPCS | Mod: U3 | Performed by: NURSE PRACTITIONER

## 2020-02-26 PROCEDURE — 84443 ASSAY THYROID STIM HORMONE: CPT | Performed by: NURSE PRACTITIONER

## 2020-02-26 RX ORDER — ALBUTEROL SULFATE 90 UG/1
2 AEROSOL, METERED RESPIRATORY (INHALATION) EVERY 4 HOURS PRN
Qty: 1 INHALER | Refills: 1 | Status: SHIPPED | OUTPATIENT
Start: 2020-02-26 | End: 2020-07-13

## 2020-02-26 RX ORDER — ALBUTEROL SULFATE 0.83 MG/ML
2.5 SOLUTION RESPIRATORY (INHALATION) EVERY 6 HOURS PRN
Qty: 30 VIAL | Refills: 0 | Status: SHIPPED | OUTPATIENT
Start: 2020-02-26 | End: 2020-04-02

## 2020-02-26 RX ORDER — LEVOTHYROXINE SODIUM 112 UG/1
112 TABLET ORAL DAILY
Qty: 60 TABLET | Refills: 1 | Status: SHIPPED | OUTPATIENT
Start: 2020-02-26 | End: 2020-07-01 | Stop reason: DRUGHIGH

## 2020-02-26 RX ORDER — OMEPRAZOLE 40 MG/1
40 CAPSULE, DELAYED RELEASE ORAL DAILY
Qty: 90 CAPSULE | Refills: 3 | Status: SHIPPED | OUTPATIENT
Start: 2020-02-26 | End: 2021-12-06

## 2020-02-26 ASSESSMENT — MIFFLIN-ST. JEOR: SCORE: 2052.08

## 2020-02-26 NOTE — PROGRESS NOTES
Subjective     Gabi Roblero is a 40 year old female who presents to clinic today for the following health issues:    HPI   Asthma Follow-Up    Was ACT completed today?    Yes    ACT Total Scores 5/1/2018   ACT TOTAL SCORE -   ASTHMA ER VISITS -   ASTHMA HOSPITALIZATIONS -   ACT TOTAL SCORE (Goal Greater than or Equal to 20) 21   In the past 12 months, how many times did you visit the emergency room for your asthma without being admitted to the hospital? 0   In the past 12 months, how many times were you hospitalized overnight because of your asthma? 0       How many days per week do you miss taking your asthma controller medication?  I do not have an asthma controller medication    Please describe any recent triggers for your asthma: upper respiratory infections, dust mites, pollens and animal dander    Have you had any Emergency Room Visits, Urgent Care Visits, or Hospital Admissions since your last office visit?  Yes  Number of ER or Urgent Care visits for asthma: 1    Hypothyroidism Follow-up      Since last visit, patient describes the following symptoms: Weight stable, no hair loss, no skin changes, no constipation, no loose stools, dry skin and fatigue      How many servings of fruits and vegetables do you eat daily?  2-3    On average, how many sweetened beverages do you drink each day (Examples: soda, juice, sweet tea, etc.  Do NOT count diet or artificially sweetened beverages)?   0    How many days per week do you exercise enough to make your heart beat faster? 3 or less    How many minutes a day do you exercise enough to make your heart beat faster? 9 or less    How many days per week do you miss taking your medication? 0    Neck Pain      Duration: 1 month    Description:  Location: bilat posterior neck and shoulders  Radiation: none    Intensity:  mild    Accompanying signs and symptoms: none    History (similar episodes/previous evaluation): None    Precipitating or alleviating factors:  None    Therapies tried and outcome: tylenol,  heat      Patient Active Problem List   Diagnosis     Gastritis     CARDIOVASCULAR SCREENING; LDL GOAL LESS THAN 160     Vitamin D deficiency     GERD (gastroesophageal reflux disease)     Rotator cuff syndrome     Moderate persistent asthma     Other specified hypothyroidism     Morbid obesity (H)     Hypothyroidism     Past Surgical History:   Procedure Laterality Date     CHOLECYSTECTOMY  2003    open, in [place]       Social History     Tobacco Use     Smoking status: Never Smoker     Smokeless tobacco: Never Used   Substance Use Topics     Alcohol use: No     Alcohol/week: 0.0 standard drinks     Family History   Problem Relation Age of Onset     Diabetes Father      Family History Negative Mother      Cancer No family hx of      Cerebrovascular Disease No family hx of      Hypertension No family hx of      C.A.D. No family hx of      Asthma No family hx of          Current Outpatient Medications   Medication Sig Dispense Refill     albuterol (PROAIR HFA) 108 (90 Base) MCG/ACT inhaler Inhale 2 puffs into the lungs every 4 hours as needed for shortness of breath / dyspnea 1 Inhaler 1     albuterol (PROVENTIL) (2.5 MG/3ML) 0.083% neb solution Take 1 vial (2.5 mg) by nebulization every 6 hours as needed for shortness of breath / dyspnea or wheezing 30 vial 0     fluticasone (ARNUITY ELLIPTA) 200 MCG/ACT inhaler Inhale 1 puff into the lungs daily 1 each 5     levothyroxine (SYNTHROID/LEVOTHROID) 112 MCG tablet Take 1 tablet (112 mcg) by mouth daily 60 tablet 1     omeprazole (PRILOSEC) 40 MG DR capsule Take 1 capsule (40 mg) by mouth daily 90 capsule 3     acetaminophen (TYLENOL) 325 MG tablet Take 2 tablets (650 mg) by mouth every 4 hours as needed for mild pain or fever (greater than or equal to 38  C /100.4  F (oral) or 38.5  C/ 101.4  F (core).) (Patient not taking: Reported on 2/26/2020) 100 tablet 1     order for DME Equipment being ordered: Nebulizer (Patient not  "taking: Reported on 2/26/2020) 1 Device 0     BP Readings from Last 3 Encounters:   02/26/20 132/88   11/26/18 100/64   11/19/18 98/64    Wt Readings from Last 3 Encounters:   02/26/20 136.5 kg (301 lb)   11/26/18 140.2 kg (309 lb)   11/19/18 140.3 kg (309 lb 6.4 oz)                      Reviewed and updated as needed this visit by Provider         Review of Systems   ROS COMP: Constitutional, HEENT, cardiovascular, pulmonary, gi and gu systems are negative, except as otherwise noted.      Objective    /88   Pulse 89   Temp 98  F (36.7  C) (Oral)   Resp 20   Ht 1.676 m (5' 6\")   Wt 136.5 kg (301 lb)   LMP 01/25/2020   SpO2 99%   BMI 48.58 kg/m      Physical Exam   GENERAL: alert, no distress and obese  NECK: no adenopathy, thyroid normal to palpation. ROM mildly decreased 2/2 discomfort, muscle tension SCM, traps            Assessment & Plan       ICD-10-CM    1. Strain of neck muscle, initial encounter S16.1XXA [facility] PT, HAND, AND CHIROPRACTIC REFERRAL   2. Moderate persistent asthma without complication J45.40 albuterol (PROVENTIL) (2.5 MG/3ML) 0.083% neb solution     fluticasone (ARNUITY ELLIPTA) 200 MCG/ACT inhaler   3. Moderate persistent asthma, unspecified whether complicated J45.40 albuterol (PROAIR HFA) 108 (90 Base) MCG/ACT inhaler   4. Other specified hypothyroidism E03.8 levothyroxine (SYNTHROID/LEVOTHROID) 112 MCG tablet     TSH   5. Gastroesophageal reflux disease without esophagitis K21.9 omeprazole (PRILOSEC) 40 MG DR capsule        BMI:   Estimated body mass index is 48.58 kg/m  as calculated from the following:    Height as of this encounter: 1.676 m (5' 6\").    Weight as of this encounter: 136.5 kg (301 lb).           PT ordered, f/u if not improving    Edna Perry NP  [facility]        "

## 2020-02-26 NOTE — TELEPHONE ENCOUNTER
This medication requires a prior auth ph: [phone] or change in medication.. not cov by insurance..

## 2020-02-26 NOTE — NURSING NOTE
"/88   Pulse 89   Temp 98  F (36.7  C) (Oral)   Resp 20   Ht 1.676 m (5' 6\")   Wt 136.5 kg (301 lb)   LMP 01/25/2020   SpO2 99%   BMI 48.58 kg/m    Patient in for follow up on thyroid and asthma.    "

## 2020-02-27 ENCOUNTER — TELEPHONE (OUTPATIENT)
Dept: INTERNAL MEDICINE | Facility: CLINIC | Age: 40
End: 2020-02-27

## 2020-02-27 DIAGNOSIS — E03.8 OTHER SPECIFIED HYPOTHYROIDISM: Primary | ICD-10-CM

## 2020-02-27 LAB — TSH SERPL DL<=0.005 MIU/L-ACNC: 6.03 MU/L (ref 0.4–4)

## 2020-02-27 RX ORDER — FLUTICASONE PROPIONATE 220 UG/1
1 AEROSOL, METERED RESPIRATORY (INHALATION) 2 TIMES DAILY
Qty: 1 INHALER | Refills: 3 | Status: SHIPPED | OUTPATIENT
Start: 2020-02-27 | End: 2020-07-21

## 2020-02-27 RX ORDER — LEVOTHYROXINE SODIUM 125 UG/1
125 TABLET ORAL DAILY
Qty: 90 TABLET | Refills: 0 | Status: SHIPPED | OUTPATIENT
Start: 2020-02-27 | End: 2020-07-08

## 2020-02-27 NOTE — TELEPHONE ENCOUNTER
Pts Arnuity Ellipta inhaler is not covered.     Please fax in alternative.     Looks like Flovent inhaler might be covered. She used this in 2016. Then was changed to Qvar because of formulary issues.   Then Qvar wasn't covered and Ellipta was, now is no longer.

## 2020-03-06 ENCOUNTER — THERAPY VISIT (OUTPATIENT)
Dept: PHYSICAL THERAPY | Facility: CLINIC | Age: 40
End: 2020-03-06
Attending: NURSE PRACTITIONER
Payer: COMMERCIAL

## 2020-03-06 DIAGNOSIS — S16.1XXA STRAIN OF NECK MUSCLE, INITIAL ENCOUNTER: ICD-10-CM

## 2020-03-06 DIAGNOSIS — M54.2 NECK PAIN: ICD-10-CM

## 2020-03-06 PROCEDURE — 97140 MANUAL THERAPY 1/> REGIONS: CPT | Mod: GP | Performed by: PHYSICAL THERAPIST

## 2020-03-06 PROCEDURE — 97110 THERAPEUTIC EXERCISES: CPT | Mod: GP | Performed by: PHYSICAL THERAPIST

## 2020-03-06 PROCEDURE — T1013 SIGN LANG/ORAL INTERPRETER: HCPCS | Mod: U3 | Performed by: PHYSICAL THERAPIST

## 2020-03-06 PROCEDURE — 97161 PT EVAL LOW COMPLEX 20 MIN: CPT | Mod: GP | Performed by: PHYSICAL THERAPIST

## 2020-03-06 NOTE — PROGRESS NOTES
"  Physical Therapy Initial Evaluation    Precautions/Restrictions/MD instructions: PT eval and treat.       Subjective History:    Injury/Condition Details:  Presenting Complaint Gabi presents with B) shoulder and neck pain, ongoing for ~2 years.  present for today's session.   Onset Timing/Date MD referral 2/26/20   Mechanism Not sure      Symptom Behavior Details   Primary Pain Symptoms Location: B) neck and shoulders, down into middle back  Quality: aching   Frequency: constant   Worst Pain 7/10   Best Pain 6/10   Symptom Provocators Sitting, watching TV, walking, lifting   Symptom Relievers nothing   Time of day dependent? no   Symptom change since onset? worsening      Prior Testing/Intervention for current condition:  Prior Tests none   Prior Treatment Tylenol - didn't help      Lifestyle & General Medical History  General Health Reported by Patient good   Employment/Activities Does not work   Pertinent medical/surgical history Overweight, asthma, thyroid issues   Patient Goals Reduce pain     CERVICAL:    Posture: forward head, rounded shoulders, mild kyphosis with \"buffalo hump\"    Neurological:    Motor Deficit:  Myotomes L R   C4 (shoulder elevation) 5/5 5/5   C5 (shoulder abduction) 5/5 5/5   C6 (elbow flexion) 5/5 5/5   C7 (elbow extension) 5/5 5/5   C8 (thumb extension) 5/5 5/5   T1 (finger add/abd) 5/5 5/5     AROM:   Flexion: to chest, mild stretch  Extension: WNL, pain down neck/back  Left side bend: 80%, tight on R  Right side bend:80%, tight on L  Left rotation: 90%, pain  Right rotation: 90%, pain  Retraction: WNL  Protraction: restricted, significant stretch noted    Other Tests:  Flexibility: restricted B) upper trapezius, levator  Palpation: tender to palpation B) UT, levator, cervical paraspinals  Segmental Mobility: mild hypomobility, tenderness noted with PAs  Static Tests: spurling's negative    Patient is a 40 year old female with cervical complaints.    Patient has the " following significant findings with corresponding treatment plan.                Diagnosis 1:  Neck pain  Pain -  manual therapy, self management, education and home program  Decreased ROM/flexibility - manual therapy and therapeutic exercise  Decreased joint mobility - manual therapy and therapeutic exercise  Decreased strength - therapeutic exercise and therapeutic activities  Impaired muscle performance - neuro re-education  Decreased function - therapeutic activities    Therapy Evaluation Codes:   1) History comprised of:   Personal factors that impact the plan of care:      Language.    Comorbidity factors that impact the plan of care are:      Overweight.     Medications impacting care: None.  2) Examination of Body Systems comprised of:   Body structures and functions that impact the plan of care:      Cervical spine.   Activity limitations that impact the plan of care are:      Bending, Lifting and Sitting.  3) Clinical presentation characteristics are:   Stable/Uncomplicated.  4) Decision-Making    Low complexity using standardized patient assessment instrument and/or measureable assessment of functional outcome.  Cumulative Therapy Evaluation is: Low complexity.    Previous and current functional limitations:  (See Goal Flow Sheet for this information)    Short term and Long term goals: (See Goal Flow Sheet for this information)     Communication ability:  Patient appears to be able to clearly communicate and understand verbal and written communication and follow directions correctly.  Patient has an  for communication clarity.  Treatment Explanation - The following has been discussed with the patient:   RX ordered/plan of care  Anticipated outcomes  Possible risks and side effects  This patient would benefit from PT intervention to resume normal activities.   Rehab potential is good.    Frequency:  1 X week, once daily  Duration:  for 6 weeks  Discharge Plan:  Achieve all LTG.  Independent in  home treatment program.  Reach maximal therapeutic benefit.    Please refer to the daily flowsheet for treatment today, total treatment time and time spent performing 1:1 timed codes.

## 2020-04-01 DIAGNOSIS — J45.40 MODERATE PERSISTENT ASTHMA WITHOUT COMPLICATION: ICD-10-CM

## 2020-04-01 NOTE — TELEPHONE ENCOUNTER
"Requested Prescriptions   Pending Prescriptions Disp Refills     albuterol (PROVENTIL) (2.5 MG/3ML) 0.083% neb solution 30 vial 0     Sig: Take 1 vial (2.5 mg) by nebulization every 6 hours as needed for shortness of breath / dyspnea or wheezing       Asthma Maintenance Inhalers - Anticholinergics Failed - 4/1/2020  3:49 PM        Failed - Asthma control assessment score within normal limits in last 6 months     Please review ACT score.           Passed - Patient is age 12 years or older        Passed - Medication is active on med list        Passed - Recent (6 mo) or future (30 days) visit within the authorizing provider's specialty     Patient had office visit in the last 6 months or has a visit in the next 30 days with authorizing provider or within the authorizing provider's specialty.  See \"Patient Info\" tab in inbasket, or \"Choose Columns\" in Meds & Orders section of the refill encounter.           Short-Acting Beta Agonist Inhalers Protocol  Failed - 4/1/2020  3:49 PM        Failed - Asthma control assessment score within normal limits in last 6 months     Please review ACT score.           Passed - Patient is age 12 or older        Passed - Medication is active on med list        Passed - Recent (6 mo) or future (30 days) visit within the authorizing provider's specialty     Patient had office visit in the last 6 months or has a visit in the next 30 days with authorizing provider or within the authorizing provider's specialty.  See \"Patient Info\" tab in inbasket, or \"Choose Columns\" in Meds & Orders section of the refill encounter.               Last Written Prescription Date:  2/26/20  Last Fill Quantity: 30,  # refills: 0   Last office visit: 2/26/2020 with prescribing provider:  2/26/20   Future Office Visit:      "

## 2020-04-02 RX ORDER — ALBUTEROL SULFATE 0.83 MG/ML
2.5 SOLUTION RESPIRATORY (INHALATION) EVERY 6 HOURS PRN
Qty: 30 VIAL | Refills: 0 | Status: SHIPPED | OUTPATIENT
Start: 2020-04-02 | End: 2020-07-13

## 2020-04-02 NOTE — TELEPHONE ENCOUNTER
I have not seen this patient since since 2018, last seen by Edna Perry recently, please forward to Edna Perry

## 2020-04-02 NOTE — TELEPHONE ENCOUNTER
Routing refill request to provider for review/approval because:  ACT out of protocol range    Last ACT score was 13 on 2/20/20.    Last refilled on 2/26/20 for 30 vials.    Last office visit was was with Edna on 2/26/20.  Patient instructed to return in about 3 months

## 2020-04-10 ENCOUNTER — TELEPHONE (OUTPATIENT)
Dept: PHYSICAL THERAPY | Facility: CLINIC | Age: 40
End: 2020-04-10

## 2020-04-10 DIAGNOSIS — M54.2 NECK PAIN: ICD-10-CM

## 2020-04-10 NOTE — TELEPHONE ENCOUNTER
Patient called with use of [place] . Her neck is feeling better and she is doing her exercises which help. She declines virtual care and would like to be called when the clinic reopens.

## 2020-07-01 DIAGNOSIS — E03.8 OTHER SPECIFIED HYPOTHYROIDISM: ICD-10-CM

## 2020-07-01 RX ORDER — LEVOTHYROXINE SODIUM 112 UG/1
112 TABLET ORAL DAILY
Qty: 60 TABLET | Refills: 1 | OUTPATIENT
Start: 2020-07-01

## 2020-07-01 NOTE — TELEPHONE ENCOUNTER
".  Requested Prescriptions   Pending Prescriptions Disp Refills     levothyroxine (SYNTHROID/LEVOTHROID) 112 MCG tablet 60 tablet 1     Sig: Take 1 tablet (112 mcg) by mouth daily   Last Written Prescription Date:  2-  Last Fill Quantity: 90,  # refills: 0   Last office visit: 2/26/2020 with prescribing provider:  jonathan   Future Office Visit:              Thyroid Protocol Failed - 7/1/2020 11:27 AM        Failed - Normal TSH on file in past 12 months     Recent Labs   Lab Test 02/26/20  1404   TSH 6.03*              Passed - Patient is 12 years or older        Passed - Recent (12 mo) or future (30 days) visit within the authorizing provider's specialty     Patient has had an office visit with the authorizing provider or a provider within the authorizing providers department within the previous 12 mos or has a future within next 30 days. See \"Patient Info\" tab in inbasket, or \"Choose Columns\" in Meds & Orders section of the refill encounter.              Passed - Medication is active on med list        Passed - No active pregnancy on record     If patient is pregnant or has had a positive pregnancy test, please check TSH.          Passed - No positive pregnancy test in past 12 months     If patient is pregnant or has had a positive pregnancy test, please check TSH.               "

## 2020-07-01 NOTE — TELEPHONE ENCOUNTER
Medication refused, this dose( 112 mcq) was increased on 2/27/20 to 125 mcq. t'd up the refill for the correct dose of levothyroxine.     Please see TE from 2/27/20    Called patient via [employer]  and patient requested a call back after 4 tomorrow.  Please provide a one time fill after appointment scheduled.  Patient has 3 pills left of the 125 mcq.  thanks    Patient stated she has been taking the levothyroxine 125 mcq tablets.  Patient thinks the dosage is too high.

## 2020-07-01 NOTE — TELEPHONE ENCOUNTER
I have not seen this patient since 2018, patient has been seeing Edna Perry and also last TSH was abnormal.  Patient needs to video visit with me or Edna Perry and also needs labs before the refill

## 2020-07-03 NOTE — TELEPHONE ENCOUNTER
Called patient via [facility]  and left message to call the clinic back.

## 2020-07-07 NOTE — TELEPHONE ENCOUNTER
Called patient via [employer]  and assisted in scheduling a non-fasting lab only appointment and a follow up appointment with primary care provider.     Can medication be refilled until her appointment?

## 2020-07-08 ENCOUNTER — DOCUMENTATION ONLY (OUTPATIENT)
Dept: LAB | Facility: CLINIC | Age: 40
End: 2020-07-08

## 2020-07-08 ENCOUNTER — APPOINTMENT (OUTPATIENT)
Dept: INTERPRETER SERVICES | Facility: CLINIC | Age: 40
End: 2020-07-08
Payer: COMMERCIAL

## 2020-07-08 DIAGNOSIS — E03.8 OTHER SPECIFIED HYPOTHYROIDISM: Primary | ICD-10-CM

## 2020-07-08 RX ORDER — LEVOTHYROXINE SODIUM 125 UG/1
125 TABLET ORAL DAILY
Qty: 15 TABLET | Refills: 0 | Status: SHIPPED | OUTPATIENT
Start: 2020-07-08 | End: 2020-07-21 | Stop reason: DRUGHIGH

## 2020-07-08 NOTE — TELEPHONE ENCOUNTER
Called patient via [facility]  and informed her of the message from the doctor.  Patient verbalized understanding.

## 2020-07-09 DIAGNOSIS — E03.8 OTHER SPECIFIED HYPOTHYROIDISM: ICD-10-CM

## 2020-07-09 PROCEDURE — 36415 COLL VENOUS BLD VENIPUNCTURE: CPT | Performed by: INTERNAL MEDICINE

## 2020-07-09 PROCEDURE — 84439 ASSAY OF FREE THYROXINE: CPT | Performed by: INTERNAL MEDICINE

## 2020-07-09 PROCEDURE — 84443 ASSAY THYROID STIM HORMONE: CPT | Performed by: INTERNAL MEDICINE

## 2020-07-10 LAB
T4 FREE SERPL-MCNC: 1.01 NG/DL (ref 0.76–1.46)
TSH SERPL DL<=0.005 MIU/L-ACNC: 24.25 MU/L (ref 0.4–4)

## 2020-07-13 DIAGNOSIS — J45.40 MODERATE PERSISTENT ASTHMA, UNSPECIFIED WHETHER COMPLICATED: Primary | ICD-10-CM

## 2020-07-13 DIAGNOSIS — J45.40 MODERATE PERSISTENT ASTHMA WITHOUT COMPLICATION: ICD-10-CM

## 2020-07-13 RX ORDER — ALBUTEROL SULFATE 90 UG/1
2 AEROSOL, METERED RESPIRATORY (INHALATION) EVERY 4 HOURS PRN
Qty: 1 INHALER | Refills: 1 | Status: SHIPPED | OUTPATIENT
Start: 2020-07-13 | End: 2020-07-14

## 2020-07-13 RX ORDER — ALBUTEROL SULFATE 0.83 MG/ML
2.5 SOLUTION RESPIRATORY (INHALATION) EVERY 6 HOURS PRN
Qty: 30 VIAL | Refills: 0 | Status: SHIPPED | OUTPATIENT
Start: 2020-07-13 | End: 2020-07-21

## 2020-07-14 RX ORDER — ALBUTEROL SULFATE 90 UG/1
2 AEROSOL, METERED RESPIRATORY (INHALATION) EVERY 4 HOURS PRN
Qty: 1 INHALER | Refills: 1 | Status: SHIPPED | OUTPATIENT
Start: 2020-07-14 | End: 2020-07-21

## 2020-07-21 ENCOUNTER — APPOINTMENT (OUTPATIENT)
Dept: INTERPRETER SERVICES | Facility: CLINIC | Age: 40
End: 2020-07-21
Payer: COMMERCIAL

## 2020-07-21 ENCOUNTER — VIRTUAL VISIT (OUTPATIENT)
Dept: INTERNAL MEDICINE | Facility: CLINIC | Age: 40
End: 2020-07-21
Payer: COMMERCIAL

## 2020-07-21 DIAGNOSIS — E03.8 OTHER SPECIFIED HYPOTHYROIDISM: Primary | ICD-10-CM

## 2020-07-21 DIAGNOSIS — J45.40 MODERATE PERSISTENT ASTHMA WITHOUT COMPLICATION: ICD-10-CM

## 2020-07-21 PROCEDURE — 99214 OFFICE O/P EST MOD 30 MIN: CPT | Mod: 95 | Performed by: INTERNAL MEDICINE

## 2020-07-21 RX ORDER — ALBUTEROL SULFATE 90 UG/1
2 AEROSOL, METERED RESPIRATORY (INHALATION) EVERY 4 HOURS PRN
Qty: 1 INHALER | Refills: 1 | Status: SHIPPED | OUTPATIENT
Start: 2020-07-21 | End: 2020-10-02

## 2020-07-21 RX ORDER — ALBUTEROL SULFATE 0.83 MG/ML
2.5 SOLUTION RESPIRATORY (INHALATION) EVERY 6 HOURS PRN
Qty: 30 VIAL | Refills: 0 | Status: SHIPPED | OUTPATIENT
Start: 2020-07-21 | End: 2020-10-20

## 2020-07-21 RX ORDER — LEVOTHYROXINE SODIUM 137 UG/1
137 TABLET ORAL DAILY
Qty: 60 TABLET | Refills: 0 | Status: SHIPPED | OUTPATIENT
Start: 2020-07-21 | End: 2020-10-02

## 2020-07-21 RX ORDER — FLUTICASONE PROPIONATE 220 UG/1
1 AEROSOL, METERED RESPIRATORY (INHALATION) 2 TIMES DAILY
Qty: 1 INHALER | Refills: 3 | Status: SHIPPED | OUTPATIENT
Start: 2020-07-21 | End: 2020-10-02

## 2020-07-21 NOTE — PROGRESS NOTES
"Gabi Roblero is a 40 year old female who is being evaluated via a billable telephone visit.      The patient has been notified of following:     \"This telephone visit will be conducted via a call between you and your physician/provider. We have found that certain health care needs can be provided without the need for a physical exam.  This service lets us provide the care you need with a short phone conversation.  If a prescription is necessary we can send it directly to your pharmacy.  If lab work is needed we can place an order for that and you can then stop by our lab to have the test done at a later time.    Telephone visits are billed at different rates depending on your insurance coverage. During this emergency period, for some insurers they may be billed the same as an in-person visit.  Please reach out to your insurance provider with any questions.    If during the course of the call the physician/provider feels a telephone visit is not appropriate, you will not be charged for this service.\"    Patient has given verbal consent for Telephone visit?  Yes    What phone number would you like to be contacted at? [phone]    How would you like to obtain your AVS? Mail a copy      Phone 4 pm -4/;14 pm     Subjective     Gabi Roblero is a 40 year old female who presents via phone visit today for the following health issues:    HPI      Hypothyroidism Follow-up      Since last visit, patient describes the following symptoms:has  Weight gain , no hair loss, no skin changes, no constipation, no loose stools   on levoxyl 125 mcg daily., las t TSH 24.25 on 07/09/20     Asthma Follow up; pt says her asthma is not under good control lately , has been out of Flovent .pt says she is using more frequent albuterol inhaler recently and requestign refills on inhalers, and nebs.     Patient Active Problem List   Diagnosis     Gastritis     CARDIOVASCULAR SCREENING; LDL GOAL LESS THAN 160     Vitamin D deficiency "     GERD (gastroesophageal reflux disease)     Rotator cuff syndrome     Moderate persistent asthma     Other specified hypothyroidism     Morbid obesity (H)     Hypothyroidism     Strain of neck muscle, initial encounter     Neck pain     Past Surgical History:   Procedure Laterality Date     CHOLECYSTECTOMY  2003    open, in [place]       Social History     Tobacco Use     Smoking status: Never Smoker     Smokeless tobacco: Never Used   Substance Use Topics     Alcohol use: No     Alcohol/week: 0.0 standard drinks     Family History   Problem Relation Age of Onset     Diabetes Father      Family History Negative Mother      Cancer No family hx of      Cerebrovascular Disease No family hx of      Hypertension No family hx of      C.A.D. No family hx of      Asthma No family hx of          Current Outpatient Medications   Medication Sig Dispense Refill     acetaminophen (TYLENOL) 325 MG tablet Take 2 tablets (650 mg) by mouth every 4 hours as needed for mild pain or fever (greater than or equal to 38  C /100.4  F (oral) or 38.5  C/ 101.4  F (core).) 100 tablet 1     albuterol (PROAIR HFA) 108 (90 Base) MCG/ACT inhaler Inhale 2 puffs into the lungs every 4 hours as needed for shortness of breath / dyspnea 1 Inhaler 1     albuterol (PROVENTIL) (2.5 MG/3ML) 0.083% neb solution Take 1 vial (2.5 mg) by nebulization every 6 hours as needed for shortness of breath / dyspnea or wheezing 30 vial 0     fluticasone (FLOVENT HFA) 220 MCG/ACT inhaler Inhale 1 puff into the lungs 2 times daily 1 Inhaler 3     levothyroxine (SYNTHROID/LEVOTHROID) 137 MCG tablet Take 1 tablet (137 mcg) by mouth daily 60 tablet 0     omeprazole (PRILOSEC) 40 MG DR capsule Take 1 capsule (40 mg) by mouth daily 90 capsule 3     order for DME Equipment being ordered: Nebulizer 1 Device 0       Reviewed and updated as needed this visit by Provider         Review of Systems   CONSTITUTIONAL: NEGATIVE for fever, chills, change in weight  ENT/MOUTH: NEGATIVE  for ear, mouth and throat problems  RESP: NEGATIVE for significant cough or SOB  CV: NEGATIVE for chest pain, palpitations or peripheral edema  ENDOCRINE: Hx thyroid disease       Objective   Reported vitals:  There were no vitals taken for this visit.   healthy, alert and no distress  PSYCH: Alert and oriented times 3; coherent speech, normal   rate and volume, able to articulate logical thoughts, able   to abstract reason, no tangential thoughts, no hallucinations   or delusions  Her affect is normal  RESP: No cough, no audible wheezing, able to talk in full sentences  Remainder of exam unable to be completed due to telephone visits    Diagnostic Test Results:  Labs reviewed in Epic        Assessment/Plan:     (E03.8) Other specified hypothyroidism    Comment: elevated TSH  Plan:increase  levothyroxine (SYNTHROID/LEVOTHROID) to 137 MCG         tablet, as directed.explained clearly about the medication,insructions and side effects. Rpt TSH with free T4 reflex in 4-6 wks.            (J45.40) Moderate persistent asthma without complication  Plan:refileld  fluticasone (FLOVENT HFA) 220 MCG/ACT inhaler,         albuterol (PROAIR HFA) 108 (90 Base) MCG/ACT         inhaler, albuterol (PROVENTIL) (2.5 MG/3ML)         0.083% neb solution to use as directed.explained clearly about the medication,insructions and side effects.   advised to f/u in 2-3 wks or earlier prn              Phone call duration:  14 minutes    Dhruv Andino MD

## 2020-08-25 PROBLEM — M54.2 NECK PAIN: Status: RESOLVED | Noted: 2020-03-06 | Resolved: 2020-03-06

## 2020-10-01 ENCOUNTER — NURSE TRIAGE (OUTPATIENT)
Dept: INTERNAL MEDICINE | Facility: CLINIC | Age: 40
End: 2020-10-01

## 2020-10-01 NOTE — TELEPHONE ENCOUNTER
"Per patient with assistance of  has been having upper abdominal pain. Patient has abdominal pain for 2 months with increasing severity. Patient has lots of gas and some constipation. Last bowel movement was 3 days ago. Abdominal pain is constant and sharp when patient eats anything. Patient denies fever, diarrhea, does get nausea. Advised per protocol.      Additional Information    Negative: Abdomen pain is the main symptom and adult male    Negative: Abdomen pain is the main symptom and adult female    Negative: Rectal bleeding or blood in stool is the main symptom    Negative: Patient sounds very sick or weak to the triager    Negative: Constant abdominal pain lasting > 2 hours    Negative: Vomiting bile (green color)    Negative: Vomiting and abdomen looks much more swollen than usual    Mild constipation    Answer Assessment - Initial Assessment Questions  1. LOCATION: \"Where does it hurt?\"       Upper abdominal pain   2. RADIATION: \"Does the pain shoot anywhere else?\" (e.g., chest, back)      no  3. ONSET: \"When did the pain begin?\" (e.g., minutes, hours or days ago)       Last 2 months   4. SUDDEN: \"Gradual or sudden onset?\"      Gradual   5. PATTERN \"Does the pain come and go, or is it constant?\"     - If constant: \"Is it getting better, staying the same, or worsening?\"       (Note: Constant means the pain never goes away completely; most serious pain is constant and it progresses)      - If intermittent: \"How long does it last?\" \"Do you have pain now?\"      (Note: Intermittent means the pain goes away completely between bouts)      Bloating, pain and nausea, when she eats  6. SEVERITY: \"How bad is the pain?\"  (e.g., Scale 1-10; mild, moderate, or severe)    - MILD (1-3): doesn't interfere with normal activities, abdomen soft and not tender to touch     - MODERATE (4-7): interferes with normal activities or awakens from sleep, tender to touch     - SEVERE (8-10): excruciating pain, doubled over, " "unable to do any normal activities       Moderate   7. RECURRENT SYMPTOM: \"Have you ever had this type of abdominal pain before?\" If so, ask: \"When was the last time?\" and \"What happened that time?\"       no  8. CAUSE: \"What do you think is causing the abdominal pain?\"      no  9. RELIEVING/AGGRAVATING FACTORS: \"What makes it better or worse?\" (e.g., movement, antacids, bowel movement)      Eating makes it worse   10. OTHER SYMPTOMS: \"Has there been any vomiting, diarrhea, constipation, or urine problems?\"        Nausea, constipation   11. PREGNANCY: \"Is there any chance you are pregnant?\" \"When was your last menstrual period?\"        No    Protocols used: CONSTIPATION-A-OH, ABDOMINAL PAIN - FEMALE-A-OH      "

## 2020-10-02 ENCOUNTER — ANCILLARY PROCEDURE (OUTPATIENT)
Dept: GENERAL RADIOLOGY | Facility: CLINIC | Age: 40
End: 2020-10-02
Attending: INTERNAL MEDICINE
Payer: COMMERCIAL

## 2020-10-02 ENCOUNTER — OFFICE VISIT (OUTPATIENT)
Dept: INTERNAL MEDICINE | Facility: CLINIC | Age: 40
End: 2020-10-02
Payer: COMMERCIAL

## 2020-10-02 VITALS
DIASTOLIC BLOOD PRESSURE: 71 MMHG | OXYGEN SATURATION: 96 % | HEART RATE: 88 BPM | TEMPERATURE: 97.5 F | BODY MASS INDEX: 43.4 KG/M2 | SYSTOLIC BLOOD PRESSURE: 108 MMHG | HEIGHT: 69 IN | RESPIRATION RATE: 20 BRPM | WEIGHT: 293 LBS

## 2020-10-02 DIAGNOSIS — M79.672 LEFT FOOT PAIN: ICD-10-CM

## 2020-10-02 DIAGNOSIS — E03.8 OTHER SPECIFIED HYPOTHYROIDISM: ICD-10-CM

## 2020-10-02 DIAGNOSIS — J45.40 MODERATE PERSISTENT ASTHMA WITHOUT COMPLICATION: ICD-10-CM

## 2020-10-02 DIAGNOSIS — E66.09 OBESITY DUE TO EXCESS CALORIES, UNSPECIFIED CLASSIFICATION, UNSPECIFIED WHETHER SERIOUS COMORBIDITY PRESENT: ICD-10-CM

## 2020-10-02 DIAGNOSIS — R10.13 ABDOMINAL PAIN, EPIGASTRIC: Primary | ICD-10-CM

## 2020-10-02 LAB
ERYTHROCYTE [DISTWIDTH] IN BLOOD BY AUTOMATED COUNT: 14.8 % (ref 10–15)
HCT VFR BLD AUTO: 40.7 % (ref 35–47)
HGB BLD-MCNC: 13.1 G/DL (ref 11.7–15.7)
LIPASE SERPL-CCNC: 73 U/L (ref 73–393)
MCH RBC QN AUTO: 27.2 PG (ref 26.5–33)
MCHC RBC AUTO-ENTMCNC: 32.2 G/DL (ref 31.5–36.5)
MCV RBC AUTO: 85 FL (ref 78–100)
PLATELET # BLD AUTO: 262 10E9/L (ref 150–450)
RBC # BLD AUTO: 4.81 10E12/L (ref 3.8–5.2)
WBC # BLD AUTO: 5.8 10E9/L (ref 4–11)

## 2020-10-02 PROCEDURE — 73630 X-RAY EXAM OF FOOT: CPT | Mod: LT | Performed by: RADIOLOGY

## 2020-10-02 PROCEDURE — 83690 ASSAY OF LIPASE: CPT | Performed by: INTERNAL MEDICINE

## 2020-10-02 PROCEDURE — 84439 ASSAY OF FREE THYROXINE: CPT | Performed by: INTERNAL MEDICINE

## 2020-10-02 PROCEDURE — 85027 COMPLETE CBC AUTOMATED: CPT | Performed by: INTERNAL MEDICINE

## 2020-10-02 PROCEDURE — 84443 ASSAY THYROID STIM HORMONE: CPT | Performed by: INTERNAL MEDICINE

## 2020-10-02 PROCEDURE — 36415 COLL VENOUS BLD VENIPUNCTURE: CPT | Performed by: INTERNAL MEDICINE

## 2020-10-02 PROCEDURE — 80053 COMPREHEN METABOLIC PANEL: CPT | Performed by: INTERNAL MEDICINE

## 2020-10-02 PROCEDURE — 82150 ASSAY OF AMYLASE: CPT | Performed by: INTERNAL MEDICINE

## 2020-10-02 PROCEDURE — 99214 OFFICE O/P EST MOD 30 MIN: CPT | Performed by: INTERNAL MEDICINE

## 2020-10-02 RX ORDER — FLUTICASONE PROPIONATE 220 UG/1
1 AEROSOL, METERED RESPIRATORY (INHALATION) 2 TIMES DAILY
Qty: 1 INHALER | Refills: 3 | Status: SHIPPED | OUTPATIENT
Start: 2020-10-02 | End: 2020-11-10

## 2020-10-02 RX ORDER — LEVOTHYROXINE SODIUM 137 UG/1
137 TABLET ORAL DAILY
Qty: 90 TABLET | Refills: 3 | Status: SHIPPED | OUTPATIENT
Start: 2020-10-02 | End: 2020-12-01

## 2020-10-02 RX ORDER — ALBUTEROL SULFATE 90 UG/1
2 AEROSOL, METERED RESPIRATORY (INHALATION) EVERY 4 HOURS PRN
Qty: 1 INHALER | Refills: 3 | Status: SHIPPED | OUTPATIENT
Start: 2020-10-02 | End: 2020-11-10

## 2020-10-02 RX ORDER — LEVOTHYROXINE SODIUM 137 UG/1
137 TABLET ORAL DAILY
Qty: 60 TABLET | Refills: 0 | Status: CANCELLED | OUTPATIENT
Start: 2020-10-02

## 2020-10-02 ASSESSMENT — MIFFLIN-ST. JEOR: SCORE: 2118.76

## 2020-10-02 NOTE — PROGRESS NOTES
"Ani Roblero is a 40 year old female who presents to clinic today for the following health issues:    HPI         Presents with abdominal pain. For 9 months.   Pain is in the mid abdomen and epigastrium. On and off. Worse post eating. No nausea, vomiting, fever, change of bowel habits.   No blood in the stools.  Has been on omeprazole without help of symptoms.   Has gained weight. Wants to try losing, wants to see weight loss clinic.   Reports pain in the left foot, lateral surface and heel, with walking, pressing over the area. No injury. For 4 months.     Has h/o asthma, controlled on current steroid and bronchodilator inhalers.         Review of Systems   Constitutional, HEENT, cardiovascular, pulmonary, gi and gu systems are negative, except as otherwise noted.      Objective    /71 (BP Location: Right arm, Patient Position: Sitting, Cuff Size: Adult Regular)   Pulse 88   Temp 97.5  F (36.4  C) (Oral)   Resp 20   Ht 1.753 m (5' 9\")   Wt 138.4 kg (305 lb 3.2 oz)   SpO2 96%   BMI 45.07 kg/m    Body mass index is 45.07 kg/m .  Physical Exam   GENERAL: obese, alert and no distress  EYES: Eyes grossly normal to inspection, PERRL and conjunctivae and sclerae normal  NECK: no adenopathy, no asymmetry, masses, or scars and thyroid normal to palpation  RESP: lungs clear to auscultation - no rales, rhonchi or wheezes  CV: regular rate and rhythm, normal S1 S2, no S3 or S4, no murmur, click or rub, no peripheral edema and peripheral pulses strong  ABDOMEN: soft, obese, nontender, no hepatosplenomegaly, no masses and bowel sounds normal  Palpatory tender in the epigastrium, scar from prior open cholecystectomy   MS: no gross musculoskeletal defects noted, no edema  Left foot palpatory tender in the posterolateral area and bottom of the heel, skin xerosis and 5 mm oval scab present on the lateral foot  SKIN: no suspicious lesions or rashes    No results found for this or any previous visit " "(from the past 24 hour(s)).        Assessment & Plan   Problem List Items Addressed This Visit     Moderate persistent asthma    Relevant Medications    albuterol (PROAIR HFA) 108 (90 Base) MCG/ACT inhaler    fluticasone (FLOVENT HFA) 220 MCG/ACT inhaler    Other specified hypothyroidism    Relevant Medications    levothyroxine (SYNTHROID/LEVOTHROID) 137 MCG tablet    Other Relevant Orders    TSH with free T4 reflex (Completed)      Other Visit Diagnoses     Abdominal pain, epigastric    -  Primary    Relevant Orders    CBC with platelets (Completed)    Comprehensive metabolic panel (Completed)    Lipase (Completed)    Amylase (Completed)    Helicobacter pylori Antigen Stool    Left foot pain        Relevant Orders    XR Foot Left G/E 3 Views (Completed)    Obesity due to excess calories, unspecified classification, unspecified whether serious comorbidity present        Relevant Orders    COMPREHENSIVE WEIGHT MANAGEMENT             BMI:   Estimated body mass index is 45.07 kg/m  as calculated from the following:    Height as of this encounter: 1.753 m (5' 9\").    Weight as of this encounter: 138.4 kg (305 lb 3.2 oz).   Weight management plan: Patient referred to endocrine and/or weight management specialty Discussed healthy diet and exercise guidelines       assess lab work   Consider GI assessment , EGD  Refer for weight loss clinic   Assess foot X ray, consider podiatry referral , use foot inserts     See Patient Instructions  Return in about 4 weeks (around 10/30/2020) for follow up on acute problem if persists.    Niko Alfaro MD  [facility]    "

## 2020-10-02 NOTE — LETTER
[facility]  [address], [address]  [place] [address]  [phone]        October 7, 2020    Gabi ANNEL Roblero  [id number] [facility] [id number]            Dear Ms. Gabi Lewis Peterosalba:      The results of your recent labs showed Elevated TSH, normal FT4, recommend to recheck in 6 months.   If you have any further questions or problems, please contact our office.    Sincerely,        Niko Alfaro M.D.    Results for orders placed or performed in visit on 10/02/20   XR Foot Left G/E 3 Views     Status: None    Narrative    FOOT LEFT THREE OR MORE VIEWS  10/2/2020 3:32 PM     HISTORY: Left foot pain    COMPARISON: 5/1/2018.      Impression    IMPRESSION: No bony or soft tissue abnormality and no change.    MOHIT MOREJON MD   Results for orders placed or performed in visit on 10/02/20   CBC with platelets     Status: None   Result Value Ref Range    WBC 5.8 4.0 - 11.0 10e9/L    RBC Count 4.81 3.8 - 5.2 10e12/L    Hemoglobin 13.1 11.7 - 15.7 g/dL    Hematocrit 40.7 35.0 - 47.0 %    MCV 85 78 - 100 fl    MCH 27.2 26.5 - 33.0 pg    MCHC 32.2 31.5 - 36.5 g/dL    RDW 14.8 10.0 - 15.0 %    Platelet Count 262 150 - 450 10e9/L   Comprehensive metabolic panel     Status: Abnormal   Result Value Ref Range    Sodium 138 133 - 144 mmol/L    Potassium 3.9 3.4 - 5.3 mmol/L    Chloride 107 94 - 109 mmol/L    Carbon Dioxide 26 20 - 32 mmol/L    Anion Gap 5 3 - 14 mmol/L    Glucose 77 70 - 99 mg/dL    Urea Nitrogen 11 7 - 30 mg/dL    Creatinine 0.72 0.52 - 1.04 mg/dL    GFR Estimate >90 >60 mL/min/[1.73_m2]    GFR Estimate If Black >90 >60 mL/min/[1.73_m2]    Calcium 8.6 8.5 - 10.1 mg/dL    Bilirubin Total 0.3 0.2 - 1.3 mg/dL    Albumin 3.3 (L) 3.4 - 5.0 g/dL    Protein Total 7.7 6.8 - 8.8 g/dL    Alkaline Phosphatase 90 40 - 150 U/L    ALT 17 0 - 50 U/L    AST 12 0 - 45 U/L   Lipase     Status: None   Result Value Ref Range    Lipase 73 73 - 393 U/L   Amylase     Status: None   Result Value Ref  Range    Amylase 37 30 - 110 U/L   TSH with free T4 reflex     Status: Abnormal   Result Value Ref Range    TSH 9.44 (H) 0.40 - 4.00 mU/L   T4 free     Status: None   Result Value Ref Range    T4 Free 1.20 0.76 - 1.46 ng/dL

## 2020-10-02 NOTE — NURSING NOTE
"/71 (BP Location: Right arm, Patient Position: Sitting, Cuff Size: Adult Regular)   Pulse 88   Temp 97.5  F (36.4  C) (Oral)   Resp 20   Ht 1.753 m (5' 9\")   Wt 138.4 kg (305 lb 3.2 oz)   SpO2 96%   BMI 45.07 kg/m      "

## 2020-10-03 LAB
ALBUMIN SERPL-MCNC: 3.3 G/DL (ref 3.4–5)
ALP SERPL-CCNC: 90 U/L (ref 40–150)
ALT SERPL W P-5'-P-CCNC: 17 U/L (ref 0–50)
AMYLASE SERPL-CCNC: 37 U/L (ref 30–110)
ANION GAP SERPL CALCULATED.3IONS-SCNC: 5 MMOL/L (ref 3–14)
AST SERPL W P-5'-P-CCNC: 12 U/L (ref 0–45)
BILIRUB SERPL-MCNC: 0.3 MG/DL (ref 0.2–1.3)
BUN SERPL-MCNC: 11 MG/DL (ref 7–30)
CALCIUM SERPL-MCNC: 8.6 MG/DL (ref 8.5–10.1)
CHLORIDE SERPL-SCNC: 107 MMOL/L (ref 94–109)
CO2 SERPL-SCNC: 26 MMOL/L (ref 20–32)
CREAT SERPL-MCNC: 0.72 MG/DL (ref 0.52–1.04)
GFR SERPL CREATININE-BSD FRML MDRD: >90 ML/MIN/{1.73_M2}
GLUCOSE SERPL-MCNC: 77 MG/DL (ref 70–99)
POTASSIUM SERPL-SCNC: 3.9 MMOL/L (ref 3.4–5.3)
PROT SERPL-MCNC: 7.7 G/DL (ref 6.8–8.8)
SODIUM SERPL-SCNC: 138 MMOL/L (ref 133–144)
T4 FREE SERPL-MCNC: 1.2 NG/DL (ref 0.76–1.46)
TSH SERPL DL<=0.005 MIU/L-ACNC: 9.44 MU/L (ref 0.4–4)

## 2020-10-05 RX ORDER — ALBUTEROL SULFATE 90 UG/1
2 AEROSOL, METERED RESPIRATORY (INHALATION) EVERY 4 HOURS PRN
Qty: 1 INHALER | Refills: 1 | OUTPATIENT
Start: 2020-10-05

## 2020-10-08 DIAGNOSIS — R10.13 ABDOMINAL PAIN, EPIGASTRIC: ICD-10-CM

## 2020-10-08 PROCEDURE — 87338 HPYLORI STOOL AG IA: CPT | Performed by: INTERNAL MEDICINE

## 2020-10-12 LAB — H PYLORI AG STL QL IA: NEGATIVE

## 2020-10-16 ENCOUNTER — TELEPHONE (OUTPATIENT)
Dept: INTERNAL MEDICINE | Facility: CLINIC | Age: 40
End: 2020-10-16

## 2020-10-16 DIAGNOSIS — R10.13 EPIGASTRIC PAIN: Primary | ICD-10-CM

## 2020-10-16 DIAGNOSIS — J45.40 MODERATE PERSISTENT ASTHMA WITHOUT COMPLICATION: ICD-10-CM

## 2020-10-16 NOTE — LETTER
October 29, 2020      Gabi Roblero  [address] [facility] [address]              Dear Gabi,      We have received a refill request from your pharmacy for your medication. Many medications require routine follow-up with your provider and a review of your chart indicates that you are due for an appointment. We have sent a small refill to your pharmacy to allow you time to schedule an appointment.     Please call [phone] to schedule an appointment.  We look forward to seeing you in the near future.      Thank you,     [facility]

## 2020-10-19 NOTE — TELEPHONE ENCOUNTER
Or appointment?    (Per 10-2-2020 office notes:  Consider GI assessment , EGD  Refer for weight loss clinic   Return in about 4 weeks (around 10/30/2020) for follow up on acute problem if persists.   Niko Alfaro MD  [facility])

## 2020-10-19 NOTE — TELEPHONE ENCOUNTER
Attempted to contact pt. Left message to call clinic.     New referral done as the phone # on first referral was incorrect.

## 2020-10-20 RX ORDER — ALBUTEROL SULFATE 0.83 MG/ML
2.5 SOLUTION RESPIRATORY (INHALATION) EVERY 6 HOURS PRN
Qty: 10 VIAL | Refills: 0 | Status: SHIPPED | OUTPATIENT
Start: 2020-10-20 | End: 2020-11-17

## 2020-10-20 RX ORDER — ALBUTEROL SULFATE 90 UG/1
2 AEROSOL, METERED RESPIRATORY (INHALATION) EVERY 4 HOURS PRN
Qty: 1 INHALER | Refills: 3 | OUTPATIENT
Start: 2020-10-20

## 2020-10-20 NOTE — TELEPHONE ENCOUNTER
Last ASTHMA CONTROL TEST score 2/26/20 was 13. Patient was just seen 10/2/20. Patient had virtual visit and asthma was addressed 7/21/20 but no ASTHMA CONTROL TEST was done.     Routing refill request to provider for review/approval because:  Failed protocol

## 2020-10-21 ENCOUNTER — APPOINTMENT (OUTPATIENT)
Dept: INTERPRETER SERVICES | Facility: CLINIC | Age: 40
End: 2020-10-21
Payer: COMMERCIAL

## 2020-10-21 NOTE — TELEPHONE ENCOUNTER
Patient's home/cell number message on machine to call back via [employer] [phone].

## 2020-11-06 DIAGNOSIS — J45.40 MODERATE PERSISTENT ASTHMA WITHOUT COMPLICATION: ICD-10-CM

## 2020-11-06 NOTE — LETTER
November 10, 2020      Gabi Roblero  [address] [facility] [address]              Dear Gabi,      We have received a refill request from your pharmacy for your medication. Many medications require routine follow-up with your provider and a review of your chart indicates that you are due to complete the form included with this letter for asthma. We have sent a one time refill to your pharmacy to allow you time to review this form and call back or mail back the answers.      Thank you,     [facility]

## 2020-11-10 RX ORDER — FLUTICASONE PROPIONATE 220 UG/1
1 AEROSOL, METERED RESPIRATORY (INHALATION) 2 TIMES DAILY
Qty: 1 INHALER | Refills: 0 | Status: SHIPPED | OUTPATIENT
Start: 2020-11-10 | End: 2020-12-01

## 2020-11-10 RX ORDER — ALBUTEROL SULFATE 90 UG/1
2 AEROSOL, METERED RESPIRATORY (INHALATION) EVERY 6 HOURS PRN
Qty: 1 INHALER | Refills: 0 | Status: SHIPPED | OUTPATIENT
Start: 2020-11-10 | End: 2021-03-18

## 2020-11-10 NOTE — TELEPHONE ENCOUNTER
Last ACT abnormal, patient needs video visit, 1 refill done on both inhalers please inform patient  
Mailed ACT to patient.  
Routing refill request to provider for review/approval because:  Labs out of range:    ACT Total Scores 4/29/2016 5/1/2018 2/26/2020   ACT TOTAL SCORE - - -   ASTHMA ER VISITS - - -   ASTHMA HOSPITALIZATIONS - - -   ACT TOTAL SCORE (Goal Greater than or Equal to 20) 7 21 13   In the past 12 months, how many times did you visit the emergency room for your asthma without being admitted to the hospital? 0 0 -   In the past 12 months, how many times were you hospitalized overnight because of your asthma? 0 0 -     Clay Sims RN, BSN          
22.04

## 2020-11-13 DIAGNOSIS — J45.40 MODERATE PERSISTENT ASTHMA WITHOUT COMPLICATION: ICD-10-CM

## 2020-11-14 NOTE — TELEPHONE ENCOUNTER
This was refilled just last month. Pt needs ACT , her asthma was not under control. Please advise virtual visit with any provider

## 2020-11-17 RX ORDER — ALBUTEROL SULFATE 0.83 MG/ML
2.5 SOLUTION RESPIRATORY (INHALATION) EVERY 6 HOURS PRN
Qty: 10 VIAL | Refills: 0 | Status: SHIPPED | OUTPATIENT
Start: 2020-11-17 | End: 2021-09-08

## 2020-11-17 NOTE — TELEPHONE ENCOUNTER
We received a response today 11-17-20 for the albuterol inhaler with a written date of 11-10-20 but we have that one already, we needed a refill for the nebulizer solution. Please resend.    Thank you,  Sandrita Miranda [facility]  [phone]

## 2020-11-17 NOTE — TELEPHONE ENCOUNTER
Patient has appoint with provider 12/15/20 and with Dr Alfaro on 12/1/20 --- ---- provider the medication is for a Nebulizing solution .       ESEQUIEL Simpson LPN

## 2020-12-01 ENCOUNTER — ANCILLARY PROCEDURE (OUTPATIENT)
Dept: GENERAL RADIOLOGY | Facility: CLINIC | Age: 40
End: 2020-12-01
Attending: INTERNAL MEDICINE
Payer: COMMERCIAL

## 2020-12-01 ENCOUNTER — OFFICE VISIT (OUTPATIENT)
Dept: INTERNAL MEDICINE | Facility: CLINIC | Age: 40
End: 2020-12-01
Payer: COMMERCIAL

## 2020-12-01 VITALS
BODY MASS INDEX: 45.34 KG/M2 | WEIGHT: 293 LBS | RESPIRATION RATE: 20 BRPM | HEART RATE: 94 BPM | OXYGEN SATURATION: 96 % | SYSTOLIC BLOOD PRESSURE: 110 MMHG | TEMPERATURE: 98.6 F | DIASTOLIC BLOOD PRESSURE: 70 MMHG

## 2020-12-01 DIAGNOSIS — M25.562 LEFT KNEE PAIN, UNSPECIFIED CHRONICITY: ICD-10-CM

## 2020-12-01 DIAGNOSIS — M54.42 LEFT-SIDED LOW BACK PAIN WITH LEFT-SIDED SCIATICA, UNSPECIFIED CHRONICITY: ICD-10-CM

## 2020-12-01 DIAGNOSIS — E66.01 MORBID OBESITY (H): ICD-10-CM

## 2020-12-01 DIAGNOSIS — J45.40 MODERATE PERSISTENT ASTHMA WITHOUT COMPLICATION: ICD-10-CM

## 2020-12-01 DIAGNOSIS — E03.8 OTHER SPECIFIED HYPOTHYROIDISM: ICD-10-CM

## 2020-12-01 DIAGNOSIS — M25.562 LEFT KNEE PAIN, UNSPECIFIED CHRONICITY: Primary | ICD-10-CM

## 2020-12-01 PROCEDURE — 73562 X-RAY EXAM OF KNEE 3: CPT | Mod: LT | Performed by: RADIOLOGY

## 2020-12-01 PROCEDURE — 99214 OFFICE O/P EST MOD 30 MIN: CPT | Performed by: INTERNAL MEDICINE

## 2020-12-01 RX ORDER — LEVOTHYROXINE SODIUM 137 UG/1
137 TABLET ORAL DAILY
Qty: 90 TABLET | Refills: 3 | Status: SHIPPED | OUTPATIENT
Start: 2020-12-01 | End: 2020-12-17 | Stop reason: DRUGHIGH

## 2020-12-01 NOTE — PROGRESS NOTES
Subjective     Gabi Roblero is a 40 year old female who presents to clinic today for the following health issues:    HPI    video interpretor used      Asthma and left knee pain   Patient is seen for a follow up visit.  Has history of hypothyroidism. On replacement treatment with Synthroid. No heat /cold intolerance, heart palpitations, weight loss/ gain ,  change in bowel habits.  Has had abdominal pain , improved with PPI , normal lab work.   Concern for left knee pain, with ambulation. No injury.   Has had pain in the left buttock area with radiation to the thigh.   No weakness or numbness.   Has h/o asthma, not well controlled. Has SOB, wheezing, no hospital visits.   Has h/o obesity. Has not see weight loss clinic yet.       Review of Systems   Constitutional, HEENT, cardiovascular, pulmonary, gi and gu systems are negative, except as otherwise noted.      Objective    /70   Pulse 94   Temp 98.6  F (37  C) (Oral)   Resp 20   Wt 139.3 kg (307 lb)   LMP 11/28/2020   SpO2 96%   BMI 45.34 kg/m    Body mass index is 45.34 kg/m .  Physical Exam   GENERAL: obese, alert and no distress  NECK: no adenopathy, no asymmetry, masses, or scars and thyroid normal to palpation  RESP: lungs clear to auscultation - no rales, rhonchi or wheezes  CV: regular rate and rhythm, normal S1 S2, no S3 or S4, no murmur, click or rub, no peripheral edema and peripheral pulses strong  ABDOMEN: soft,obese, mid abdomen muscle dehiscence,  nontender, no hepatosplenomegaly, no masses and bowel sounds normal  MS: no gross musculoskeletal defects noted, no edema, palpatory tenderness left knee, normal ROM, palpatory tender left lower SI area     No results found for this or any previous visit (from the past 24 hour(s)).        Assessment & Plan   Problem List Items Addressed This Visit     Moderate persistent asthma    Relevant Medications    fluticasone-salmeterol (ADVAIR) 250-50 MCG/DOSE inhaler    Other specified  hypothyroidism    Relevant Medications    levothyroxine (SYNTHROID/LEVOTHROID) 137 MCG tablet    Morbid obesity (H)    Relevant Orders    COMPREHENSIVE WEIGHT MANAGEMENT      Other Visit Diagnoses     Left knee pain, unspecified chronicity    -  Primary    Relevant Orders    XR Knee Left 3 Views    Left-sided low back pain with left-sided sciatica, unspecified chronicity               Assess X rays knee - consider ortho/ PT   Continue PPI  Start Advaid, stop Flovent   Refer to weight loss clinic   Cont Synthroid, recheck in 3 months     See Patient Instructions  Return in about 3 months (around 3/1/2021) for Lab Work, follow up on acute problem if persists, Routine Visit.    Niko Alfaro MD  [facility]

## 2020-12-02 ASSESSMENT — ASTHMA QUESTIONNAIRES: ACT_TOTALSCORE: 9

## 2020-12-09 ENCOUNTER — APPOINTMENT (OUTPATIENT)
Dept: INTERPRETER SERVICES | Facility: CLINIC | Age: 40
End: 2020-12-09
Payer: COMMERCIAL

## 2020-12-15 ENCOUNTER — TELEPHONE (OUTPATIENT)
Dept: INTERNAL MEDICINE | Facility: CLINIC | Age: 40
End: 2020-12-15

## 2020-12-15 ENCOUNTER — OFFICE VISIT (OUTPATIENT)
Dept: INTERNAL MEDICINE | Facility: CLINIC | Age: 40
End: 2020-12-15
Payer: COMMERCIAL

## 2020-12-15 VITALS
BODY MASS INDEX: 43.4 KG/M2 | HEART RATE: 97 BPM | TEMPERATURE: 98.3 F | SYSTOLIC BLOOD PRESSURE: 132 MMHG | DIASTOLIC BLOOD PRESSURE: 82 MMHG | RESPIRATION RATE: 18 BRPM | OXYGEN SATURATION: 100 % | HEIGHT: 69 IN | WEIGHT: 293 LBS

## 2020-12-15 DIAGNOSIS — J45.40 MODERATE PERSISTENT ASTHMA WITHOUT COMPLICATION: Primary | ICD-10-CM

## 2020-12-15 DIAGNOSIS — M54.50 BILATERAL LOW BACK PAIN, UNSPECIFIED CHRONICITY, UNSPECIFIED WHETHER SCIATICA PRESENT: ICD-10-CM

## 2020-12-15 DIAGNOSIS — E03.8 OTHER SPECIFIED HYPOTHYROIDISM: ICD-10-CM

## 2020-12-15 DIAGNOSIS — M62.08 RECTUS DIASTASIS: ICD-10-CM

## 2020-12-15 DIAGNOSIS — E66.01 MORBID OBESITY (H): ICD-10-CM

## 2020-12-15 PROCEDURE — 84443 ASSAY THYROID STIM HORMONE: CPT | Performed by: INTERNAL MEDICINE

## 2020-12-15 PROCEDURE — 99214 OFFICE O/P EST MOD 30 MIN: CPT | Performed by: INTERNAL MEDICINE

## 2020-12-15 PROCEDURE — 84439 ASSAY OF FREE THYROXINE: CPT | Performed by: INTERNAL MEDICINE

## 2020-12-15 PROCEDURE — 36415 COLL VENOUS BLD VENIPUNCTURE: CPT | Performed by: INTERNAL MEDICINE

## 2020-12-15 RX ORDER — BUDESONIDE AND FORMOTEROL FUMARATE DIHYDRATE 80; 4.5 UG/1; UG/1
2 AEROSOL RESPIRATORY (INHALATION) 2 TIMES DAILY
Qty: 1 INHALER | Refills: 3 | Status: SHIPPED | OUTPATIENT
Start: 2020-12-15 | End: 2021-06-14

## 2020-12-15 RX ORDER — FLUTICASONE PROPIONATE 220 UG/1
AEROSOL, METERED RESPIRATORY (INHALATION)
COMMUNITY
Start: 2020-12-11 | End: 2020-12-24

## 2020-12-15 ASSESSMENT — MIFFLIN-ST. JEOR: SCORE: 2140.53

## 2020-12-15 NOTE — PROGRESS NOTES
Subjective     Gabi Roblero is a 40 year old female who presents to clinic today for the following health issues:    HPI         Asthma Follow-Up- pt has Flovent and Advair inhalers and cannot tolerate both of them , has also tried Qvar in the past,  only using albuterol as needed.  Pt also requesting to see specialist.   Was ACT completed today?    Yes    ACT Total Scores 12/15/2020   ACT TOTAL SCORE -   ASTHMA ER VISITS -   ASTHMA HOSPITALIZATIONS -   ACT TOTAL SCORE (Goal Greater than or Equal to 20) 16   In the past 12 months, how many times did you visit the emergency room for your asthma without being admitted to the hospital? 0   In the past 12 months, how many times were you hospitalized overnight because of your asthma? 0          How many days per week do you miss taking your asthma controller medication?  7 pt has Advair and also Flovent, pt says she cannot tolerate both the inhalers.     Please describe any recent triggers for your asthma: Patient is unaware of triggers    Have you had any Emergency Room Visits, Urgent Care Visits, or Hospital Admissions since your last office visit?  No        Hypothyroidism Follow-up      Since last visit, patient describes the following symptoms: has some wt gain , no hair loss, no skin changes, no constipation, no loose stools  On levoxyl 137 mcg daily        Pt also c/o chronic low back pain on and off since 4 yrs, pain occ radaites to lt leg , no numbness/tingling of bilateral LE ,  No bladder/bowel incontinence ,no weakness, taking on and off Tyelnol w/o symptom relief.  No h/o injury     Pt says she has been diagnosed with abd hernia and has seen surgery in the past.         Past Medical History:   Diagnosis Date     GERD (gastroesophageal reflux disease)      Hypothyroidism      Moderate persistent asthma 4/28/2016       Current Outpatient Medications   Medication Sig Dispense Refill     acetaminophen (TYLENOL) 325 MG tablet Take 2 tablets (650 mg) by  "mouth every 4 hours as needed for mild pain or fever (greater than or equal to 38  C /100.4  F (oral) or 38.5  C/ 101.4  F (core).) 100 tablet 1     budesonide-formoterol (SYMBICORT) 80-4.5 MCG/ACT Inhaler Inhale 2 puffs into the lungs 2 times daily 1 Inhaler 3     omeprazole (PRILOSEC) 40 MG DR capsule Take 1 capsule (40 mg) by mouth daily 90 capsule 3     order for DME Equipment being ordered: Nebulizer 1 Device 0     albuterol (PROAIR HFA) 108 (90 Base) MCG/ACT inhaler Inhale 2 puffs into the lungs every 6 hours as needed for shortness of breath / dyspnea (Patient not taking: Reported on 12/15/2020) 1 Inhaler 0     albuterol (PROVENTIL) (2.5 MG/3ML) 0.083% neb solution Take 1 vial (2.5 mg) by nebulization every 6 hours as needed for shortness of breath / dyspnea or wheezing (Patient not taking: Reported on 12/15/2020) 10 vial 0     levothyroxine (SYNTHROID/LEVOTHROID) 150 MCG tablet Take 1 tablet (150 mcg) by mouth daily 60 tablet 0         Review of Systems   CONSTITUTIONAL: NEGATIVE for fever, chills, change in weight  EYES: NEGATIVE for vision changes or irritation  ENT/MOUTH: NEGATIVE for ear, mouth and throat problems  RESP: NEGATIVE for significant cough or SOB  CV: NEGATIVE for chest pain, palpitations or peripheral edema  MUSCULOSKELETAL: low back pain   NEURO: NEGATIVE for weakness, dizziness or paresthesias  PSYCHIATRIC: NEGATIVE for changes in mood or affect      Objective    /82   Pulse 97   Temp 98.3  F (36.8  C) (Oral)   Resp 18   Ht 1.753 m (5' 9\")   Wt 140.6 kg (310 lb)   LMP 11/28/2020   SpO2 100%   BMI 45.78 kg/m    Body mass index is 45.78 kg/m .  Physical Exam   GENERAL: healthy, alert and no distress, pt can talk in complete sentences ,  EYES: Eyes grossly normal to inspection, PERRL and conjunctivae and sclerae normal  NECK: no adenopathy, no asymmetry, masses, or scars and thyroid normal to palpation  RESP: lungs clear to auscultation - no rales, rhonchi or wheezes  CV: " regular rate and rhythm,  Abd; rectus diastasis noted.   MS: no vertebral tenderness, has tenderness on bilateral paralumbar muscles, SLR negative bilaterally .   NEURO: reflexes normal , Normal strength and tone, mentation intact and speech normal             Assessment & Plan     (J45.40) Moderate persistent asthma without complication  (primary encounter diagnosis)  Plan: pt says she has not tolerated Flovent, Advair and Qvar,  Only using  albuterol prn,  Will start budesonide-formoterol (SYMBICORT) 80-4.5 MCG/ACT Inhaler as directed.explained clearly about the medication,insructions and side effects. Also referred to ALLERGY/ASTHMA ADULT REFERRAL,              (E03.8) Other specified hypothyroidism  Plan: on levoxyl 137 mcg daily ,   TSH with free T4 reflex- 8.03 and T4 1.20 ,  Will increase levoxyl to 150 mcg daily and rpt TSH in 6 wks.             (M54.5) Bilateral low back pain, unspecified chronicity, unspecified whether sciatica present  Plan: advised to use OTC Ibuprofen prn as directed, heat prn, OTC lidoderm patches as directed. Also advised wt loss,  referred to Orthopedic & Spine  Referral            (E66.01) Morbid obesity (H)  Plan:  Discussed in detail about Diet,calorie intake,and importance of regular exercise     (M62.08) Rectus diastasis  Plan: f/u with general surgery , pt says she is scheduling appt          Return in about 5 weeks (around 1/19/2021) for Lab Work - TSH.    Dhruv Andino MD  [facility]

## 2020-12-15 NOTE — TELEPHONE ENCOUNTER
Patient calling  She went to pharmacy [facility] but they did not have a new inhaler for her. Please advise. Ok to call and lm [phone]

## 2020-12-15 NOTE — NURSING NOTE
"Pulse 97   Temp 98.3  F (36.8  C) (Oral)   Resp 18   Ht 1.753 m (5' 9\")   Wt 140.6 kg (310 lb)   LMP 11/28/2020   SpO2 100%   BMI 45.78 kg/m    Patient in for consultation Chronic low back pain and Lt leg/ buttock pain radiates to Lt foot.  Deirdre Del Valle, BRO    "

## 2020-12-15 NOTE — TELEPHONE ENCOUNTER
Patient had an office visit today, were you planning on changing or adding an inhaler?  CHEYANNE Tee R.N.

## 2020-12-16 ENCOUNTER — TELEPHONE (OUTPATIENT)
Dept: INTERNAL MEDICINE | Facility: CLINIC | Age: 40
End: 2020-12-16

## 2020-12-16 DIAGNOSIS — J45.40 MODERATE PERSISTENT ASTHMA WITHOUT COMPLICATION: ICD-10-CM

## 2020-12-16 LAB
T4 FREE SERPL-MCNC: 1.2 NG/DL (ref 0.76–1.46)
TSH SERPL DL<=0.005 MIU/L-ACNC: 8.03 MU/L (ref 0.4–4)

## 2020-12-16 RX ORDER — BUDESONIDE AND FORMOTEROL FUMARATE DIHYDRATE 80; 4.5 UG/1; UG/1
2 AEROSOL RESPIRATORY (INHALATION) 2 TIMES DAILY
Qty: 1 INHALER | Refills: 3 | Status: CANCELLED | OUTPATIENT
Start: 2020-12-16

## 2020-12-16 ASSESSMENT — ASTHMA QUESTIONNAIRES: ACT_TOTALSCORE: 16

## 2020-12-16 NOTE — TELEPHONE ENCOUNTER
Central Prior Authorization Team   Phone: [phone]      PA NOT NEEDED    Medication: Budesonide formoterol 80-4.5-PA NOT NEEDED  Insurance Company: [employer] PMAP - Phone [phone] Fax [phone]  Pharmacy Filling the Rx: [place], [place] - [address] [facility]  Filling Pharmacy Phone: [phone]  Filling Pharmacy Fax:    Start Date: 12/16/2020    Insurance prefers Brand.  Called pharamcy and advise to switch.  Pharmacy got a paid claim. Pharmacy will notify patient when medication is ready.

## 2020-12-16 NOTE — TELEPHONE ENCOUNTER
Prior authorization required -non preferred medication.   Please contact insurance at [phone]  [facility] [place]  ID: [id number]  [id number]  NDC# [id number]    Thank you,  Sandrita Miranda [facility]  [phone]

## 2020-12-17 DIAGNOSIS — E03.8 OTHER SPECIFIED HYPOTHYROIDISM: Primary | ICD-10-CM

## 2020-12-17 RX ORDER — LEVOTHYROXINE SODIUM 150 UG/1
150 TABLET ORAL DAILY
Qty: 60 TABLET | Refills: 0 | Status: SHIPPED | OUTPATIENT
Start: 2020-12-17 | End: 2021-03-07

## 2020-12-30 ENCOUNTER — APPOINTMENT (OUTPATIENT)
Dept: INTERPRETER SERVICES | Facility: CLINIC | Age: 40
End: 2020-12-30
Payer: COMMERCIAL

## 2021-01-02 ENCOUNTER — HOSPITAL ENCOUNTER (EMERGENCY)
Facility: CLINIC | Age: 41
Discharge: HOME | End: 2021-01-02
Attending: EMERGENCY MEDICINE | Admitting: EMERGENCY MEDICINE
Payer: COMMERCIAL

## 2021-01-02 VITALS
HEART RATE: 71 BPM | RESPIRATION RATE: 20 BRPM | OXYGEN SATURATION: 98 % | TEMPERATURE: 98.5 F | DIASTOLIC BLOOD PRESSURE: 72 MMHG | SYSTOLIC BLOOD PRESSURE: 117 MMHG

## 2021-01-02 DIAGNOSIS — T78.2XXA ANAPHYLAXIS, INITIAL ENCOUNTER: ICD-10-CM

## 2021-01-02 PROCEDURE — 96372 THER/PROPH/DIAG INJ SC/IM: CPT | Performed by: EMERGENCY MEDICINE

## 2021-01-02 PROCEDURE — 96374 THER/PROPH/DIAG INJ IV PUSH: CPT

## 2021-01-02 PROCEDURE — 250N000011 HC RX IP 250 OP 636: Performed by: EMERGENCY MEDICINE

## 2021-01-02 PROCEDURE — 99284 EMERGENCY DEPT VISIT MOD MDM: CPT

## 2021-01-02 PROCEDURE — 250N000009 HC RX 250: Performed by: EMERGENCY MEDICINE

## 2021-01-02 PROCEDURE — 250N000011 HC RX IP 250 OP 636

## 2021-01-02 PROCEDURE — 96375 TX/PRO/DX INJ NEW DRUG ADDON: CPT

## 2021-01-02 RX ORDER — DIPHENHYDRAMINE HYDROCHLORIDE 50 MG/ML
25 INJECTION INTRAMUSCULAR; INTRAVENOUS ONCE
Status: COMPLETED | OUTPATIENT
Start: 2021-01-02 | End: 2021-01-02

## 2021-01-02 RX ORDER — EPINEPHRINE 1 MG/ML(1)
AMPUL (ML) INJECTION
Status: COMPLETED
Start: 2021-01-02 | End: 2021-01-02

## 2021-01-02 RX ORDER — LIDOCAINE 4 G/G
1 PATCH TOPICAL EVERY 24 HOURS
Qty: 10 PATCH | Refills: 0 | Status: SHIPPED | OUTPATIENT
Start: 2021-01-02

## 2021-01-02 RX ORDER — PREDNISONE 20 MG/1
40 TABLET ORAL DAILY
Qty: 8 TABLET | Refills: 0 | Status: SHIPPED | OUTPATIENT
Start: 2021-01-02 | End: 2021-01-06

## 2021-01-02 RX ORDER — EPINEPHRINE 1 MG/ML
0.3 INJECTION, SOLUTION, CONCENTRATE INTRAVENOUS ONCE
Status: COMPLETED | OUTPATIENT
Start: 2021-01-02 | End: 2021-01-02

## 2021-01-02 RX ORDER — METHYLPREDNISOLONE SODIUM SUCCINATE 125 MG/2ML
125 INJECTION, POWDER, LYOPHILIZED, FOR SOLUTION INTRAMUSCULAR; INTRAVENOUS ONCE
Status: COMPLETED | OUTPATIENT
Start: 2021-01-02 | End: 2021-01-02

## 2021-01-02 RX ORDER — EPINEPHRINE 0.3 MG/.3ML
0.3 INJECTION SUBCUTANEOUS
Qty: 1 EACH | Refills: 1 | Status: SHIPPED | OUTPATIENT
Start: 2021-01-02 | End: 2022-05-18

## 2021-01-02 RX ADMIN — EPINEPHRINE 0.3 MG: 1 INJECTION, SOLUTION, CONCENTRATE INTRAVENOUS at 01:20

## 2021-01-02 RX ADMIN — DIPHENHYDRAMINE HYDROCHLORIDE 25 MG: 50 INJECTION INTRAMUSCULAR; INTRAVENOUS at 00:27

## 2021-01-02 RX ADMIN — EPINEPHRINE 0.3 MG: 1 INJECTION INTRAMUSCULAR; INTRAVENOUS; SUBCUTANEOUS at 00:28

## 2021-01-02 RX ADMIN — FAMOTIDINE 20 MG: 10 INJECTION, SOLUTION INTRAVENOUS at 00:28

## 2021-01-02 RX ADMIN — METHYLPREDNISOLONE SODIUM SUCCINATE 125 MG: 125 INJECTION, POWDER, FOR SOLUTION INTRAMUSCULAR; INTRAVENOUS at 00:28

## 2021-01-02 ASSESSMENT — ENCOUNTER SYMPTOMS
FACIAL SWELLING: 1
ABDOMINAL PAIN: 0
VOMITING: 0
DIARRHEA: 0
NAUSEA: 0

## 2021-01-02 NOTE — ED AVS SNAPSHOT
[facility]  [address]  [facility] [address]  Phone: [phone]  Fax: [phone]                                    Gabi Roblero   MRN: [id number]    Department: [facility]   Date of Visit: 1/2/2021           After Visit Summary Signature Page    I have received my discharge instructions, and my questions have been answered. I have discussed any challenges I see with this plan with the nurse or doctor.    ..........................................................................................................................................  Patient/Patient Representative Signature      ..........................................................................................................................................  Patient Representative Print Name and Relationship to Patient    ..................................................               ................................................  Date                                   Time    ..........................................................................................................................................  Reviewed by Signature/Title    ...................................................              ..............................................  Date                                               Time          22EPIC Rev 08/18

## 2021-01-02 NOTE — ED PROVIDER NOTES
History   Chief Complaint:  Allergic Reaction       The history is provided by the patient. A  was used ([place]).      Gabi Roblero is a 41 year old female with history of asthma who presents with allergic reaction. The patient reports facial swelling after taking naproxen this evening. Specifically, her lips, eyes, nose, and cheeks are swollen. She denies tongue or throat swelling. She states that this has happened in the past with the same medication, though it was not as severe before. She otherwise denies dyspnea, rash, abdominal pain, nausea, vomiting, or diarrhea.    Review of Systems   HENT: Positive for facial swelling.    Respiratory:        Dyspnea (+)   Gastrointestinal: Negative for abdominal pain, diarrhea, nausea and vomiting.   Skin: Negative for rash.   All other systems reviewed and are negative.        Allergies:  The patient has no known allergies.     Medications:  Albuterol Inhaler  Symbicort   Levothyroxine  Omeprazole    Past Medical History:    GERD  Hypothyroidism  Asthma  Rotator cuff syndrome     Past Surgical History:    Cholecystectomy      Family History:    Diabetes    Social History:  The patient presents to the emergency department alone.   Her brother-in-law is in their car outside the ED interpreting over the phone.    Physical Exam     Patient Vitals for the past 24 hrs:   BP Temp Temp src Pulse Resp SpO2   01/02/21 0410 117/72 -- -- 71 -- 98 %   01/02/21 0400 118/74 -- -- 77 -- 96 %   01/02/21 0350 114/71 -- -- 74 -- 99 %   01/02/21 0250 123/68 -- -- -- -- --   01/02/21 0240 -- -- -- 77 -- 99 %   01/02/21 0220 112/67 -- -- 73 -- 97 %   01/02/21 0210 119/66 -- -- 74 -- --   01/02/21 0150 -- -- -- -- -- 99 %   01/02/21 0145 117/76 -- -- 74 -- 100 %   01/02/21 0140 106/68 -- -- 76 -- 100 %   01/02/21 0135 132/84 -- -- 76 -- 100 %   01/02/21 0130 103/85 -- -- -- -- 100 %   01/02/21 0120 114/80 -- -- 74 -- 100 %   01/02/21 0115 114/80 -- -- -- -- 100 %    01/02/21 0100 105/74 -- -- -- -- 100 %   01/02/21 0012 124/82 98.5  F (36.9  C) Oral 82 20 97 %     Physical Exam  I have reviewed the triage vital signs    Eyes: No discharge, symmetrical lids  ENT: Moist mucous membranes, no ear discharge. Angioedema present to bilateral upper and lower lips.  No tongue or posterior pharynx angioedema.  Voice normal.  No stridor.  Controlling secretions.  Neck: Full range of motion  Respiratory: CTAB, no wheezes  Cardiovascular: Regular rate and rhythm, no lower extremity edema  Chest: Equal rise  Gastrointestinal: Soft. Nondistended. NTTP. No rebound or guarding  Musculoskeletal: No gross deformities.   Skin: Warm and well perfused. Scattered erythematous urticarial rash on bilateral forearms.  Neurologic: Moves all extremities, speech fluent without dysarthria  Psychiatric: Appropriate affect, alert and interactive    Emergency Department Course   Emergency Department Course:  Reviewed:  I reviewed the patient's nursing notes, vitals, and available past medical records.     Assessments:  0018 I performed an exam of the patient and obtained history, as documented above.    0105 I reassessed the patient, who feels that her throat is beginning to swell a bit. Another dose of epinephrine will be administered.    0153 I rechecked the patient.     0550 I rechecked the patient, who is comfortable with discharge at this time.    Interventions:  0027 Benadryl 20 mg IV    0028 Epinephrine 0.3 mg IM   Solu-Medrol 125 mg IV   Pepcid 20 mg IV    0120 Epinephrine 0.3 mg IM    Disposition:  The patient was discharged to home.     Impression & Plan   Medical Decision Making:  Gabi Roblero is a 41 year old female who presents for evaluation of facial swelling. Signs and symptoms are consistent with anaphylaxis. Patient was treated here with medications as noted above. Will send home with epipen, steroids, antihistamines. Return of anaphylactic symptoms were discussed with patient and  they were instructed to inject epi-pen and call 911 should these symptoms occur. Pt was observed for 6 hours with improvement in symptoms.  Given the rapidity of resolution, lack of serious systemic symptoms, lack of respiratory difficulty and no oral or pharyngeal swelling, would not admit at this time for anaphylaxis. There are no signs of anaphylactic shock.      Diagnosis:    ICD-10-CM    1. Anaphylaxis, initial encounter  T78.2XXA        Discharge Medications:  Discharge Medication List as of 1/2/2021  5:16 AM      START taking these medications    Details   EPINEPHrine (ANY BX GENERIC EQUIV) 0.3 MG/0.3ML injection 2-pack Inject 0.3 mLs (0.3 mg) into the muscle once as needed for anaphylaxis, Disp-1 each, R-1, Local Print      Lidocaine (LIDOCARE) 4 % Patch Place 1 patch onto the skin every 24 hours To prevent lidocaine toxicity, patient should be patch free for 12 hrs daily.Disp-10 patch, R-0Local Print      predniSONE (DELTASONE) 20 MG tablet Take 2 tablets (40 mg) by mouth daily for 4 days, Disp-8 tablet, R-0, Local Print             Scribe Disclosure:  Shannon BOWLES, am serving as a scribe at 12:18 AM on 1/2/2021 to document services personally performed by Zachary Vincent MD based on my observations and the provider's statements to me.     Zachary Vincent MD  January 2, 2021   [facility] EMERGENCY DEPARTMENT     Zachary Vincent MD  01/02/21 0628

## 2021-01-04 ENCOUNTER — APPOINTMENT (OUTPATIENT)
Dept: INTERPRETER SERVICES | Facility: CLINIC | Age: 41
End: 2021-01-04
Payer: COMMERCIAL

## 2021-01-04 ENCOUNTER — THERAPY VISIT (OUTPATIENT)
Dept: PHYSICAL THERAPY | Facility: CLINIC | Age: 41
End: 2021-01-04
Payer: COMMERCIAL

## 2021-01-04 DIAGNOSIS — M25.552 HIP PAIN, LEFT: ICD-10-CM

## 2021-01-04 DIAGNOSIS — M54.42 LEFT-SIDED LOW BACK PAIN WITH LEFT-SIDED SCIATICA: ICD-10-CM

## 2021-01-04 PROCEDURE — 97110 THERAPEUTIC EXERCISES: CPT | Mod: GP | Performed by: PHYSICAL THERAPIST

## 2021-01-04 PROCEDURE — 97162 PT EVAL MOD COMPLEX 30 MIN: CPT | Mod: GP | Performed by: PHYSICAL THERAPIST

## 2021-01-04 NOTE — PROGRESS NOTES
"[facility] Initial Evaluation -- Lumbar    Date: January 4, 2021  Gabi Roblero is a 41 year old female with a lumbar condition.   Referral: Dr. Alfaro  Work mechanical stresses:  PCA  Employment status:  Part time  Leisure mechanical stresses: n/a  Functional disability score (DALIA/STarT Back):  n/a  VAS score (0-10): 5/10   Patient goals/expectations:  To get rid of the pain    HISTORY:    Present symptoms: L buttocks, L thigh, L knee  Pain quality (sharp/shooting/stabbing/aching/burning/cramping):  aching   Paresthesia (yes/no):  ?describes \"itch\" and doesn't feel like R foot    Present since (onset date): Chronic over last 3 years; most recently worsened November 2020.     Symptoms (improving/unchanging/worsening):  worsening.     Symptoms commenced as a result of: no apparent reason   Condition occurred in the following environment:        Symptoms at onset (back/thigh/leg): L buttocks  Constant symptoms (back/thigh/leg): L buttocks  Intermittent symptoms (back/thigh/leg):     Symptoms are made worse with the following: Always Sitting, Always Rising, Always Standing, Always Walking, Time of day - Always as the day progresses and Always On the move   Symptoms are made better with the following: Always Lying, Sometimes When still, Tylenol not much help    Disturbed sleep (yes/no):  yes Sleeping postures (prone/sup/side R/L): sides    Previous episodes (0/1-5/6-10/11+): ongoing over last 3 years Year of first episode:     Previous history: chronic  Previous treatments: no,       Specific Questions:  Cough/Sneeze/Strain (pos/neg): neg  Bowel/Bladder (normal/abnormal): normal  Gait (normal/abnormal): abnormal  Medications (nil/NSAIDS/analg/steroids/anticoag/other):  Other - Tylenol, asthma meds  Medical allergies:  See chart  General health (excellent/good/fair/poor):  fair  Pertinent medical history:  Asthma, Overweight and Thyroid problems  Imaging (None/Xray/MRI/Other):  none  Recent or " major surgery (yes/no):  no  Night pain (yes/no): no  Accidents (yes/no): no  Unexplained weight loss (yes/no): none  Barriers at home: none  Other red flags: none    EXAMINATION    Posture:   Sitting (good/fair/poor): fair  Standing (good/fair/poor):fair  Lordosis (red/acc/normal): acc  Correction of posture (better/worse/no effect): better back,     Lateral Shift (right/left/nil): nil  Relevant (yes/no):  no  Other Observations: none    Neurological:    Motor deficit:  n/a  Reflexes:  n/a  Sensory deficit:  n/a  Dural signs:  n/a    Movement Loss:   [place]ley Mod Min Nil Pain   Flexion    x pdm L buttocks   Extension    x    Side Gliding R   x x    Side Gliding L   x x pdm L hip     Test Movements:   During: produces, abolishes, increases, decreases, no effect, centralizing, peripheralizing   After: better, worse, no better, no worse, no effect, centralized, peripheralized    Pretest symptoms standing:    Symptoms During Symptoms After ROM increased ROM decreased No Effect   FIS        Rep FIS        EIS        Rep EIS          Pretest symptoms lying: painfree    Symptoms During Symptoms After ROM increased ROM decreased No Effect   DELMAR Produces L hip    No Worse         Rep DELMAR Produces L hip    No Worse      x   EIL No Effect    No Effect         Rep EIL No Effect    No Effect      x     If required, pretest symptoms:    Symptoms During Symptoms After ROM increased ROM decreased No Effect   SGIS - R        Rep SGIS - R        SGIS - L        Rep SGIS - L          Static Tests:  Sitting slouched:     Sitting erect:    Standing slouched:   Standing erect:    Lying prone in extension:   Long sitting:      Other Tests: PROM: L knee Flex=min loss erp, Ext=min loss erp; L hip: Flex=min loss erp, IR=min loss, erp, ER=min to mod loss erp, ABD=min loss erp;     L hip MMT: Abd=3/5, Ext=3-/5, IR=3+/5, ER=4-/5    Provisional Classification:  Inconclusive/Other - L hip/knee    Principle of Management:  Education:  Traffic light,  therapeutic dose of exercise     Equipment provided:    Mechanical therapy (Y/N):     Extension principle:    Lateral Principle:    Flexion principle:    Other:  L hip strenghthening    ASSESSMENT/PLAN:    Patient is a 41 year old female with lumbar and left side hip complaints.    Patient has the following significant findings with corresponding treatment plan.                Diagnosis 1:  L LBP/hip pain  Pain -  manual therapy, self management, education, directional preference exercise and home program  Decreased ROM/flexibility - manual therapy and therapeutic exercise  Decreased joint mobility - manual therapy and therapeutic exercise  Decreased strength - therapeutic exercise and therapeutic activities  Decreased function - therapeutic activities    Therapy Evaluation Codes:   1) History comprised of:   Personal factors that impact the plan of care:      Language.    Comorbidity factors that impact the plan of care are:      Asthma, Overweight and thyroid.     Medications impacting care: Steroids and asthma meds.  2) Examination of Body Systems comprised of:   Body structures and functions that impact the plan of care:      Hip, Knee and Lumbar spine.   Activity limitations that impact the plan of care are:      Squatting/kneeling, Stairs, Standing and Walking.  3) Clinical presentation characteristics are:   Evolving/Changing.  4) Decision-Making    Moderate complexity using standardized patient assessment instrument and/or measureable assessment of functional outcome.  Cumulative Therapy Evaluation is: Moderate complexity.    Previous and current functional limitations:  (See Goal Flow Sheet for this information)    Short term and Long term goals: (See Goal Flow Sheet for this information)     Communication ability:  Patient appears to be able to clearly communicate and understand verbal and written communication and follow directions correctly.  Patient has an  for communication  clarity.  Treatment Explanation - The following has been discussed with the patient:   RX ordered/plan of care  Anticipated outcomes  Possible risks and side effects  This patient would benefit from PT intervention to resume normal activities.   Rehab potential is good.    Frequency:  1 X week, once daily  Duration:  for 6 weeks  Discharge Plan:  Achieve all LTG.  Independent in home treatment program.  Reach maximal therapeutic benefit.    Please refer to the daily flowsheet for treatment today, total treatment time and time spent performing 1:1 timed codes.

## 2021-01-05 PROBLEM — M54.42 LEFT-SIDED LOW BACK PAIN WITH LEFT-SIDED SCIATICA: Status: ACTIVE | Noted: 2021-01-05

## 2021-01-05 PROBLEM — M25.552 HIP PAIN, LEFT: Status: ACTIVE | Noted: 2021-01-05

## 2021-02-17 ENCOUNTER — OFFICE VISIT (OUTPATIENT)
Dept: INTERNAL MEDICINE | Facility: CLINIC | Age: 41
End: 2021-02-17
Payer: COMMERCIAL

## 2021-02-17 ENCOUNTER — TELEPHONE (OUTPATIENT)
Dept: INTERNAL MEDICINE | Facility: CLINIC | Age: 41
End: 2021-02-17

## 2021-02-17 VITALS
SYSTOLIC BLOOD PRESSURE: 134 MMHG | WEIGHT: 293 LBS | DIASTOLIC BLOOD PRESSURE: 82 MMHG | OXYGEN SATURATION: 98 % | BODY MASS INDEX: 43.4 KG/M2 | HEART RATE: 102 BPM | TEMPERATURE: 98.1 F | HEIGHT: 69 IN | RESPIRATION RATE: 14 BRPM

## 2021-02-17 DIAGNOSIS — K64.4 EXTERNAL HEMORRHOIDS: ICD-10-CM

## 2021-02-17 DIAGNOSIS — K64.4 EXTERNAL HEMORRHOIDS: Primary | ICD-10-CM

## 2021-02-17 DIAGNOSIS — K59.00 CONSTIPATION, UNSPECIFIED CONSTIPATION TYPE: ICD-10-CM

## 2021-02-17 DIAGNOSIS — E66.01 MORBID OBESITY (H): ICD-10-CM

## 2021-02-17 DIAGNOSIS — E03.8 OTHER SPECIFIED HYPOTHYROIDISM: ICD-10-CM

## 2021-02-17 PROBLEM — M25.552 HIP PAIN, LEFT: Status: RESOLVED | Noted: 2021-01-05 | Resolved: 2021-02-17

## 2021-02-17 PROBLEM — M54.42 LEFT-SIDED LOW BACK PAIN WITH LEFT-SIDED SCIATICA: Status: RESOLVED | Noted: 2021-01-05 | Resolved: 2021-02-17

## 2021-02-17 PROCEDURE — 84439 ASSAY OF FREE THYROXINE: CPT | Performed by: INTERNAL MEDICINE

## 2021-02-17 PROCEDURE — 84443 ASSAY THYROID STIM HORMONE: CPT | Performed by: INTERNAL MEDICINE

## 2021-02-17 PROCEDURE — 36415 COLL VENOUS BLD VENIPUNCTURE: CPT | Performed by: INTERNAL MEDICINE

## 2021-02-17 PROCEDURE — 99214 OFFICE O/P EST MOD 30 MIN: CPT | Performed by: INTERNAL MEDICINE

## 2021-02-17 RX ORDER — HYDROCORTISONE 25 MG/G
CREAM TOPICAL 2 TIMES DAILY PRN
Qty: 30 G | Refills: 0 | Status: SHIPPED | OUTPATIENT
Start: 2021-02-17 | End: 2022-02-14

## 2021-02-17 RX ORDER — HYDROCORTISONE ACETATE 25 MG/1
25 SUPPOSITORY RECTAL 2 TIMES DAILY PRN
Qty: 14 SUPPOSITORY | Refills: 0 | Status: SHIPPED | OUTPATIENT
Start: 2021-02-17 | End: 2022-02-14

## 2021-02-17 RX ORDER — HYDROCORTISONE ACETATE 25 MG/1
25 SUPPOSITORY RECTAL 2 TIMES DAILY PRN
Qty: 14 SUPPOSITORY | Refills: 0 | Status: CANCELLED | OUTPATIENT
Start: 2021-02-17

## 2021-02-17 RX ORDER — HYDROCORTISONE ACETATE 25 MG/1
25 SUPPOSITORY RECTAL 2 TIMES DAILY PRN
Qty: 14 SUPPOSITORY | Refills: 0 | Status: SHIPPED | OUTPATIENT
Start: 2021-02-17 | End: 2021-02-17

## 2021-02-17 ASSESSMENT — MIFFLIN-ST. JEOR: SCORE: 2126.91

## 2021-02-17 NOTE — PROGRESS NOTES
Assessment & Plan     (K64.4) External hemorrhoids  (primary encounter diagnosis)  Plan: Explained about the condition, patient was advised to keep her stools soft , advised to increase water intake, increase fiber intake, try over-the-counter Metamucil daily and MiraLAX every other day.   Patient was advised sitz bath( explained)  Patient was started on hydrocortisone, Perianal, (HYDROCORTISONE) 2.5 % cream, and hydrocortisone (ANUSOL-HC) 25 MG  Suppository as directed.explained clearly about the medication,insructions and side effects. .Call or return to clinic prn if these symtoms worsen, fail to improve as anticipated, or if new symptoms develop.         (E03.8) Other specified hypothyroidism  Plan: Levoxyl dose was increased to 150 mcg at last office visit, repeat TSH with free T4 reflex and adjust Levoxyl dose after TSH results            (E66.01) Morbid obesity (H)  Plan:  Discussed in detail about Diet,calorie intake,and importance of regular exercise       (K59.00) Constipation, unspecified constipation type  Plan:  advised to increase water intake, increase fiber intake, try over-the-counter Metamucil daily and MiraLAX every other day.Call or return to clinic prn if these symtoms worsen, fail to improve as anticipated, or if new symptoms develop.       Review of the result(s) of each unique test - TSH- TSH 4.52 today and T4 normal , continue sema levoxyl dose and rpt TSH in 8 wks.       Return in about 3 months (around 5/17/2021).    Dhruv Andino MD  [facility]    Ani Ashley is a 41 year old who presents for the following health issues  accompanied by her sister and Phone :    HPI     Pt is a 41 year old female who is seen here to day with c/o chronic constipaiton since many yrs, no abdominal pain, no blood in the stool . also c/o hemorrhoids since 3 days, has pain with BM, no blood in stool, patient used over-the-counter hemorrhoid cream with  some relief.      Hypothyroidism Follow-up      Since last visit, patient describes the following symptoms: Levoxyl was increased to 150 mcg daily at last office visit, weight stable, no hair loss, no skin changes, no constipation, no loose stools     Past Medical History:   Diagnosis Date     GERD (gastroesophageal reflux disease)      Hypothyroidism      Moderate persistent asthma 4/28/2016       Current Outpatient Medications   Medication Sig Dispense Refill     acetaminophen (TYLENOL) 325 MG tablet Take 2 tablets (650 mg) by mouth every 4 hours as needed for mild pain or fever (greater than or equal to 38  C /100.4  F (oral) or 38.5  C/ 101.4  F (core).) 100 tablet 1     albuterol (PROAIR HFA) 108 (90 Base) MCG/ACT inhaler Inhale 2 puffs into the lungs every 6 hours as needed for shortness of breath / dyspnea 1 Inhaler 0     albuterol (PROVENTIL) (2.5 MG/3ML) 0.083% neb solution Take 1 vial (2.5 mg) by nebulization every 6 hours as needed for shortness of breath / dyspnea or wheezing 10 vial 0     budesonide-formoterol (SYMBICORT) 80-4.5 MCG/ACT Inhaler Inhale 2 puffs into the lungs 2 times daily 1 Inhaler 3     EPINEPHrine (ANY BX GENERIC EQUIV) 0.3 MG/0.3ML injection 2-pack Inject 0.3 mLs (0.3 mg) into the muscle once as needed for anaphylaxis 1 each 1     hydrocortisone (ANUSOL-HC) 25 MG suppository Place 1 suppository (25 mg) rectally 2 times daily as needed for hemorrhoids 14 suppository 0     hydrocortisone, Perianal, (HYDROCORTISONE) 2.5 % cream Place rectally 2 times daily as needed for hemorrhoids 30 g 0     levothyroxine (SYNTHROID/LEVOTHROID) 150 MCG tablet Take 1 tablet (150 mcg) by mouth daily 60 tablet 0     Lidocaine (LIDOCARE) 4 % Patch Place 1 patch onto the skin every 24 hours To prevent lidocaine toxicity, patient should be patch free for 12 hrs daily. 10 patch 0     omeprazole (PRILOSEC) 40 MG DR capsule Take 1 capsule (40 mg) by mouth daily 90 capsule 3     order for DME Equipment being  "ordered: Nebulizer 1 Device 0       Review of Systems   CONSTITUTIONAL: NEGATIVE for fever, chills, change in weight  RESP: NEGATIVE for significant cough or SOB  CV: NEGATIVE for chest pain, palpitations or peripheral edema  GI: Constipation/hemorrhoids  ENDOCRINE: Hx thyroid disease      Objective    /82   Pulse 102   Temp 98.1  F (36.7  C) (Oral)   Resp 14   Ht 1.753 m (5' 9\")   Wt 139.8 kg (308 lb 1.6 oz)   LMP 02/10/2021   SpO2 98%   BMI 45.50 kg/m    Body mass index is 45.5 kg/m .  Physical Exam   GENERAL: healthy, alert and no distress  RESP: lungs clear to auscultation - no rales, rhonchi or wheezes  CV: regular rate and rhythm,    ABDOMEN: soft, nontender,   and bowel sounds normal  ; one external hemorrhoid noted, no bleeding                 "

## 2021-02-17 NOTE — NURSING NOTE
"/82   Pulse 102   Temp 98.1  F (36.7  C) (Oral)   Resp 14   Ht 1.753 m (5' 9\")   Wt 139.8 kg (308 lb 1.6 oz)   LMP 02/10/2021   SpO2 98%   BMI 45.50 kg/m    Patient in for consult  Chronic constipation. Hemerrhoids x's 3 days.  Deirdre Del Valle CMA    "

## 2021-02-17 NOTE — TELEPHONE ENCOUNTER
Not covered. Can you try for a prior authorization as she has a state insurance [employer] [place]  And pa needs to be denied for her to be able to pay cash for it.    ID# [id number]  [id number]    Thank you,  Sandrita Miranda [facility]  [phone]

## 2021-02-18 LAB
T4 FREE SERPL-MCNC: 1.3 NG/DL (ref 0.76–1.46)
TSH SERPL DL<=0.005 MIU/L-ACNC: 4.52 MU/L (ref 0.4–4)

## 2021-02-22 NOTE — TELEPHONE ENCOUNTER
Central Prior Authorization Team   Phone: [phone]    PA Initiation    Medication: Hydrocortisone 25mg supp  Insurance Company: [employer] [place] - Phone [phone] Fax [phone]  Pharmacy Filling the Rx: [place], [place] - [address] [facility]  Filling Pharmacy Phone: [phone]  Filling Pharmacy Fax: [phone]  Start Date: 2/22/2021

## 2021-02-22 NOTE — TELEPHONE ENCOUNTER
PRIOR AUTHORIZATION DENIED    Medication: Hydrocortisone 25mg supp-DENIED    Denial Date: 2/22/2021    Denial Rational: MEDICATION IS EXCLUDED FROM COVERAGE.  ALTERNATIVES NOT GIVEN ON EXCLUDED MEDICATIONS.             Appeal Information:  IF YOU WOULD LIKE TO APPEAL PLEASE SUPPLY PA TEAM WITH A LETTER OF MEDICAL NECESSITY WITH CLINICAL REASON.

## 2021-02-22 NOTE — TELEPHONE ENCOUNTER
Please advise patient that her insurance does not cover annular Anusol suppositories.  We can refer patient to see colorectal specialist if she still has symptoms

## 2021-02-24 DIAGNOSIS — E03.8 OTHER SPECIFIED HYPOTHYROIDISM: ICD-10-CM

## 2021-02-24 PROCEDURE — 36415 COLL VENOUS BLD VENIPUNCTURE: CPT | Performed by: INTERNAL MEDICINE

## 2021-02-24 PROCEDURE — 84443 ASSAY THYROID STIM HORMONE: CPT | Performed by: INTERNAL MEDICINE

## 2021-02-24 PROCEDURE — 84439 ASSAY OF FREE THYROXINE: CPT | Performed by: INTERNAL MEDICINE

## 2021-02-24 NOTE — LETTER
[place] [address], [address]  [place], [place] [address] [phone]        March 17, 2021    Gabi Roblero  [address] [address] [id number]            Dear Ms. Gabi Roblero:    TSH better than before but still high. Please continue Levoxyl 150 mcg daily and take an extra 1/2 tablet once a week and repeat TSH in 6 weeks  If you have any further questions or problems, please contact our office.    Sincerely,        Omar Andino M.D.

## 2021-02-25 LAB
T4 FREE SERPL-MCNC: 1.36 NG/DL (ref 0.76–1.46)
TSH SERPL DL<=0.005 MIU/L-ACNC: 4.35 MU/L (ref 0.4–4)

## 2021-02-25 NOTE — TELEPHONE ENCOUNTER
Patient advised med not covered via   service.  Offered referral to colorectal specialist if having symptoms, explained what types of problems the specialist sees patients for and patient hung up.  Tried calling patient back but she did not answer.  CHEYANNE Tee R.N.

## 2021-03-07 RX ORDER — LEVOTHYROXINE SODIUM 150 UG/1
TABLET ORAL
Qty: 64 TABLET | Refills: 1 | Status: SHIPPED | OUTPATIENT
Start: 2021-03-07 | End: 2021-11-04

## 2021-03-11 DIAGNOSIS — Z53.9 ERRONEOUS ENCOUNTER--DISREGARD: Primary | ICD-10-CM

## 2021-03-18 DIAGNOSIS — J45.40 MODERATE PERSISTENT ASTHMA WITHOUT COMPLICATION: ICD-10-CM

## 2021-03-18 RX ORDER — ALBUTEROL SULFATE 90 UG/1
2 AEROSOL, METERED RESPIRATORY (INHALATION) EVERY 6 HOURS PRN
Qty: 18 G | Refills: 0 | Status: SHIPPED | OUTPATIENT
Start: 2021-03-18 | End: 2021-04-29

## 2021-03-18 NOTE — TELEPHONE ENCOUNTER
Pending Prescriptions:                       Disp   Refills    albuterol (PROAIR HFA) 108 (90 Base) MCG/A*                Sig: Inhale 2 puffs into the lungs every 6 hours as needed           for shortness of breath / dyspnea    Routing refill request to provider for review/approval because:  ACT Total Scores 2/26/2020 12/1/2020 12/15/2020   ACT TOTAL SCORE - - -   ASTHMA ER VISITS - - -   ASTHMA HOSPITALIZATIONS - - -   ACT TOTAL SCORE (Goal Greater than or Equal to 20) 13 9 16   In the past 12 months, how many times did you visit the emergency room for your asthma without being admitted to the hospital? - 0 0   In the past 12 months, how many times were you hospitalized overnight because of your asthma? - 0 0

## 2021-04-28 DIAGNOSIS — J45.40 MODERATE PERSISTENT ASTHMA WITHOUT COMPLICATION: ICD-10-CM

## 2021-04-29 RX ORDER — ALBUTEROL SULFATE 90 UG/1
AEROSOL, METERED RESPIRATORY (INHALATION)
Qty: 18 G | Refills: 0 | Status: SHIPPED | OUTPATIENT
Start: 2021-04-29 | End: 2021-06-14

## 2021-04-29 NOTE — TELEPHONE ENCOUNTER
Proair inhaler refill request  Due for appt next month, will fill x 1.     2/17/21    ACT Total Scores 2/26/2020 12/1/2020 12/15/2020   ACT TOTAL SCORE - - -   ASTHMA ER VISITS - - -   ASTHMA HOSPITALIZATIONS - - -   ACT TOTAL SCORE (Goal Greater than or Equal to 20) 13 9 16   In the past 12 months, how many times did you visit the emergency room for your asthma without being admitted to the hospital? - 0 0   In the past 12 months, how many times were you hospitalized overnight because of your asthma? - 0 0

## 2021-06-22 NOTE — TELEPHONE ENCOUNTER
Please advise pt needs higher dose synthroid, new eRx sent.  Repeat labs in 3 months    Edna Perry CNP     Patient's belongings returned

## 2021-08-20 ENCOUNTER — NURSE TRIAGE (OUTPATIENT)
Dept: NURSING | Facility: CLINIC | Age: 41
End: 2021-08-20

## 2021-08-20 DIAGNOSIS — J45.40 MODERATE PERSISTENT ASTHMA WITHOUT COMPLICATION: ICD-10-CM

## 2021-08-20 RX ORDER — ALBUTEROL SULFATE 90 UG/1
AEROSOL, METERED RESPIRATORY (INHALATION)
Qty: 6.7 G | Refills: 0
Start: 2021-08-20 | End: 2021-09-08

## 2021-08-20 RX ORDER — DILTIAZEM HYDROCHLORIDE 60 MG/1
TABLET, FILM COATED ORAL
Qty: 6.9 G | Refills: 0
Start: 2021-08-20 | End: 2021-09-08

## 2021-08-20 NOTE — TELEPHONE ENCOUNTER
Informed patient's sister that patient cannot get anymore refills if she does not have an appointment. Patient is now scheduled for 9/8/21. Medications called into [facility] b/c they were closing.

## 2021-08-20 NOTE — TELEPHONE ENCOUNTER
"Call from patient's sister, Maynor, who says patient needs a refill of her inhalers; will come in for visit if needed; had trouble sleeping last night.     Gave verbal order to [facility] to for albuterol and Symbicort inhalers (one inhaler each, no refills) since pharmacy was closing in 10 min and e-script would take too long.     Patient is scheduled for follow-up on 9/8/21.      Vicente Adair RN/[facility]    Reason for Disposition    Caller requesting a refill, no triage required, and triager able to refill per unit policy    Additional Information    Negative: MORE THAN A DOUBLE DOSE of a prescription or over-the-counter (OTC) drug    Negative: [1] DOUBLE DOSE (an extra dose or lesser amount) of over-the-counter (OTC) drug AND [2] any symptoms (e.g., dizziness, nausea, pain, sleepiness)    Negative: [1] DOUBLE DOSE (an extra dose or lesser amount) of prescription drug AND [2] any symptoms (e.g., dizziness, nausea, pain, sleepiness)    Negative: Took another person's prescription drug    Negative: [1] DOUBLE DOSE (an extra dose or lesser amount) of prescription drug AND [2] NO symptoms (Exception: a double dose of antibiotics)    Negative: Diabetes drug error or overdose (e.g., took wrong type of insulin or took extra dose)    Negative: [1] Request for URGENT new prescription or refill of \"essential\" medication (i.e., likelihood of harm to patient if not taken) AND [2] triager unable to fill per unit policy    Negative: [1] Prescription not at pharmacy AND [2] was prescribed by PCP recently    Negative: [1] Pharmacy calling with prescription questions AND [2] triager unable to answer question    Negative: [1] Caller has URGENT medication question about med that PCP or specialist prescribed AND [2] triager unable to answer question    Negative: [1] Caller has NON-URGENT medication question about med that PCP prescribed AND [2] triager unable to answer question    Negative: [1] Caller requesting " a NON-URGENT new prescription or refill AND [2] triager unable to refill per unit policy    Negative: [1] Caller has medication question about med not prescribed by PCP AND [2] triager unable to answer question (e.g., compatibility with other med, storage)    Negative: Caller requesting a CONTROLLED substance prescription refill (e.g., narcotics, ADHD medicines)    Negative: Caller wants to use a complementary or alternative medicine    Negative: [1] Prescription prescribed recently is not at pharmacy AND [2] triager has access to patient's EMR AND [3] prescription is recorded in the EMR    Negative: [1] DOUBLE DOSE (an extra dose or lesser amount) of over-the-counter (OTC) drug AND [2] NO symptoms    Negative: [1] DOUBLE DOSE (an extra dose or lesser amount) of antibiotic drug AND [2] NO symptoms    Negative: Caller requesting information about medication use with breastfeeding; neither adult nor infant is ill, triager answers question    Negative: Caller requesting information about medication during pregnancy; adult is not ill AND triager answers question    Negative: Caller has medication question, adult has minor symptoms, caller declines triage, AND triager answers question    Negative: Caller has medication question only, adult not sick, and triager answers question    Protocols used: MEDICATION QUESTION CALL-A-

## 2021-09-08 ENCOUNTER — OFFICE VISIT (OUTPATIENT)
Dept: INTERNAL MEDICINE | Facility: CLINIC | Age: 41
End: 2021-09-08
Payer: COMMERCIAL

## 2021-09-08 VITALS
RESPIRATION RATE: 18 BRPM | HEART RATE: 111 BPM | SYSTOLIC BLOOD PRESSURE: 130 MMHG | DIASTOLIC BLOOD PRESSURE: 70 MMHG | TEMPERATURE: 97.7 F | HEIGHT: 69 IN | OXYGEN SATURATION: 98 % | BODY MASS INDEX: 43.4 KG/M2 | WEIGHT: 293 LBS

## 2021-09-08 DIAGNOSIS — J45.40 MODERATE PERSISTENT ASTHMA WITHOUT COMPLICATION: ICD-10-CM

## 2021-09-08 DIAGNOSIS — E03.8 OTHER SPECIFIED HYPOTHYROIDISM: ICD-10-CM

## 2021-09-08 DIAGNOSIS — M79.661 PAIN OF RIGHT LOWER LEG: Primary | ICD-10-CM

## 2021-09-08 PROCEDURE — 84439 ASSAY OF FREE THYROXINE: CPT | Performed by: NURSE PRACTITIONER

## 2021-09-08 PROCEDURE — 99214 OFFICE O/P EST MOD 30 MIN: CPT | Performed by: NURSE PRACTITIONER

## 2021-09-08 PROCEDURE — 84443 ASSAY THYROID STIM HORMONE: CPT | Performed by: NURSE PRACTITIONER

## 2021-09-08 PROCEDURE — 36415 COLL VENOUS BLD VENIPUNCTURE: CPT | Performed by: NURSE PRACTITIONER

## 2021-09-08 RX ORDER — ALBUTEROL SULFATE 0.83 MG/ML
2.5 SOLUTION RESPIRATORY (INHALATION) EVERY 6 HOURS PRN
Qty: 30 ML | Refills: 1 | Status: SHIPPED | OUTPATIENT
Start: 2021-09-08 | End: 2021-12-06

## 2021-09-08 RX ORDER — ALBUTEROL SULFATE 90 UG/1
AEROSOL, METERED RESPIRATORY (INHALATION)
Qty: 8.5 G | Refills: 1 | Status: SHIPPED | OUTPATIENT
Start: 2021-09-08 | End: 2021-12-06

## 2021-09-08 RX ORDER — BUDESONIDE AND FORMOTEROL FUMARATE DIHYDRATE 80; 4.5 UG/1; UG/1
2 AEROSOL RESPIRATORY (INHALATION) 2 TIMES DAILY
Qty: 6.9 G | Refills: 1 | Status: SHIPPED | OUTPATIENT
Start: 2021-09-08 | End: 2021-12-06

## 2021-09-08 ASSESSMENT — MIFFLIN-ST. JEOR: SCORE: 2151.41

## 2021-09-08 NOTE — PATIENT INSTRUCTIONS
Medication refills on Albuterol and Symbicort inhaler and nebulizer + ordered new machine (paperwork given)    Ordered Ultrasound of right leg regarding chronic right leg pain to rule out blood clot    Go to [address] for the thyroid lab    Make appointment in 1-2 months with Dr. Andino for physical, pap, and additional fasting bloodwork

## 2021-09-08 NOTE — PROGRESS NOTES
"    Assessment & Plan     Moderate persistent asthma without complication  - intermittent and ran out so refill + new neb machine given  - albuterol (PROAIR HFA/PROVENTIL HFA/VENTOLIN HFA) 108 (90 Base) MCG/ACT inhaler; INHALE TWO PUFFS INTO THE LUNGS EVERY SIX HOURS AS NEEDED FOR SHORTNESS OF BREATH/DYSPNEA  - albuterol (PROVENTIL) (2.5 MG/3ML) 0.083% neb solution; Take 1 vial (2.5 mg) by nebulization every 6 hours as needed for shortness of breath / dyspnea or wheezing  - budesonide-formoterol (SYMBICORT) 80-4.5 MCG/ACT Inhaler; Inhale 2 puffs into the lungs 2 times daily  - Miscellaneous Order for DME - ONLY FOR DME: order for nebulizer with tubing, etc  - if symptoms persist after getting back on her asthma medications, then will consider Allergist referral (patient requesting)    Pain of right lower leg  - etiology unknown but will r/o DVT  - US Lower Extremity Venous Duplex Right; Future  - Tylenol may help with pain    Other specified hypothyroidism  - on Levothyroxine and due for recheck  - TSH with free T4 reflex  :[id number]}     BMI:   Estimated body mass index is 46.3 kg/m  as calculated from the following:    Height as of this encounter: 1.753 m (5' 9\").    Weight as of this encounter: 142.2 kg (313 lb 8 oz).       Patient Instructions   Medication refills on Albuterol and Symbicort inhaler and nebulizer + ordered new machine (paperwork given)    Ordered Ultrasound of right leg regarding chronic right leg pain to rule out blood clot    Go to [address] for the thyroid lab    Make appointment in 1-2 months with Dr. Andino for physical, pap, and additional fasting bloodwork      Return in about 6 weeks (around 10/20/2021) for Routine preventive, in person with PCP Dr. Andino for physical, pap, and fasting blood work.    Shital Gregg [facility]    Ani Ashley is a 41 year old who presents for the following health issues  accompanied by herself and phone " ":    [facility]     Med check.    See above. Actually here for multiple issues:  (1) needing Rx refill on Albuterol and Symbicort inhalers + Albuterol soln along with new nebulizer machine (mine is broken).  Without these my asthma, wheezing, and allergies are bad lately. (2) appears to be due for pending TSH lab by PCP Dr. Andino for f/u on Thyroid with last TSH labs showed 4.35 H (was 9.44 H); and (3) complaining of leg pain especially behind my knee now for over a year and hurts to walk on.    No fever.  Occasional cough and shortness of breathe with activity.  No chest pain.  No stomach or urination issues.    Due for physical, pap, and fasting blood work soon.    Review of Systems   Constitutional, HEENT, cardiovascular, pulmonary, gi and gu systems are negative, except as otherwise noted.      Objective    /70 (BP Location: Left arm, Patient Position: Sitting, Cuff Size: Adult Large)   Pulse 111   Temp 97.7  F (36.5  C) (Tympanic)   Resp 18   Ht 1.753 m (5' 9\")   Wt 142.2 kg (313 lb 8 oz)   LMP 09/03/2021 (Exact Date)   SpO2 98%   BMI 46.30 kg/m    Body mass index is 46.3 kg/m .     Physical Exam   GENERAL: alert and no distress; information given by phone   NECK: no adenopathy, no asymmetry, masses, or scars and thyroid normal to palpation  RESP: lungs clear to auscultation after cough - no rales, rhonchi or wheezes at this time  CV: regular rate and rhythm, normal S1 S2, no S3 or S4, no murmur, click or rub, no peripheral edema and peripheral pulses strong  ABDOMEN: soft, nontender, no hepatosplenomegaly, no masses and bowel sounds normal  MS: no gross musculoskeletal defects noted, no edema; walks with antalgic gait favoring right leg; posterior right knee with tenderness per palpitation but no noted patella or knee swelling although difficult to assess  SKIN: no suspicious lesions or rashes; has some varicosities more on left than right lower leg              "

## 2021-09-09 LAB
T4 FREE SERPL-MCNC: 1.27 NG/DL (ref 0.76–1.46)
TSH SERPL DL<=0.005 MIU/L-ACNC: 5.14 MU/L (ref 0.4–4)

## 2021-10-12 PROCEDURE — 96374 THER/PROPH/DIAG INJ IV PUSH: CPT | Mod: 59

## 2021-10-12 PROCEDURE — 99285 EMERGENCY DEPT VISIT HI MDM: CPT | Mod: 25

## 2021-10-13 ENCOUNTER — HOSPITAL ENCOUNTER (EMERGENCY)
Facility: CLINIC | Age: 41
Discharge: HOME | End: 2021-10-13
Attending: EMERGENCY MEDICINE | Admitting: EMERGENCY MEDICINE
Payer: COMMERCIAL

## 2021-10-13 ENCOUNTER — APPOINTMENT (OUTPATIENT)
Dept: CT IMAGING | Facility: CLINIC | Age: 41
End: 2021-10-13
Attending: EMERGENCY MEDICINE
Payer: COMMERCIAL

## 2021-10-13 VITALS
OXYGEN SATURATION: 98 % | SYSTOLIC BLOOD PRESSURE: 107 MMHG | DIASTOLIC BLOOD PRESSURE: 64 MMHG | RESPIRATION RATE: 22 BRPM | HEART RATE: 70 BPM | TEMPERATURE: 98.7 F

## 2021-10-13 DIAGNOSIS — R10.9 ACUTE RIGHT FLANK PAIN: ICD-10-CM

## 2021-10-13 DIAGNOSIS — R10.31 RLQ ABDOMINAL PAIN: ICD-10-CM

## 2021-10-13 LAB
ALBUMIN SERPL-MCNC: 3.3 G/DL (ref 3.4–5)
ALBUMIN UR-MCNC: NEGATIVE MG/DL
ALP SERPL-CCNC: 92 U/L (ref 40–150)
ALT SERPL W P-5'-P-CCNC: 22 U/L (ref 0–50)
ANION GAP SERPL CALCULATED.3IONS-SCNC: 4 MMOL/L (ref 3–14)
APPEARANCE UR: CLEAR
AST SERPL W P-5'-P-CCNC: 22 U/L (ref 0–45)
BASOPHILS # BLD AUTO: 0 10E3/UL (ref 0–0.2)
BASOPHILS NFR BLD AUTO: 0 %
BILIRUB DIRECT SERPL-MCNC: <0.1 MG/DL (ref 0–0.2)
BILIRUB SERPL-MCNC: 0.3 MG/DL (ref 0.2–1.3)
BILIRUB UR QL STRIP: NEGATIVE
BUN SERPL-MCNC: 11 MG/DL (ref 7–30)
CALCIUM SERPL-MCNC: 8.9 MG/DL (ref 8.5–10.1)
CHLORIDE BLD-SCNC: 109 MMOL/L (ref 94–109)
CO2 SERPL-SCNC: 28 MMOL/L (ref 20–32)
COLOR UR AUTO: YELLOW
CREAT SERPL-MCNC: 0.62 MG/DL (ref 0.52–1.04)
CRP SERPL-MCNC: 55.6 MG/L (ref 0–8)
EOSINOPHIL # BLD AUTO: 0.2 10E3/UL (ref 0–0.7)
EOSINOPHIL NFR BLD AUTO: 2 %
ERYTHROCYTE [DISTWIDTH] IN BLOOD BY AUTOMATED COUNT: 14.9 % (ref 10–15)
GFR SERPL CREATININE-BSD FRML MDRD: >90 ML/MIN/1.73M2
GLUCOSE BLD-MCNC: 103 MG/DL (ref 70–99)
GLUCOSE UR STRIP-MCNC: NEGATIVE MG/DL
HCG SERPL QL: NEGATIVE
HCT VFR BLD AUTO: 43.5 % (ref 35–47)
HGB BLD-MCNC: 13.3 G/DL (ref 11.7–15.7)
HGB UR QL STRIP: NEGATIVE
HOLD SPECIMEN: NORMAL
IMM GRANULOCYTES # BLD: 0.1 10E3/UL
IMM GRANULOCYTES NFR BLD: 1 %
KETONES UR STRIP-MCNC: NEGATIVE MG/DL
LEUKOCYTE ESTERASE UR QL STRIP: ABNORMAL
LIPASE SERPL-CCNC: 121 U/L (ref 73–393)
LYMPHOCYTES # BLD AUTO: 2.5 10E3/UL (ref 0.8–5.3)
LYMPHOCYTES NFR BLD AUTO: 31 %
MCH RBC QN AUTO: 26.2 PG (ref 26.5–33)
MCHC RBC AUTO-ENTMCNC: 30.6 G/DL (ref 31.5–36.5)
MCV RBC AUTO: 86 FL (ref 78–100)
MONOCYTES # BLD AUTO: 0.5 10E3/UL (ref 0–1.3)
MONOCYTES NFR BLD AUTO: 7 %
MUCOUS THREADS #/AREA URNS LPF: PRESENT /LPF
NEUTROPHILS # BLD AUTO: 4.8 10E3/UL (ref 1.6–8.3)
NEUTROPHILS NFR BLD AUTO: 59 %
NITRATE UR QL: NEGATIVE
NRBC # BLD AUTO: 0 10E3/UL
NRBC BLD AUTO-RTO: 0 /100
PH UR STRIP: 7 [PH] (ref 5–7)
PLATELET # BLD AUTO: 309 10E3/UL (ref 150–450)
POTASSIUM BLD-SCNC: 3.8 MMOL/L (ref 3.4–5.3)
PROT SERPL-MCNC: 7.8 G/DL (ref 6.8–8.8)
RBC # BLD AUTO: 5.08 10E6/UL (ref 3.8–5.2)
RBC URINE: <1 /HPF
SODIUM SERPL-SCNC: 141 MMOL/L (ref 133–144)
SP GR UR STRIP: 1.03 (ref 1–1.03)
SQUAMOUS EPITHELIAL: 2 /HPF
UROBILINOGEN UR STRIP-MCNC: NORMAL MG/DL
WBC # BLD AUTO: 8.1 10E3/UL (ref 4–11)
WBC URINE: 3 /HPF

## 2021-10-13 PROCEDURE — 86140 C-REACTIVE PROTEIN: CPT | Performed by: EMERGENCY MEDICINE

## 2021-10-13 PROCEDURE — 85025 COMPLETE CBC W/AUTO DIFF WBC: CPT | Performed by: EMERGENCY MEDICINE

## 2021-10-13 PROCEDURE — 83690 ASSAY OF LIPASE: CPT | Performed by: EMERGENCY MEDICINE

## 2021-10-13 PROCEDURE — 36415 COLL VENOUS BLD VENIPUNCTURE: CPT | Performed by: EMERGENCY MEDICINE

## 2021-10-13 PROCEDURE — 74177 CT ABD & PELVIS W/CONTRAST: CPT

## 2021-10-13 PROCEDURE — 250N000009 HC RX 250: Performed by: EMERGENCY MEDICINE

## 2021-10-13 PROCEDURE — 81001 URINALYSIS AUTO W/SCOPE: CPT | Performed by: EMERGENCY MEDICINE

## 2021-10-13 PROCEDURE — 250N000011 HC RX IP 250 OP 636: Performed by: EMERGENCY MEDICINE

## 2021-10-13 PROCEDURE — 84703 CHORIONIC GONADOTROPIN ASSAY: CPT | Performed by: EMERGENCY MEDICINE

## 2021-10-13 PROCEDURE — 80048 BASIC METABOLIC PNL TOTAL CA: CPT | Performed by: EMERGENCY MEDICINE

## 2021-10-13 PROCEDURE — 82248 BILIRUBIN DIRECT: CPT | Performed by: EMERGENCY MEDICINE

## 2021-10-13 RX ORDER — OXYCODONE HYDROCHLORIDE 5 MG/1
5 TABLET ORAL EVERY 6 HOURS PRN
Qty: 6 TABLET | Refills: 0 | Status: SHIPPED | OUTPATIENT
Start: 2021-10-13 | End: 2021-12-06

## 2021-10-13 RX ORDER — IOPAMIDOL 755 MG/ML
500 INJECTION, SOLUTION INTRAVASCULAR ONCE
Status: COMPLETED | OUTPATIENT
Start: 2021-10-13 | End: 2021-10-13

## 2021-10-13 RX ORDER — MORPHINE SULFATE 4 MG/ML
4 INJECTION, SOLUTION INTRAMUSCULAR; INTRAVENOUS ONCE
Status: COMPLETED | OUTPATIENT
Start: 2021-10-13 | End: 2021-10-13

## 2021-10-13 RX ORDER — SIMETHICONE 125 MG
125 TABLET,CHEWABLE ORAL 2 TIMES DAILY
Qty: 20 TABLET | Refills: 0 | Status: SHIPPED | OUTPATIENT
Start: 2021-10-13 | End: 2021-12-06

## 2021-10-13 RX ORDER — CYCLOBENZAPRINE HCL 10 MG
10 TABLET ORAL 3 TIMES DAILY PRN
Qty: 20 TABLET | Refills: 0 | Status: SHIPPED | OUTPATIENT
Start: 2021-10-13 | End: 2021-10-19

## 2021-10-13 RX ORDER — DICYCLOMINE HCL 20 MG
20 TABLET ORAL 2 TIMES DAILY
Qty: 20 TABLET | Refills: 0 | Status: SHIPPED | OUTPATIENT
Start: 2021-10-13 | End: 2021-10-23

## 2021-10-13 RX ORDER — LIDOCAINE 50 MG/G
1 PATCH TOPICAL EVERY 24 HOURS
Qty: 10 PATCH | Refills: 0 | Status: SHIPPED | OUTPATIENT
Start: 2021-10-13 | End: 2021-10-23

## 2021-10-13 RX ADMIN — MORPHINE SULFATE 4 MG: 4 INJECTION INTRAVENOUS at 01:22

## 2021-10-13 RX ADMIN — SODIUM CHLORIDE 65 ML: 9 INJECTION, SOLUTION INTRAVENOUS at 02:26

## 2021-10-13 RX ADMIN — IOPAMIDOL 100 ML: 755 INJECTION, SOLUTION INTRAVENOUS at 02:26

## 2021-10-13 ASSESSMENT — ENCOUNTER SYMPTOMS
SHORTNESS OF BREATH: 0
FLANK PAIN: 1

## 2021-10-13 NOTE — ED TRIAGE NOTES
Right sided flank pain since yesterday. Denies pain like this before, denies hx of kidney stones and denies painful urination. VSS on RA. Rates pain 10/10.

## 2021-10-13 NOTE — ED PROVIDER NOTES
History     Chief Complaint:  Flank Pain    The history is provided by the patient and a relative. A  was used (Sister, Diane).      Gabi Roblero is a 41 year old female with history of obesity, asthma who presents with 2 days of right flank pain. She took Tylenol yesterday which helped for a few hours but the pain persisted into today. Her pain increases with inspiration, twisting or bending motions. No associated shortness of breath. She does have a history of a hernia. She denies history of blood clots or recent surgeries in the last 3 months. History of cholecystectomy over 15 years ago.    Review of Systems   Respiratory: Negative for shortness of breath.    Genitourinary: Positive for flank pain.   All other systems reviewed and are negative.    Allergies:  Naproxen    Medications:  Albuterol  Symbicort  Epinephrine  Levothyroxine  Omeprazole    Past Medical History:     GERD  Hypothyroidism  Asthma  Vitamin D deficiency   Obesity   Rotator cuff syndrome  Hernia     Past Surgical History:    Cholecystectomy    Family History:    Father: diabetes mellitus    Social History:  Presents with her sister.  Presents via car.    Physical Exam     Patient Vitals for the past 24 hrs:   BP Temp Temp src Pulse Resp SpO2   10/13/21 0330 107/64 -- -- 70 -- 98 %   10/13/21 0315 118/65 -- -- 74 -- 95 %   10/13/21 0300 114/67 -- -- 71 -- 96 %   10/13/21 0245 114/67 -- -- 74 -- 95 %   10/13/21 0240 127/61 -- -- 76 -- 97 %   10/13/21 0200 -- -- -- -- -- 95 %   10/12/21 2328 (!) 164/97 98.7  F (37.1  C) Temporal 87 22 98 %       Physical Exam  Vitals reviewed.   Constitutional:       Appearance: She is obese.   HENT:      Right Ear: Tympanic membrane normal.      Left Ear: Tympanic membrane normal.   Cardiovascular:      Rate and Rhythm: Normal rate and regular rhythm.   Pulmonary:      Effort: Pulmonary effort is normal.      Breath sounds: Normal breath sounds.   Abdominal:      General: Abdomen is  flat.      Comments: Pain localizes to the right flank and is reproducibly tender in the right mid abdomen.  No clear abdominal hernias palpated patient is obese with a distended abdomen consistent with obesity.  No ascitic wave.  No guarding or rebound.   Skin:     General: Skin is warm.      Capillary Refill: Capillary refill takes less than 2 seconds.   Neurological:      General: No focal deficit present.      Mental Status: She is alert and oriented to person, place, and time.   Psychiatric:         Mood and Affect: Mood normal.           Emergency Department Course     Imaging:  CT Abdomen Pelvis w Contrast   Final Result   IMPRESSION:    1.  Fluid and gas filled colon to the rectum. No bowel obstruction or inflammation.   2.  Diastasis of the rectus abdominis muscles and a small umbilical hernia containing fat. No interval change.        Report per radiology    Laboratory:  Labs Ordered and Resulted from Time of ED Arrival Up to the Time of Departure from the ED   BASIC METABOLIC PANEL - Abnormal; Notable for the following components:       Result Value    Glucose 103 (*)     All other components within normal limits   ROUTINE UA WITH MICROSCOPIC REFLEX TO CULTURE - Abnormal; Notable for the following components:    Leukocyte Esterase Urine Trace (*)     Mucus Urine Present (*)     Squamous Epithelials Urine 2 (*)     All other components within normal limits    Narrative:     Urine Culture not indicated   CBC WITH PLATELETS AND DIFFERENTIAL - Abnormal; Notable for the following components:    MCH 26.2 (*)     MCHC 30.6 (*)     Absolute Immature Granulocytes 0.1 (*)     All other components within normal limits   HEPATIC FUNCTION PANEL - Abnormal; Notable for the following components:    Albumin 3.3 (*)     All other components within normal limits   CRP INFLAMMATION - Abnormal; Notable for the following components:    CRP Inflammation 55.6 (*)     All other components within normal limits   HCG QUALITATIVE  PREGNANCY - Normal   LIPASE - Normal   EXTRA RED TOP TUBE   EXTRA GREEN TOP (LITHIUM HEPARIN) TUBE   EXTRA PURPLE TOP TUBE   PERIPHERAL IV CATHETER   CBC WITH PLATELETS & DIFFERENTIAL    Narrative:     The following orders were created for panel order CBC with Platelets & Differential.  Procedure                               Abnormality         Status                     ---------                               -----------         ------                     CBC with platelets and d...[[id number]]  Abnormal            Final result                 Please view results for these tests on the individual orders.   EXTRA TUBE    Narrative:     The following orders were created for panel order Extra Tube ([place] Draw).  Procedure                               Abnormality         Status                     ---------                               -----------         ------                     Extra Red Top Tube[[id number]]                               Final result               Extra Green Top (Lithium...[[id number]]                      Final result               Extra Purple Top Tube[[id number]]                            Final result                 Please view results for these tests on the individual orders.      Emergency Department Course:    Reviewed:  I reviewed nursing notes, vitals, past medical history and Care Everywhere    Assessments:  0020 I obtained history and examined the patient as noted above.   0322 I rechecked the patient and explained findings.     Interventions:  0122 Morphine 4 mg IV    Disposition:  The patient was discharged to home.     Impression & Plan     Medical Decision Making:  Patient presents with right flank and right-sided abdominal pain.  No shortness of breath no clinical concerns for primary lung pathology.  Urinalysis is negative for infection.  Patient gives a history of possible kidney stones but no clear bloody urine.  Ultimately CT scan was ordered at patient's sister's request due  to concerns for severe abdominal pain without clear cause.  CT is negative for acute findings that does demonstrate a severe and stool-filled colon.  Patient is on MiraLAX.  Wonder about ileus.  Patient is discharged with medication for pain and follow-up with primary care.  No need for admission was identified.  Pain at times seem to be more muscular and worse when she moved there for Flexeril was also provided for muscular pain.      Diagnosis:    ICD-10-CM    1. Acute right flank pain  R10.9    2. RLQ abdominal pain  R10.31        Discharge Medications:  New Prescriptions    CYCLOBENZAPRINE (FLEXERIL) 10 MG TABLET    Take 1 tablet (10 mg) by mouth 3 times daily as needed for muscle spasms    DICYCLOMINE (BENTYL) 20 MG TABLET    Take 1 tablet (20 mg) by mouth 2 times daily for 10 days    LIDOCAINE (LIDODERM) 5 % PATCH    Place 1 patch onto the skin every 24 hours for 10 days Place to right back    OXYCODONE (ROXICODONE) 5 MG TABLET    Take 1 tablet (5 mg) by mouth every 6 hours as needed for pain    SIMETHICONE (MYLICON) 125 MG CHEWABLE TABLET    Take 1 tablet (125 mg) by mouth 2 times daily       Scribe Disclosure:  I, Janell Panchal, am serving as a scribe at 12:16 AM on 10/13/2021 to document services personally performed by Calvin Vega MD based on my observations and the provider's statements to me.     Calvin Vega MD  10/14/21 [id number]

## 2021-10-13 NOTE — DISCHARGE INSTRUCTIONS
At your request we have done both lab work and imaging to assess right sided abdominal pain.  There is no signs of a kidney stone kidney infection liver problem.  As we have discussed there is some gas and distention of the colon.  Please use Bentyl and simethicone for gas.  Use Flexeril and Acacian for pain as needed.  Please follow-up with your regular doctor for further assessment as well as the general surgeon if you are continually concerned about your right small incisional hernia.  Return to the emergency room with fever shortness of breath or severe increase in pain not tolerable with the medication as provided.

## 2021-11-02 DIAGNOSIS — E03.8 OTHER SPECIFIED HYPOTHYROIDISM: ICD-10-CM

## 2021-11-02 NOTE — LETTER
[facility] [address], [address]  [place], [place]  [address]                                            TEL:[phone]  FAX:[phone]      Gabi Roblero  [address] [facility] [address]      November 4, 2021    Dear Gabi,    We are concerned about your health care.  We recently provided you with a medication refill.  Many medications require routine follow-up with your provider.      At this time we ask that: You schedule an appointment for your annual physical.    Your prescription: Has been refilled for 1 month so you may have time for the above noted follow-up.      Thank you,      Omar Andino M.D.

## 2021-11-03 NOTE — TELEPHONE ENCOUNTER
TSH   Date Value Ref Range Status   09/08/2021 5.14 (H) 0.40 - 4.00 mU/L Final   02/24/2021 4.35 (H) 0.40 - 4.00 mU/L Final     Please refill as appropriate.  Thank you,    Minal Thomas RN  [facility]

## 2021-11-04 RX ORDER — LEVOTHYROXINE SODIUM 150 UG/1
TABLET ORAL
Qty: 60 TABLET | Refills: 0 | Status: SHIPPED | OUTPATIENT
Start: 2021-11-04 | End: 2021-12-06

## 2021-11-04 NOTE — TELEPHONE ENCOUNTER
Pt is due for pap , physical, ACT, and also labs, please advise in clinic appt , I have openings next month. I refilled for now, please schedule appt

## 2021-11-15 ENCOUNTER — NURSE TRIAGE (OUTPATIENT)
Dept: INTERNAL MEDICINE | Facility: CLINIC | Age: 41
End: 2021-11-15
Payer: COMMERCIAL

## 2021-11-15 NOTE — TELEPHONE ENCOUNTER
"Patient calling with [facility] . Patient states she has an allergic reaction. Reports she \"went to ED and given medication.\" Patient reports she \"finished medications.\" When RN/writer inquired further, patient reports the last allergic reaction occurred \"7 months ago\".  Last ED documentation for allergic reaction is from 1/2/2021.     Patient reports last night her \"whole body was swollen.\"  Patient initially states \"I'm feeling better now, but my mouth and lips are still swollen.\" Patient also initially stated she had some trouble swallowing. This RN then advised her to call 911. Patient then stated she does not have any trouble swallowing and only a little bit of swelling remains. Patient then denied any swelling, rash, difficulty breathing, or swallowing, or itching. Patient wants to be seen today. No appointments available. Recommend patient be seen in Urgent Care.      Reason for Disposition    Patient wants to be seen    Additional Information    Negative: Life-threatening reaction in the past to similar substance (e.g., food, insect bite/sting, medication, etc.) and < 2 hours since exposure    Negative: Wheezing, stridor, hoarseness, or difficulty breathing    Negative: Tightness in the chest or throat and begins within 2 hours of exposure to allergic substance    Negative: Difficulty swallowing, drooling, or slurred speech    Negative: Difficult to awaken or acting confused (e.g., disoriented, slurred speech)    Negative: Unresponsive, passed out, or very weak    Negative: Other symptom of severe allergic reaction (Exception: Hives or facial swelling alone)    Negative: Sounds like a life-threatening emergency to the triager    Negative: Widespread hives and onset > 2 hours after exposure to high-risk allergen (e.g., sting, nuts, 1st dose of antibiotic)    Negative: Widespread itching and onset > 2 hours after exposure to high-risk allergen (e.g., sting, nuts, 1st dose of antibiotic)    Negative: " "Face swelling and onset > 2 hours after exposure to high-risk allergen (e.g., sting, nuts, 1st dose of antibiotic)    Negative: Widespread hives, itching or facial swelling and onset < 2 hours of exposure to high-risk allergen (e.g., sting, nuts, 1st dose of antibiotic)    Negative: Vomiting or abdominal cramps and onset < 2 hours of exposure to high-risk allergen (e.g., sting, nuts, 1st dose of antibiotic)    Negative: Gave epinephrine shot and no symptoms now    Negative: Gave asthma inhaler or neb and no symptoms now    Answer Assessment - Initial Assessment Questions  1. MAIN SYMPTOM: \"What is your main symptom?\" \"How bad is it?\"        Reports last night her whole body was swollen, including her throat. Mouth and lips still swollen per patient. Patient then states that the swelling is now gone.  2. RESPIRATORY STATUS: \"Are you having difficulty breathing?\"  (e.g., yes/no, wheezing, unable to complete a sentence)       No difficulty breathing. Denies wheezing.   3. SWALLOWING: \"Can you swallow?\" (e.g., yes/no, food, fluid, saliva)       Patient initially stated she had difficulty swallowing but now states she is okay swallowing.  4. VASCULAR STATUS: \"Are you feeling weak?\" If so, ask: \"Can you stand and walk normally?\"      Denies weakness, states she can walk and stand normally.  5. ONSET: \"When did the reaction start?\" (Minutes or hours ago)       Last night -woke up around 2 or 3 am.  6. SUBSTANCE: \"What are you reacting to?\" \"When did the contact occur?\"       Patient not sure what she is reacting to. States she hasn't eaten anything.   7. PREVIOUS REACTION: \"Have you ever reacted to it before?\" If so, ask: \"What happened that time?\"      ED 1/2/2021 for allergic reaction - reaction to a medication patient states. Patient unsure which medication.  8. EPINEPHRINE: \"Do you have an epinephrine autoinjector (e.g., EpiPen)?\"      Was only given 1 Epi-Pen and states she has already used it. Used it about 2 months " ago per patient.    Protocols used: [id number]    Annabella GRIFFIN RN   [facility]

## 2021-12-03 DIAGNOSIS — J45.40 MODERATE PERSISTENT ASTHMA WITHOUT COMPLICATION: ICD-10-CM

## 2021-12-06 ENCOUNTER — OFFICE VISIT (OUTPATIENT)
Dept: INTERNAL MEDICINE | Facility: CLINIC | Age: 41
End: 2021-12-06
Payer: COMMERCIAL

## 2021-12-06 VITALS
TEMPERATURE: 98.2 F | SYSTOLIC BLOOD PRESSURE: 126 MMHG | HEIGHT: 69 IN | HEART RATE: 101 BPM | DIASTOLIC BLOOD PRESSURE: 72 MMHG | BODY MASS INDEX: 43.4 KG/M2 | OXYGEN SATURATION: 93 % | RESPIRATION RATE: 18 BRPM | WEIGHT: 293 LBS

## 2021-12-06 DIAGNOSIS — E03.8 OTHER SPECIFIED HYPOTHYROIDISM: ICD-10-CM

## 2021-12-06 DIAGNOSIS — J45.41 MODERATE PERSISTENT ASTHMA WITH EXACERBATION: Primary | ICD-10-CM

## 2021-12-06 DIAGNOSIS — K21.9 GASTROESOPHAGEAL REFLUX DISEASE WITHOUT ESOPHAGITIS: ICD-10-CM

## 2021-12-06 DIAGNOSIS — J45.40 MODERATE PERSISTENT ASTHMA WITHOUT COMPLICATION: ICD-10-CM

## 2021-12-06 PROCEDURE — 99214 OFFICE O/P EST MOD 30 MIN: CPT | Performed by: NURSE PRACTITIONER

## 2021-12-06 RX ORDER — BUDESONIDE AND FORMOTEROL FUMARATE DIHYDRATE 80; 4.5 UG/1; UG/1
2 AEROSOL RESPIRATORY (INHALATION) 2 TIMES DAILY
Qty: 6.9 G | Refills: 1 | Status: SHIPPED | OUTPATIENT
Start: 2021-12-06 | End: 2022-01-20

## 2021-12-06 RX ORDER — ALBUTEROL SULFATE 90 UG/1
AEROSOL, METERED RESPIRATORY (INHALATION)
Qty: 18 G | Refills: 0 | OUTPATIENT
Start: 2021-12-06

## 2021-12-06 RX ORDER — OMEPRAZOLE 40 MG/1
40 CAPSULE, DELAYED RELEASE ORAL DAILY
Qty: 90 CAPSULE | Refills: 0 | Status: SHIPPED | OUTPATIENT
Start: 2021-12-06 | End: 2022-05-18

## 2021-12-06 RX ORDER — ALBUTEROL SULFATE 0.83 MG/ML
2.5 SOLUTION RESPIRATORY (INHALATION) EVERY 6 HOURS PRN
Qty: 30 ML | Refills: 1 | Status: SHIPPED | OUTPATIENT
Start: 2021-12-06 | End: 2022-05-18

## 2021-12-06 RX ORDER — DILTIAZEM HYDROCHLORIDE 60 MG/1
TABLET, FILM COATED ORAL
Qty: 10.2 G | Refills: 1 | OUTPATIENT
Start: 2021-12-06

## 2021-12-06 RX ORDER — ALBUTEROL SULFATE 90 UG/1
AEROSOL, METERED RESPIRATORY (INHALATION)
Qty: 8.5 G | Refills: 1 | Status: SHIPPED | OUTPATIENT
Start: 2021-12-06 | End: 2022-02-14

## 2021-12-06 RX ORDER — PREDNISONE 20 MG/1
TABLET ORAL
Qty: 20 TABLET | Refills: 0 | Status: SHIPPED | OUTPATIENT
Start: 2021-12-06 | End: 2022-02-14

## 2021-12-06 RX ORDER — LEVOTHYROXINE SODIUM 150 UG/1
TABLET ORAL
Qty: 60 TABLET | Refills: 1 | Status: SHIPPED | OUTPATIENT
Start: 2021-12-06 | End: 2022-02-17

## 2021-12-06 ASSESSMENT — ASTHMA QUESTIONNAIRES
QUESTION_5 LAST FOUR WEEKS HOW WOULD YOU RATE YOUR ASTHMA CONTROL: NOT CONTROLLED AT ALL
ACT_TOTALSCORE: 9
QUESTION_4 LAST FOUR WEEKS HOW OFTEN HAVE YOU USED YOUR RESCUE INHALER OR NEBULIZER MEDICATION (SUCH AS ALBUTEROL): THREE OR MORE TIMES PER DAY
QUESTION_1 LAST FOUR WEEKS HOW MUCH OF THE TIME DID YOUR ASTHMA KEEP YOU FROM GETTING AS MUCH DONE AT WORK, SCHOOL OR AT HOME: MOST OF THE TIME
QUESTION_2 LAST FOUR WEEKS HOW OFTEN HAVE YOU HAD SHORTNESS OF BREATH: ONCE OR TWICE A WEEK
QUESTION_3 LAST FOUR WEEKS HOW OFTEN DID YOUR ASTHMA SYMPTOMS (WHEEZING, COUGHING, SHORTNESS OF BREATH, CHEST TIGHTNESS OR PAIN) WAKE YOU UP AT NIGHT OR EARLIER THAN USUAL IN THE MORNING: FOUR OR MORE NIGHTS A WEEK

## 2021-12-06 ASSESSMENT — MIFFLIN-ST. JEOR: SCORE: 2158.21

## 2021-12-06 NOTE — PATIENT INSTRUCTIONS
Refilled all medication x 60 days as due for physical exam with labs.    For the asthma flare-up, will treat with Prednisone taper dose as directed.  Restart Albuterol inhaler and neb treatments during this flare-up along with Symbicort inhaler.    Fluids and rest.    Go to [address] for thyroid lab today    Follow up with PCP Dr. Andino for physical to include pap and mammogram; come in fasting for labs    If no improvement or worsens, go to ER.  If start coughing yellowish secretions, notify the clinic and will consider antibiotic.

## 2021-12-06 NOTE — PROGRESS NOTES
"  Assessment & Plan     Moderate persistent asthma with exacerbation  - will treat flare-up with medication:  - predniSONE (DELTASONE) 20 MG tablet; Take 3 tabs by mouth daily x 3 days, then 2 tabs daily x 3 days, then 1 tab daily x 3 days, then 1/2 tab daily x 3 days.  - if symptoms worsen go to ER.  If secretions become discolored, notify the clinic and will prescribe an antibiotic.    Moderate persistent asthma without complication  - Rx refills for now:  - albuterol (PROAIR HFA/PROVENTIL HFA/VENTOLIN HFA) 108 (90 Base) MCG/ACT inhaler; INHALE TWO PUFFS INTO THE LUNGS EVERY SIX HOURS AS NEEDED FOR SHORTNESS OF BREATH/DYSPNEA  - albuterol (PROVENTIL) (2.5 MG/3ML) 0.083% neb solution; Take 1 vial (2.5 mg) by nebulization every 6 hours as needed for shortness of breath / dyspnea or wheezing  - budesonide-formoterol (SYMBICORT) 80-4.5 MCG/ACT Inhaler; Inhale 2 puffs into the lungs 2 times daily    Other specified hypothyroidism  - Rx refill for now but patient had run out so don't know how valid the results will be:  - levothyroxine (SYNTHROID/LEVOTHROID) 150 MCG tablet; TAKE 1 TABLET DAILY AND TAKE EXTRA 1/2 TABLET ONCE A WEEK  - TSH with free T4 reflex; Future    Gastroesophageal reflux disease without esophagitis  - intermittent with Rx refill:  - omeprazole (PRILOSEC) 40 MG DR capsule; Take 1 capsule (40 mg) by mouth daily    0956}     BMI:   Estimated body mass index is 46.52 kg/m  as calculated from the following:    Height as of this encounter: 1.753 m (5' 9\").    Weight as of this encounter: 142.9 kg (315 lb).     37795}  Patient Instructions   Refilled all medication x 60 days as due for physical exam with labs.    For the asthma flare-up, will treat with Prednisone taper dose as directed.  Restart Albuterol inhaler and neb treatments during this flare-up along with Symbicort inhaler.    Fluids and rest.    Go to [address] for thyroid lab today    Follow up with PCP Dr. Andino for physical to include pap " "and mammogram; come in fasting for labs    If no improvement or worsens, go to ER.  If start coughing yellowish secretions, notify the clinic and will consider antibiotic.      Return in about 6 weeks (around 1/17/2022) for Routine preventive, in person with PCP Dr. Andino and fasting labs.    Shital Gregg CNP  [facility] [place] Ashley is a 41 year old who presents for the following health issues  accompanied by her sister. Follow up medications. ACT score is 9. Patient requesting refills.    HPI     Patient here for: ran out of all medications so needs refills. Due for physical and pap with PCP Dr. Andino.  Also complaining of asthma flare-up x 1 month and not getting any better.  Thinks it is due to running out of my inhalers.  Asking for a TSH lab since recent lab results have been abnormal.  Information gathered through  and sister.    No fever  Harsh cough with wheezing.  Some shortness of breathe but no chest pain.  No discolored phelgm.  No stomach or urination issues.    Review of Systems   Constitutional, HEENT, cardiovascular, pulmonary, gi and gu systems are negative, except as otherwise noted.      Objective    /72   Pulse 101   Temp 98.2  F (36.8  C) (Oral)   Resp 18   Ht 1.753 m (5' 9\")   Wt 142.9 kg (315 lb)   LMP 11/16/2021 (Approximate)   SpO2 93%   Breastfeeding No   BMI 46.52 kg/m    Body mass index is 46.52 kg/m .     Physical Exam   GENERAL: alert and no distress; accompanied by sister and   NECK: no adenopathy, no asymmetry, masses, or scars and thyroid normal to palpation  RESP: lungs with expiratory wheezing and harsh cough to auscultation - no rales, rhonchi or wheezes  CV: regular rate and rhythm, normal S1 S2, no S3 or S4, no murmur, click or rub, no peripheral edema and peripheral pulses strong  ABDOMEN: soft, nontender, no hepatosplenomegaly, no masses and bowel sounds normal  MS: no gross musculoskeletal defects " noted, no edema  SKIN: no suspicious lesions or rashes

## 2021-12-07 ASSESSMENT — ASTHMA QUESTIONNAIRES: ACT_TOTALSCORE: 9

## 2022-02-14 ENCOUNTER — OFFICE VISIT (OUTPATIENT)
Dept: INTERNAL MEDICINE | Facility: CLINIC | Age: 42
End: 2022-02-14
Payer: COMMERCIAL

## 2022-02-14 VITALS
WEIGHT: 293 LBS | BODY MASS INDEX: 43.4 KG/M2 | OXYGEN SATURATION: 98 % | DIASTOLIC BLOOD PRESSURE: 80 MMHG | HEIGHT: 69 IN | HEART RATE: 96 BPM | TEMPERATURE: 96.9 F | SYSTOLIC BLOOD PRESSURE: 132 MMHG | RESPIRATION RATE: 16 BRPM

## 2022-02-14 DIAGNOSIS — J45.40 MODERATE PERSISTENT ASTHMA WITHOUT COMPLICATION: ICD-10-CM

## 2022-02-14 DIAGNOSIS — E03.8 OTHER SPECIFIED HYPOTHYROIDISM: ICD-10-CM

## 2022-02-14 DIAGNOSIS — E66.01 MORBID OBESITY (H): ICD-10-CM

## 2022-02-14 DIAGNOSIS — Z00.00 ROUTINE HISTORY AND PHYSICAL EXAMINATION OF ADULT: Primary | ICD-10-CM

## 2022-02-14 DIAGNOSIS — Z23 HIGH PRIORITY FOR 2019-NCOV VACCINE: ICD-10-CM

## 2022-02-14 LAB — HGB BLD-MCNC: 13.9 G/DL (ref 11.7–15.7)

## 2022-02-14 PROCEDURE — 87624 HPV HI-RISK TYP POOLED RSLT: CPT | Performed by: INTERNAL MEDICINE

## 2022-02-14 PROCEDURE — 99396 PREV VISIT EST AGE 40-64: CPT | Performed by: INTERNAL MEDICINE

## 2022-02-14 PROCEDURE — 84439 ASSAY OF FREE THYROXINE: CPT | Performed by: INTERNAL MEDICINE

## 2022-02-14 PROCEDURE — 80053 COMPREHEN METABOLIC PANEL: CPT | Performed by: INTERNAL MEDICINE

## 2022-02-14 PROCEDURE — 0064A COVID-19,PF,MODERNA (18+ YRS BOOSTER .25ML): CPT | Performed by: INTERNAL MEDICINE

## 2022-02-14 PROCEDURE — 85018 HEMOGLOBIN: CPT | Performed by: INTERNAL MEDICINE

## 2022-02-14 PROCEDURE — 84443 ASSAY THYROID STIM HORMONE: CPT | Performed by: INTERNAL MEDICINE

## 2022-02-14 PROCEDURE — 36415 COLL VENOUS BLD VENIPUNCTURE: CPT | Performed by: INTERNAL MEDICINE

## 2022-02-14 PROCEDURE — G0145 SCR C/V CYTO,THINLAYER,RESCR: HCPCS | Performed by: INTERNAL MEDICINE

## 2022-02-14 PROCEDURE — 91306 COVID-19,PF,MODERNA (18+ YRS BOOSTER .25ML): CPT | Performed by: INTERNAL MEDICINE

## 2022-02-14 PROCEDURE — 99214 OFFICE O/P EST MOD 30 MIN: CPT | Mod: 25 | Performed by: INTERNAL MEDICINE

## 2022-02-14 PROCEDURE — 80061 LIPID PANEL: CPT | Performed by: INTERNAL MEDICINE

## 2022-02-14 RX ORDER — BUDESONIDE AND FORMOTEROL FUMARATE DIHYDRATE 80; 4.5 UG/1; UG/1
2 AEROSOL RESPIRATORY (INHALATION) 2 TIMES DAILY
Qty: 10.2 G | Refills: 6 | Status: SHIPPED | OUTPATIENT
Start: 2022-02-14 | End: 2022-05-18

## 2022-02-14 RX ORDER — ALBUTEROL SULFATE 90 UG/1
1-2 AEROSOL, METERED RESPIRATORY (INHALATION) EVERY 6 HOURS PRN
Qty: 18 G | Refills: 3 | Status: SHIPPED | OUTPATIENT
Start: 2022-02-14 | End: 2022-05-18

## 2022-02-14 ASSESSMENT — ENCOUNTER SYMPTOMS
CHILLS: 0
HEARTBURN: 0
PARESTHESIAS: 0
HEMATOCHEZIA: 0
FREQUENCY: 0
NAUSEA: 0
HEADACHES: 0
DIARRHEA: 0
SHORTNESS OF BREATH: 0
PALPITATIONS: 0
BREAST MASS: 0
SORE THROAT: 0
DYSURIA: 0
JOINT SWELLING: 0
HEMATURIA: 0
MYALGIAS: 0
WEAKNESS: 0
DIZZINESS: 0
NERVOUS/ANXIOUS: 0
CONSTIPATION: 0
EYE PAIN: 0
COUGH: 0
FEVER: 0
ARTHRALGIAS: 0
ABDOMINAL PAIN: 0

## 2022-02-14 ASSESSMENT — ASTHMA QUESTIONNAIRES: ACT_TOTALSCORE: 20

## 2022-02-14 ASSESSMENT — MIFFLIN-ST. JEOR: SCORE: 2139.15

## 2022-02-14 NOTE — PROGRESS NOTES
SUBJECTIVE:   CC: Gabi Roblero is an 42 year old woman who presents for preventive health visit.       Patient has been advised of split billing requirements and indicates understanding: Yes  Healthy Habits:     Getting at least 3 servings of Calcium per day:  Yes    Bi-annual eye exam:  NO    Dental care twice a year:  Yes    Sleep apnea or symptoms of sleep apnea:  None    Diet:  Regular (no restrictions)    Frequency of exercise:  None    Taking medications regularly:  Yes    Medication side effects:  None    PHQ-2 Total Score: 0    Additional concerns today:  Yes       Asthma Follow-Up-      Was ACT completed today?    Yes    ACT Total Scores 2/14/2022   ACT TOTAL SCORE -   ASTHMA ER VISITS -   ASTHMA HOSPITALIZATIONS -   ACT TOTAL SCORE (Goal Greater than or Equal to 20) 20   In the past 12 months, how many times did you visit the emergency room for your asthma without being admitted to the hospital? 0   In the past 12 months, how many times were you hospitalized overnight because of your asthma? 0          How many days per week do you miss taking your asthma controller medication?  0    Please describe any recent triggers for your asthma: Patient is unaware of triggers    Have you had any Emergency Room Visits, Urgent Care Visits, or Hospital Admissions since your last office visit?  No    Hypothyroidism Follow-up      Since last visit, patient describes the following symptoms: Weight stable, no hair loss, no skin changes, no constipation, no loose stools      Today's PHQ-2 Score:   PHQ-2 ( 1999 Pfizer) 2/14/2022   Q1: Little interest or pleasure in doing things 0   Q2: Feeling down, depressed or hopeless 0   PHQ-2 Score 0   PHQ-2 Total Score (12-17 Years)- Positive if 3 or more points; Administer PHQ-A if positive -   Q1: Little interest or pleasure in doing things Not at all   Q2: Feeling down, depressed or hopeless Not at all   PHQ-2 Score 0       Abuse: Current or Past (Physical, Sexual or  Emotional) - No  Do you feel safe in your environment? Yes    Past Medical History:   Diagnosis Date     GERD (gastroesophageal reflux disease)      Hypothyroidism      Moderate persistent asthma 4/28/2016       Past Surgical History:   Procedure Laterality Date     CHOLECYSTECTOMY  2003    open, in [place]       Current Outpatient Medications   Medication Sig Dispense Refill     acetaminophen (TYLENOL) 325 MG tablet Take 2 tablets (650 mg) by mouth every 4 hours as needed for mild pain or fever (greater than or equal to 38  C /100.4  F (oral) or 38.5  C/ 101.4  F (core).) 100 tablet 1     albuterol (PROVENTIL) (2.5 MG/3ML) 0.083% neb solution Take 1 vial (2.5 mg) by nebulization every 6 hours as needed for shortness of breath / dyspnea or wheezing 30 mL 1     EPINEPHrine (ANY BX GENERIC EQUIV) 0.3 MG/0.3ML injection 2-pack Inject 0.3 mLs (0.3 mg) into the muscle once as needed for anaphylaxis 1 each 1     levothyroxine (SYNTHROID/LEVOTHROID) 150 MCG tablet TAKE 1 TABLET DAILY AND TAKE EXTRA 1/2 TABLET ONCE A WEEK 60 tablet 1     Lidocaine (LIDOCARE) 4 % Patch Place 1 patch onto the skin every 24 hours To prevent lidocaine toxicity, patient should be patch free for 12 hrs daily. 10 patch 0     omeprazole (PRILOSEC) 40 MG DR capsule Take 1 capsule (40 mg) by mouth daily 90 capsule 0     order for DME Equipment being ordered: Nebulizer 1 Device 0     SYMBICORT 80-4.5 MCG/ACT Inhaler INHALE 2 PUFFS INTO THE LUNGS 2 TIMES DAILY 10.2 g 0     albuterol (PROAIR HFA/PROVENTIL HFA/VENTOLIN HFA) 108 (90 Base) MCG/ACT inhaler INHALE TWO PUFFS INTO THE LUNGS EVERY SIX HOURS AS NEEDED FOR SHORTNESS OF BREATH/DYSPNEA (Patient not taking: Reported on 2/14/2022) 8.5 g 1       Family History   Problem Relation Age of Onset     Diabetes Father      Family History Negative Mother      Cancer No family hx of      Cerebrovascular Disease No family hx of      Hypertension No family hx of      C.A.D. No family hx of      Asthma No family hx  of          Social History     Tobacco Use     Smoking status: Never Smoker     Smokeless tobacco: Never Used   Substance Use Topics     Alcohol use: No     Alcohol/week: 0.0 standard drinks     If you drink alcohol do you typically have >3 drinks per day or >7 drinks per week? No    Alcohol Use 2/14/2022   Prescreen: >3 drinks/day or >7 drinks/week? Not Applicable   Prescreen: >3 drinks/day or >7 drinks/week? -   No flowsheet data found.    Reviewed orders with patient.  Reviewed health maintenance and updated orders accordingly - Yes      Breast Cancer Screening:     Mammogram Screening - Offered annual screening and updated Health Maintenance for mutual plan based on risk factor consideration       History of abnormal Pap smear: NO - age 30-65 PAP every 5 years with negative HPV co-testing recommended  PAP / HPV Latest Ref Rng & Units 10/16/2015 7/30/2012   PAP (Historical) - NIL NIL   HPV16 NEG Negative -   HPV18 NEG Negative -   HRHPV NEG Negative -     Reviewed and updated as needed this visit by clinical staff  Tobacco  Allergies  Meds   Med Hx  Surg Hx  Fam Hx  Soc Hx       Reviewed and updated as needed this visit by Provider                   Review of Systems   Constitutional: Negative for chills and fever.   HENT: Negative for congestion, ear pain, hearing loss and sore throat.    Eyes: Negative for pain and visual disturbance.   Respiratory: Negative for cough and shortness of breath.    Cardiovascular: Negative for chest pain, palpitations and peripheral edema.   Gastrointestinal: Negative for abdominal pain, constipation, diarrhea, heartburn, hematochezia and nausea.   Breasts:  Negative for tenderness, breast mass and discharge.   Genitourinary: Negative for dysuria, frequency, genital sores, hematuria, pelvic pain, urgency, vaginal bleeding and vaginal discharge.   Musculoskeletal: Negative for arthralgias, joint swelling and myalgias.   Skin: Negative for rash.   Neurological: Negative for  "dizziness, weakness, headaches and paresthesias.   Psychiatric/Behavioral: Negative for mood changes. The patient is not nervous/anxious.          OBJECTIVE:   /80   Pulse 96   Temp 96.9  F (36.1  C) (Axillary)   Resp 16   Ht 1.753 m (5' 9\")   Wt 141.5 kg (311 lb 14.4 oz)   LMP 01/31/2022   SpO2 98%   BMI 46.06 kg/m    Physical Exam  GENERAL: healthy, alert and no distress  EYES: Eyes grossly normal to inspection, PERRL and conjunctivae and sclerae normal  HENT: ear canals and TM's normal,    NECK: no adenopathy, no asymmetry, masses, or scars and thyroid normal to palpation  RESP: lungs clear to auscultation - no rales, rhonchi or wheezes  BREAST: normal without masses, tenderness or nipple discharge and no palpable axillary masses or adenopathy  CV: regular rate and rhythm, normal S1 S2,   ABDOMEN: soft, nontender, and bowel sounds normal   (female): normal female external genitalia, normal urethral meatus, vaginal mucosa pink, moist, well rugated, and normal cervix/adnexa/ without masses or discharge, pap obtained   MS: no gross musculoskeletal defects noted, no edema  NEURO: Normal strength and tone, mentation intact and speech normal  PSYCH: mentation appears normal, affect normal/bright     ASSESSMENT/PLAN:       (Z00.00) Routine history and physical examination of adult  (primary encounter diagnosis)  Plan: Comprehensive metabolic panel, Hemoglobin,         Lipid panel reflex to direct LDL Non-fasting,         MA Screening Digital Bilateral,  Pap obtained               (J45.40) Moderate persistent asthma without complication  Plan: albuterol (PROAIR HFA/PROVENTIL HFA/VENTOLIN         HFA) 108 (90 Base) MCG/ACT inhaler,         budesonide-formoterol (SYMBICORT) 80-4.5         MCG/ACT Inhaler refilled as directed.explained clearly about the medication,insructions and side effects.             (E03.8) Other specified hypothyroidism  Plan: pt is taking levoxyl 150 mcg 1 tab daily, check TSH with " "free T4 reflex.pt was told I will contact her after results and proceed accordingly.  TSH high, advised to increase levoxyl to 150 mcg daily and take extra 1/2 tab once a week and rpt TSH in 6 wks.             (E66.01) Morbid obesity (H)  Plan:  -Discussed in detail about Diet,calorie intake,and importance of regular exercise .       Patient has been advised of split billing requirements and indicates understanding: Yes    COUNSELING:  Reviewed preventive health counseling, as reflected in patient instructions       Regular exercise       Healthy diet/nutrition       Immunizations  Vaccinated for: Moderna booster       Estimated body mass index is 46.06 kg/m  as calculated from the following:    Height as of this encounter: 1.753 m (5' 9\").    Weight as of this encounter: 141.5 kg (311 lb 14.4 oz).    Weight management plan: Discussed healthy diet and exercise guidelines    She reports that she has never smoked. She has never used smokeless tobacco.      Counseling Resources:  ATP IV Guidelines  Pooled Cohorts Equation Calculator  Breast Cancer Risk Calculator  BRCA-Related Cancer Risk Assessment: FHS-7 Tool  FRAX Risk Assessment  ICSI Preventive Guidelines  Dietary Guidelines for Americans, 2010  USDA's MyPlate  ASA Prophylaxis  Lung CA Screening    Dhruv Andino MD  [facility]  "

## 2022-02-14 NOTE — NURSING NOTE
"/80   Pulse 96   Temp 96.9  F (36.1  C) (Axillary)   Resp 16   Ht 1.753 m (5' 9\")   Wt 141.5 kg (311 lb 14.4 oz)   LMP 01/31/2022   SpO2 98%   BMI 46.06 kg/m    Patient in for Female Px and pap.  "

## 2022-02-15 LAB
ALBUMIN SERPL-MCNC: 3.3 G/DL (ref 3.4–5)
ALP SERPL-CCNC: 78 U/L (ref 40–150)
ALT SERPL W P-5'-P-CCNC: 19 U/L (ref 0–50)
ANION GAP SERPL CALCULATED.3IONS-SCNC: 8 MMOL/L (ref 3–14)
AST SERPL W P-5'-P-CCNC: 16 U/L (ref 0–45)
BILIRUB SERPL-MCNC: 0.4 MG/DL (ref 0.2–1.3)
BUN SERPL-MCNC: 11 MG/DL (ref 7–30)
CALCIUM SERPL-MCNC: 9.3 MG/DL (ref 8.5–10.1)
CHLORIDE BLD-SCNC: 105 MMOL/L (ref 94–109)
CHOLEST SERPL-MCNC: 142 MG/DL
CO2 SERPL-SCNC: 24 MMOL/L (ref 20–32)
CREAT SERPL-MCNC: 0.67 MG/DL (ref 0.52–1.04)
FASTING STATUS PATIENT QL REPORTED: NO
GFR SERPL CREATININE-BSD FRML MDRD: >90 ML/MIN/1.73M2
GLUCOSE BLD-MCNC: 75 MG/DL (ref 70–99)
HDLC SERPL-MCNC: 55 MG/DL
LDLC SERPL CALC-MCNC: 64 MG/DL
NONHDLC SERPL-MCNC: 87 MG/DL
POTASSIUM BLD-SCNC: 4.3 MMOL/L (ref 3.4–5.3)
PROT SERPL-MCNC: 7.7 G/DL (ref 6.8–8.8)
SODIUM SERPL-SCNC: 137 MMOL/L (ref 133–144)
T4 FREE SERPL-MCNC: 1.35 NG/DL (ref 0.76–1.46)
TRIGL SERPL-MCNC: 114 MG/DL
TSH SERPL DL<=0.005 MIU/L-ACNC: 6.53 MU/L (ref 0.4–4)

## 2022-02-17 DIAGNOSIS — E03.8 OTHER SPECIFIED HYPOTHYROIDISM: ICD-10-CM

## 2022-02-17 LAB
BKR LAB AP GYN ADEQUACY: NORMAL
BKR LAB AP GYN INTERPRETATION: NORMAL
BKR LAB AP HPV REFLEX: NORMAL
BKR LAB AP LMP: NORMAL
BKR LAB AP PREVIOUS ABNORMAL: NORMAL
PATH REPORT.COMMENTS IMP SPEC: NORMAL
PATH REPORT.COMMENTS IMP SPEC: NORMAL
PATH REPORT.RELEVANT HX SPEC: NORMAL

## 2022-02-17 RX ORDER — LEVOTHYROXINE SODIUM 150 UG/1
TABLET ORAL
Qty: 64 TABLET | Refills: 1 | Status: SHIPPED | OUTPATIENT
Start: 2022-02-17 | End: 2022-05-18

## 2022-02-22 LAB
HUMAN PAPILLOMA VIRUS 16 DNA: NEGATIVE
HUMAN PAPILLOMA VIRUS 18 DNA: NEGATIVE
HUMAN PAPILLOMA VIRUS FINAL DIAGNOSIS: NORMAL
HUMAN PAPILLOMA VIRUS OTHER HR: NEGATIVE

## 2022-05-18 ENCOUNTER — VIRTUAL VISIT (OUTPATIENT)
Dept: INTERNAL MEDICINE | Facility: CLINIC | Age: 42
End: 2022-05-18
Payer: COMMERCIAL

## 2022-05-18 DIAGNOSIS — E03.8 OTHER SPECIFIED HYPOTHYROIDISM: ICD-10-CM

## 2022-05-18 DIAGNOSIS — T78.40XS ALLERGIC REACTION, SEQUELA: Primary | ICD-10-CM

## 2022-05-18 DIAGNOSIS — K21.9 GASTROESOPHAGEAL REFLUX DISEASE WITHOUT ESOPHAGITIS: ICD-10-CM

## 2022-05-18 DIAGNOSIS — J45.40 MODERATE PERSISTENT ASTHMA WITHOUT COMPLICATION: ICD-10-CM

## 2022-05-18 PROCEDURE — 99213 OFFICE O/P EST LOW 20 MIN: CPT | Mod: 95 | Performed by: NURSE PRACTITIONER

## 2022-05-18 RX ORDER — OMEPRAZOLE 40 MG/1
40 CAPSULE, DELAYED RELEASE ORAL DAILY
Qty: 90 CAPSULE | Refills: 1 | Status: SHIPPED | OUTPATIENT
Start: 2022-05-18 | End: 2022-09-15

## 2022-05-18 RX ORDER — ALBUTEROL SULFATE 0.83 MG/ML
2.5 SOLUTION RESPIRATORY (INHALATION) EVERY 6 HOURS PRN
Qty: 90 ML | Refills: 1 | Status: SHIPPED | OUTPATIENT
Start: 2022-05-18 | End: 2023-08-30

## 2022-05-18 RX ORDER — ALBUTEROL SULFATE 90 UG/1
1-2 AEROSOL, METERED RESPIRATORY (INHALATION) EVERY 6 HOURS PRN
Qty: 54 G | Refills: 1 | Status: SHIPPED | OUTPATIENT
Start: 2022-05-18 | End: 2022-10-12

## 2022-05-18 RX ORDER — LEVOTHYROXINE SODIUM 150 UG/1
TABLET ORAL
Qty: 192 TABLET | Refills: 1 | Status: SHIPPED | OUTPATIENT
Start: 2022-05-18 | End: 2023-06-13

## 2022-05-18 RX ORDER — EPINEPHRINE 0.3 MG/.3ML
0.3 INJECTION SUBCUTANEOUS
Qty: 1 EACH | Refills: 1 | Status: SHIPPED | OUTPATIENT
Start: 2022-05-18

## 2022-05-18 RX ORDER — BUDESONIDE AND FORMOTEROL FUMARATE DIHYDRATE 80; 4.5 UG/1; UG/1
2 AEROSOL RESPIRATORY (INHALATION) 2 TIMES DAILY
Qty: 30.6 G | Refills: 6 | Status: SHIPPED | OUTPATIENT
Start: 2022-05-18 | End: 2023-07-13

## 2022-05-18 ASSESSMENT — ASTHMA QUESTIONNAIRES: ACT_TOTALSCORE: 20

## 2022-05-18 NOTE — PROGRESS NOTES
Gabi is a 42 year old who is being evaluated via a billable telephone visit.      What phone number would you like to be contacted at? Call # [phone]  How would you like to obtain your AVS? Mail a copy    Assessment & Plan     Moderate persistent asthma without complication  fairly well controlled   - albuterol (PROAIR HFA/PROVENTIL HFA/VENTOLIN HFA) 108 (90 Base) MCG/ACT inhaler; Inhale 1-2 puffs into the lungs every 6 hours as needed for shortness of breath / dyspnea or wheezing traveling for 3 months INHALE TWO PUFFS INTO THE LUNGS EVERY SIX HOURS AS NEEDED FOR SHORTNESS OF BREATH/DYSPNEA  - albuterol (PROVENTIL) (2.5 MG/3ML) 0.083% neb solution; Take 1 vial (2.5 mg) by nebulization every 6 hours as needed for shortness of breath / dyspnea or wheezing  - budesonide-formoterol (SYMBICORT) 80-4.5 MCG/ACT Inhaler; Inhale 2 puffs into the lungs 2 times daily Traveling for 3 months    Other specified hypothyroidism  Needs recheck   - levothyroxine (SYNTHROID/LEVOTHROID) 150 MCG tablet; TAKE 1 TABLET DAILY AND TAKE EXTRA 1/2 TABLET ONCE A WEEK Needs 3 month for travel  - TSH with free T4 reflex; Future    Gastroesophageal reflux disease without esophagitis  tolerates medication   - omeprazole (PRILOSEC) 40 MG DR capsule; Take 1 capsule (40 mg) by mouth daily    Allergic reaction, sequela    - EPINEPHrine (ANY BX GENERIC EQUIV) 0.3 MG/0.3ML injection 2-pack; Inject 0.3 mLs (0.3 mg) into the muscle once as needed for anaphylaxis      15 minutes spent on the date of the encounter doing chart review, history and exam, documentation and further activities per the note       Patient Instructions   Medication refilled     Thyroid recheck as directed       Return in about 6 months (around 11/18/2022).    CARLOS Brown [facility]    Ani Ashley is a 42 year old who presents for the following health issues     HPI     Called  line to do visit       Patient needs  refill on medication   Planning on travel  And requests 3 month supply be given     Plans on checking thyroid as directed by Dr Andino   Patient states she is taking the thyroid as ordered - asked through  with directions         Review of Systems   Constitutional, HEENT, cardiovascular, pulmonary, GI, , musculoskeletal, neuro, skin, endocrine and psych systems are negative, except as otherwise noted.      Objective           Vitals:  No vitals were obtained today due to virtual visit.    Physical Exam   alert and no distress  PSYCH: Alert and oriented times 3; coherent speech, normal   rate and volume, able to articulate logical thoughts, able   to abstract reason, no tangential thoughts, no hallucinations   or delusions  Her affect is normal  RESP: No cough, no audible wheezing, able to talk in full sentences  Remainder of exam unable to be completed due to telephone visits                Phone call duration: 15 minutes

## 2022-10-12 DIAGNOSIS — J45.40 MODERATE PERSISTENT ASTHMA WITHOUT COMPLICATION: ICD-10-CM

## 2022-10-12 RX ORDER — ALBUTEROL SULFATE 90 UG/1
AEROSOL, METERED RESPIRATORY (INHALATION)
Qty: 54 G | Refills: 0 | Status: SHIPPED | OUTPATIENT
Start: 2022-10-12 | End: 2023-03-24

## 2022-10-13 NOTE — TELEPHONE ENCOUNTER
Pending Prescriptions:                       Disp   Refills    VENTOLIN  (90 Base) MCG/ACT inhale*54 g   0            Sig: INHALE 1-2 PUFFS INTO THE LUNGS EVERY 6 HOURS AS           NEEDED FOR SHORTNESS OF BREATH / DYSPNEA OR           WHEEZING    Prescription approved per [facility] Refill Protocol.

## 2022-10-17 ENCOUNTER — TELEPHONE (OUTPATIENT)
Dept: INTERNAL MEDICINE | Facility: CLINIC | Age: 42
End: 2022-10-17

## 2022-10-17 NOTE — TELEPHONE ENCOUNTER
Patient Quality Outreach    Patient is due for the following:   Asthma  -  Asthma follow-up visit    Next Steps:   Patient NOT due    Type of outreach:    Pt not due      Questions for provider review:    None     Deirdre Del Valle MA  Chart routed to none.

## 2023-02-16 DIAGNOSIS — K21.9 GASTROESOPHAGEAL REFLUX DISEASE WITHOUT ESOPHAGITIS: ICD-10-CM

## 2023-02-17 RX ORDER — OMEPRAZOLE 40 MG/1
40 CAPSULE, DELAYED RELEASE ORAL DAILY
Qty: 90 CAPSULE | Refills: 1 | Status: SHIPPED | OUTPATIENT
Start: 2023-02-17 | End: 2023-08-30

## 2023-03-22 DIAGNOSIS — J45.40 MODERATE PERSISTENT ASTHMA WITHOUT COMPLICATION: ICD-10-CM

## 2023-03-24 RX ORDER — ALBUTEROL SULFATE 90 UG/1
AEROSOL, METERED RESPIRATORY (INHALATION)
Qty: 54 G | Refills: 0 | Status: SHIPPED | OUTPATIENT
Start: 2023-03-24 | End: 2023-07-13

## 2023-03-24 NOTE — TELEPHONE ENCOUNTER
Routing refill request to provider for review/approval because:  ACT Total Scores 12/6/2021 2/14/2022 5/18/2022   ACT TOTAL SCORE - - -   ASTHMA ER VISITS - - -   ASTHMA HOSPITALIZATIONS - - -   ACT TOTAL SCORE (Goal Greater than or Equal to 20) 9 20 20   In the past 12 months, how many times did you visit the emergency room for your asthma without being admitted to the hospital? 0 0 0   In the past 12 months, how many times were you hospitalized overnight because of your asthma? 0 0 0

## 2023-06-06 ENCOUNTER — APPOINTMENT (OUTPATIENT)
Dept: INTERPRETER SERVICES | Facility: CLINIC | Age: 43
End: 2023-06-06
Payer: COMMERCIAL

## 2023-06-12 ENCOUNTER — LAB (OUTPATIENT)
Dept: LAB | Facility: CLINIC | Age: 43
End: 2023-06-12
Payer: COMMERCIAL

## 2023-06-12 DIAGNOSIS — E03.8 OTHER SPECIFIED HYPOTHYROIDISM: ICD-10-CM

## 2023-06-12 LAB
T4 FREE SERPL-MCNC: 1.52 NG/DL (ref 0.9–1.7)
TSH SERPL DL<=0.005 MIU/L-ACNC: 7.25 UIU/ML (ref 0.3–4.2)

## 2023-06-12 PROCEDURE — 36415 COLL VENOUS BLD VENIPUNCTURE: CPT

## 2023-06-12 PROCEDURE — 84439 ASSAY OF FREE THYROXINE: CPT

## 2023-06-12 PROCEDURE — 84443 ASSAY THYROID STIM HORMONE: CPT

## 2023-07-10 DIAGNOSIS — J45.40 MODERATE PERSISTENT ASTHMA WITHOUT COMPLICATION: ICD-10-CM

## 2023-07-10 DIAGNOSIS — E03.8 OTHER SPECIFIED HYPOTHYROIDISM: ICD-10-CM

## 2023-07-13 RX ORDER — LEVOTHYROXINE SODIUM 150 UG/1
TABLET ORAL
Qty: 192 TABLET | Refills: 0 | Status: SHIPPED | OUTPATIENT
Start: 2023-07-13 | End: 2024-02-07

## 2023-07-13 RX ORDER — DILTIAZEM HYDROCHLORIDE 60 MG/1
TABLET, FILM COATED ORAL
Qty: 30.6 G | Refills: 0 | Status: SHIPPED | OUTPATIENT
Start: 2023-07-13 | End: 2023-11-29

## 2023-07-13 RX ORDER — ALBUTEROL SULFATE 90 UG/1
AEROSOL, METERED RESPIRATORY (INHALATION)
Qty: 54 G | Refills: 0 | Status: SHIPPED | OUTPATIENT
Start: 2023-07-13 | End: 2023-08-23

## 2023-07-21 ENCOUNTER — APPOINTMENT (OUTPATIENT)
Dept: INTERPRETER SERVICES | Facility: CLINIC | Age: 43
End: 2023-07-21
Payer: COMMERCIAL

## 2023-07-21 NOTE — TELEPHONE ENCOUNTER
Left message for patient to call back via [facility] .

## 2023-08-22 DIAGNOSIS — J45.40 MODERATE PERSISTENT ASTHMA WITHOUT COMPLICATION: ICD-10-CM

## 2023-08-23 RX ORDER — ALBUTEROL SULFATE 90 UG/1
AEROSOL, METERED RESPIRATORY (INHALATION)
Qty: 54 G | Refills: 0 | Status: SHIPPED | OUTPATIENT
Start: 2023-08-23 | End: 2023-11-29

## 2023-08-30 ENCOUNTER — OFFICE VISIT (OUTPATIENT)
Dept: INTERNAL MEDICINE | Facility: CLINIC | Age: 43
End: 2023-08-30
Payer: COMMERCIAL

## 2023-08-30 ENCOUNTER — ANCILLARY PROCEDURE (OUTPATIENT)
Dept: GENERAL RADIOLOGY | Facility: CLINIC | Age: 43
End: 2023-08-30
Attending: INTERNAL MEDICINE
Payer: COMMERCIAL

## 2023-08-30 VITALS
HEIGHT: 69 IN | WEIGHT: 293 LBS | SYSTOLIC BLOOD PRESSURE: 124 MMHG | DIASTOLIC BLOOD PRESSURE: 78 MMHG | BODY MASS INDEX: 43.4 KG/M2 | RESPIRATION RATE: 16 BRPM | TEMPERATURE: 98.2 F | OXYGEN SATURATION: 98 % | HEART RATE: 110 BPM

## 2023-08-30 DIAGNOSIS — E03.9 HYPOTHYROIDISM, UNSPECIFIED TYPE: ICD-10-CM

## 2023-08-30 DIAGNOSIS — M25.511 CHRONIC RIGHT SHOULDER PAIN: ICD-10-CM

## 2023-08-30 DIAGNOSIS — G89.29 CHRONIC RIGHT SHOULDER PAIN: Primary | ICD-10-CM

## 2023-08-30 DIAGNOSIS — E66.01 MORBID OBESITY (H): ICD-10-CM

## 2023-08-30 DIAGNOSIS — G89.29 CHRONIC RIGHT SHOULDER PAIN: ICD-10-CM

## 2023-08-30 DIAGNOSIS — M79.671 FOOT PAIN, BILATERAL: ICD-10-CM

## 2023-08-30 DIAGNOSIS — K21.9 GASTROESOPHAGEAL REFLUX DISEASE WITHOUT ESOPHAGITIS: ICD-10-CM

## 2023-08-30 DIAGNOSIS — M25.511 CHRONIC RIGHT SHOULDER PAIN: Primary | ICD-10-CM

## 2023-08-30 DIAGNOSIS — J45.40 MODERATE PERSISTENT ASTHMA WITHOUT COMPLICATION: ICD-10-CM

## 2023-08-30 DIAGNOSIS — M79.672 FOOT PAIN, BILATERAL: ICD-10-CM

## 2023-08-30 LAB
T4 FREE SERPL-MCNC: 1.36 NG/DL (ref 0.9–1.7)
TSH SERPL DL<=0.005 MIU/L-ACNC: 6.01 UIU/ML (ref 0.3–4.2)

## 2023-08-30 PROCEDURE — 36415 COLL VENOUS BLD VENIPUNCTURE: CPT | Performed by: INTERNAL MEDICINE

## 2023-08-30 PROCEDURE — 73030 X-RAY EXAM OF SHOULDER: CPT | Mod: TC | Performed by: RADIOLOGY

## 2023-08-30 PROCEDURE — 84443 ASSAY THYROID STIM HORMONE: CPT | Performed by: INTERNAL MEDICINE

## 2023-08-30 PROCEDURE — 99214 OFFICE O/P EST MOD 30 MIN: CPT | Performed by: INTERNAL MEDICINE

## 2023-08-30 PROCEDURE — 84439 ASSAY OF FREE THYROXINE: CPT | Performed by: INTERNAL MEDICINE

## 2023-08-30 RX ORDER — ALBUTEROL SULFATE 0.83 MG/ML
2.5 SOLUTION RESPIRATORY (INHALATION) EVERY 6 HOURS PRN
Qty: 90 ML | Refills: 1 | Status: SHIPPED | OUTPATIENT
Start: 2023-08-30 | End: 2024-07-24

## 2023-08-30 RX ORDER — OMEPRAZOLE 40 MG/1
40 CAPSULE, DELAYED RELEASE ORAL DAILY
Qty: 90 CAPSULE | Refills: 1 | Status: SHIPPED | OUTPATIENT
Start: 2023-08-30 | End: 2024-07-24

## 2023-08-30 ASSESSMENT — ASTHMA QUESTIONNAIRES: ACT_TOTALSCORE: 24

## 2023-08-30 NOTE — PROGRESS NOTES
"  Assessment & Plan     Chronic right shoulder pain  Chronic right shoulder pain, difficulty in abduction greater than 60 degree angle.  Most likely rotator cuff tendinopathy.  Discussed with the patient on updated shoulder x-ray as well as initiation of physical therapy.  May consider orthopedic/sports medicine referral pending x-ray results.  - XR Shoulder Right 2 Views; Future  - Physical Therapy Referral; Future    Hypothyroidism, unspecified type  Has been recommended to take an additional half tablet per week which patient has not been taking.  Discussed with the patient on updating TSH with free T4.  Medication adjustments as appropriate pending lab results.  - TSH with free T4 reflex; Future  - TSH with free T4 reflex    Gastroesophageal reflux disease without esophagitis  Stable, continue with prilosec.  - omeprazole (PRILOSEC) 40 MG DR capsule; Take 1 capsule (40 mg) by mouth daily    Moderate persistent asthma without complication  Stable.  - albuterol (PROVENTIL) (2.5 MG/3ML) 0.083% neb solution; Take 1 vial (2.5 mg) by nebulization every 6 hours as needed for shortness of breath or wheezing    Foot pain, bilateral  From symptomatology and examination, most likely Planter fasciitis.  Patient was provided with symptomatic management information on the AVS in native language.  Additionally, discussed with the patient on physical therapy for gentle stretching exercises to help with the discomfort.  - Physical Therapy Referral; Future      Morbid obesity.  Dietary recommendations and exercise have been tried by the patient.  Interested in medical weight management options and requesting for referral to weight management team.       BMI:   Estimated body mass index is 46.62 kg/m  as calculated from the following:    Height as of this encounter: 1.753 m (5' 9\").    Weight as of this encounter: 143.2 kg (315 lb 11.2 oz).           Bijal Garcia MD  [facility]   Gabi is a " "43 year old, presenting for the following health issues:  Recheck Medication        8/30/2023    10:46 AM   Additional Questions   Roomed by Liseth  Sister  Phone Indonesian Interpretor        History of Present Illness     Asthma:  She presents for follow up of asthma.  She has no cough, no wheezing, and no shortness of breath.  She is using a relief medication 2-3 times per day. She does not miss any doses of her controller medication throughout the week. Patient is aware of the following triggers: none. The patient has not had a visit to the Emergency Room, Urgent Care or Hospital due to asthma since the last clinic visit.     Back Pain:  She presents for follow up of back pain. Patient's back pain is a chronic problem.  Location of back pain:  Left middle of back  Description of back pain: other  Back pain spreads: nowhere    Since patient first noticed back pain, pain is: unchanged  Does back pain interfere with her job:  Not applicable       She eats 2-3 servings of fruits and vegetables daily.She consumes 4 sweetened beverage(s) daily.She exercises with enough effort to increase her heart rate 10 to 19 minutes per day.  She exercises with enough effort to increase her heart rate 4 days per week.   She is taking medications regularly.       Hypothyroidism Follow-up    Since last visit, patient describes the following symptoms: constipation, and hair loss        Review of Systems   Constitutional, HEENT, cardiovascular, pulmonary, gi and gu systems are negative, except as otherwise noted.      Objective    /78   Pulse 110   Temp 98.2  F (36.8  C) (Tympanic)   Resp 16   Ht 1.753 m (5' 9\")   Wt 143.2 kg (315 lb 11.2 oz)   LMP 07/30/2023 (Exact Date)   SpO2 98%   BMI 46.62 kg/m    Body mass index is 46.62 kg/m .  Physical Exam   GENERAL: healthy, alert and no distress  RESP: lungs clear to auscultation - no rales, rhonchi or wheezes  CV: regular rate and rhythm, normal S1 S2  MS: no gross musculoskeletal " defects noted, no edema  NEURO: Normal strength and tone, mentation intact and speech normal  PSYCH: mentation appears normal, affect normal/bright

## 2023-08-30 NOTE — LETTER
September 12, 2023      Gabirachell Lewis [place]  [id number] [facility] [address]        Dear Ms.Mousa Roblero,    We are writing to inform you of your test results.     lab work indicates that patient may need  additional thyroid medication.  Recommendation to resume the additional half tablet per week the patient was taking before for the levothyroxine.  This is especially since she is also experiencing symptoms of under replacement including weight changes.  Recommendation to repeat TSH lab work in 6 to 8 weeks.  Lab has been ordered and patient recommended to get the lab work completed at the appropriate time.    Resulted Orders   TSH with free T4 reflex   Result Value Ref Range    TSH 6.01 (H) 0.30 - 4.20 uIU/mL   T4 free   Result Value Ref Range    Free T4 1.36 0.90 - 1.70 ng/dL       If you have any questions or concerns, please call the clinic at the number listed above.       Sincerely,      Bijal Garcia MD

## 2023-08-31 DIAGNOSIS — E03.9 HYPOTHYROIDISM, UNSPECIFIED TYPE: Primary | ICD-10-CM

## 2023-09-01 ENCOUNTER — APPOINTMENT (OUTPATIENT)
Dept: INTERPRETER SERVICES | Facility: CLINIC | Age: 43
End: 2023-09-01
Payer: COMMERCIAL

## 2023-09-10 ENCOUNTER — OFFICE VISIT (OUTPATIENT)
Dept: URGENT CARE | Facility: URGENT CARE | Age: 43
End: 2023-09-10
Payer: COMMERCIAL

## 2023-09-10 VITALS
TEMPERATURE: 97.6 F | HEART RATE: 77 BPM | OXYGEN SATURATION: 96 % | RESPIRATION RATE: 14 BRPM | SYSTOLIC BLOOD PRESSURE: 134 MMHG | DIASTOLIC BLOOD PRESSURE: 74 MMHG

## 2023-09-10 DIAGNOSIS — H66.92 ACUTE LEFT OTITIS MEDIA: Primary | ICD-10-CM

## 2023-09-10 PROCEDURE — 99213 OFFICE O/P EST LOW 20 MIN: CPT | Performed by: PHYSICIAN ASSISTANT

## 2023-09-10 RX ORDER — AMOXICILLIN 875 MG
875 TABLET ORAL 2 TIMES DAILY
Qty: 14 TABLET | Refills: 0 | Status: SHIPPED | OUTPATIENT
Start: 2023-09-10 | End: 2023-09-17

## 2023-09-10 NOTE — PROGRESS NOTES
Assessment/Plan:    Will prescribe antibiotic to treat acute otitis media. No sign of mastoiditis or TM perforation.    See patient instructions below.    At the end of the encounter, I discussed results, diagnosis, medications. Discussed red flags for immediate return to clinic/ER, as well as indications for follow up if no improvement. Patient understood and agreed to plan. Patient was stable for discharge.      ICD-10-CM    1. Acute left otitis media  H66.92 amoxicillin (AMOXIL) 875 MG tablet            Return in about 1 week (around 9/17/2023) for Follow up w/ primary care provider if not better.    CRESCENCIO Longo, DEREK  [facility]  -----------------------------------------------------------------------------------------------------------------------------------------------------    HPI:  Gabi Roblero is a 43 year old female who presents for evaluation of L ear pain onset 1 week ago. She has been experiencing congestion due to allergies. No treatments tried. Patient reports no ear drainage, fever/chills, headache, hearing loss, tinnitus, rash, or any other symptoms.     Past Medical History:   Diagnosis Date    GERD (gastroesophageal reflux disease)     Hypothyroidism     Moderate persistent asthma 4/28/2016       Vitals:    09/10/23 1458   BP: 134/74   Pulse: 77   Resp: 14   Temp: 97.6  F (36.4  C)   SpO2: 96%       Physical Exam  Vitals and nursing note reviewed.   HENT:      Right Ear: Tympanic membrane and external ear normal.      Left Ear: External ear normal. A middle ear effusion is present. Tympanic membrane is erythematous and bulging.      Mouth/Throat:      Mouth: Mucous membranes are moist.   Pulmonary:      Effort: Pulmonary effort is normal.   Neurological:      Mental Status: She is alert.         Labs/Imaging:  No results found for this or any previous visit (from the past 24 hour(s)).  No results found for this or any previous visit (from the past 24  hour(s)).        Patient Instructions   Please complete antibiotic as prescribed. Give with food.   May also use Tylenol/Motrin for pain as needed.    If your child develops fevers that do not go away with Tylenol or Motrin, becomes extremely irritable, stops eating/drinking/or urinating, please bring them back for re-evaluation. Otherwise, please follow up in 3-4 weeks for ear recheck with primary care doctor.    Acetaminophen Dosing Instructions (may take every 4-6 hours):                   Weight Infant/Children's Suspension 160mg/5mL Children's Soft Chews Chewable Tablets 80 mg each Stephon Strength Chewable Tablets 160 mg each   6-11 lbs 1.25 mL     12-17 lbs 2.5 mL     18-23 lbs 3.75 mL     24-35 lbs 5 mL 2    36-47 lbs 7.5 mL 3    48-59 lbs 10 mL 4 2   60-71 lbs 12.5 mL 5 2 1/2   72-95 lbs 15 mL 6 3   96 lbs & over   4         Ibuprofen Dosing Instructions (may take every 6-8 hours):      Weight Infant Drops 5mg/1.25 mL Children's Suspension 100mg/5mL Children's Chewablet Tablets 50 mg each Stephon Strength Tablets 100 mg each   12-17 lbs 1.25mL (1 dropperful)      18-23 lbs 1.875 mL (1.5 dropperful)      24-35 lbs  5 mL 2    36-47 lbs  7.5 mL 3    48-59 lbs  10 mL 4    60-71 lbs  12.5 mL 5 2 1/2    72-95 lbs  15 mL 6 3          Otitis Media  Your child has a middle ear infection (acute otitis media). It is caused by bacteria or fungi. The middle ear is the space behind the eardrum. The eustachian tube connects the ear to the nasal passage. The eustachian tubes help drain fluid from the ears. They also keep the air pressure equal inside and outside the ears. These tubes are shorter and more horizontal in children. This makes it more likely for the tubes to become blocked. A blockage lets fluid and pressure build up in the middle ear. Bacteria or fungi can grow in this fluid and cause an ear infection. This infection is commonly known as an earache.  The main symptom of an ear infection is ear pain. Other  symptoms may include pulling at the ear, being more fussy than usual, decreased appetite, and vomiting or diarrhea. Your child s hearing may also be affected. Your child may have had a respiratory infection first.  An ear infection may clear up on its own. Or your child may need to take medicine. After the infection goes away, your child may still have fluid in the middle ear. It may take weeks or months for this fluid to go away. During that time, your child may have temporary hearing loss. But all other symptoms of the earache should be gone.  Home care  Follow these guidelines when caring for your child at home:  The healthcare provider will likely prescribe medicines for pain. The provider may also prescribe antibiotics or antifungals to treat the infection. These may be liquid medicines to give by mouth. Or they may be ear drops. Follow the provider s instructions for giving these medicines to your child.  Because ear infections can clear up on their own, the provider may suggest waiting for a few days before giving your child medicines for infection.  To reduce pain, have your child rest in an upright position. Hot or cold compresses held against the ear may help ease pain.  Keep the ear dry. Have your child wear a shower cap when bathing.  To help prevent future infections:  Don't smoke near your child. Secondhand smoke raises the risk for ear infections in children.  Make sure your child gets all appropriate vaccines.  Do not bottle-feed while your baby is lying on his or her back. (This position can cause middle ear infections because it allows milk to run into the eustachian tubes.)      If you breastfeed, continue until your child is 6 to 12 months of age.  To apply ear drops:  Put the bottle in warm water if the medicine is kept in the refrigerator. Cold drops in the ear are uncomfortable.  Have your child lie down on a flat surface. Gently hold your child s head to 1 side.  Remove any drainage from the  ear with a clean tissue or cotton swab. Clean only the outer ear. Don t put the cotton swab into the ear canal.  Straighten the ear canal by gently pulling the earlobe up and back.  Keep the dropper a half-inch above the ear canal. This will keep the dropper from becoming contaminated. Put the drops against the side of the ear canal.  Have your child stay lying down for 2 to 3 minutes. This gives time for the medicine to enter the ear canal. If your child doesn t have pain, gently massage the outer ear near the opening.  Wipe any extra medicine away from the outer ear with a clean cotton ball.  Follow-up care  Follow up with your child s healthcare provider as directed. Your child will need to have the ear rechecked to make sure the infection has gone away. Check with the healthcare provider to see when they want to see your child.  Special note to parents  If your child continues to get earaches, he or she may need ear tubes. The provider will put small tubes in your child s eardrum to help keep fluid from building up. This procedure is a simple and works well.  When to seek medical advice  Unless advised otherwise, call your child's healthcare provider if:  Your child is 3 months old or younger and has a fever of 100.4 F (38 C) or higher. Your child may need to see a healthcare provider.  Your child is of any age and has fevers higher than 104 F (40 C) that come back again and again.  Call your child's healthcare provider for any of the following:  New symptoms, especially swelling around the ear or weakness of face muscles  Severe pain  Infection seems to get worse, not better   Neck pain  Your child acts very sick or not himself or herself  Fever or pain do not improve with antibiotics after 48 hours

## 2023-09-10 NOTE — PATIENT INSTRUCTIONS
Please complete antibiotic as prescribed. Give with food.   May also use Tylenol/Motrin for pain as needed.    If your child develops fevers that do not go away with Tylenol or Motrin, becomes extremely irritable, stops eating/drinking/or urinating, please bring them back for re-evaluation. Otherwise, please follow up in 3-4 weeks for ear recheck with primary care doctor.    Acetaminophen Dosing Instructions (may take every 4-6 hours):                   Weight Infant/Children's Suspension 160mg/5mL Children's Soft Chews Chewable Tablets 80 mg each Stephon Strength Chewable Tablets 160 mg each   6-11 lbs 1.25 mL     12-17 lbs 2.5 mL     18-23 lbs 3.75 mL     24-35 lbs 5 mL 2    36-47 lbs 7.5 mL 3    48-59 lbs 10 mL 4 2   60-71 lbs 12.5 mL 5 2 1/2   72-95 lbs 15 mL 6 3   96 lbs & over   4         Ibuprofen Dosing Instructions (may take every 6-8 hours):      Weight Infant Drops 5mg/1.25 mL Children's Suspension 100mg/5mL Children's Chewablet Tablets 50 mg each Stephon Strength Tablets 100 mg each   12-17 lbs 1.25mL (1 dropperful)      18-23 lbs 1.875 mL (1.5 dropperful)      24-35 lbs  5 mL 2    36-47 lbs  7.5 mL 3    48-59 lbs  10 mL 4    60-71 lbs  12.5 mL 5 2 1/2    72-95 lbs  15 mL 6 3          Otitis Media  Your child has a middle ear infection (acute otitis media). It is caused by bacteria or fungi. The middle ear is the space behind the eardrum. The eustachian tube connects the ear to the nasal passage. The eustachian tubes help drain fluid from the ears. They also keep the air pressure equal inside and outside the ears. These tubes are shorter and more horizontal in children. This makes it more likely for the tubes to become blocked. A blockage lets fluid and pressure build up in the middle ear. Bacteria or fungi can grow in this fluid and cause an ear infection. This infection is commonly known as an earache.  The main symptom of an ear infection is ear pain. Other symptoms may include pulling at the ear, being  more fussy than usual, decreased appetite, and vomiting or diarrhea. Your child s hearing may also be affected. Your child may have had a respiratory infection first.  An ear infection may clear up on its own. Or your child may need to take medicine. After the infection goes away, your child may still have fluid in the middle ear. It may take weeks or months for this fluid to go away. During that time, your child may have temporary hearing loss. But all other symptoms of the earache should be gone.  Home care  Follow these guidelines when caring for your child at home:  The healthcare provider will likely prescribe medicines for pain. The provider may also prescribe antibiotics or antifungals to treat the infection. These may be liquid medicines to give by mouth. Or they may be ear drops. Follow the provider s instructions for giving these medicines to your child.  Because ear infections can clear up on their own, the provider may suggest waiting for a few days before giving your child medicines for infection.  To reduce pain, have your child rest in an upright position. Hot or cold compresses held against the ear may help ease pain.  Keep the ear dry. Have your child wear a shower cap when bathing.  To help prevent future infections:  Don't smoke near your child. Secondhand smoke raises the risk for ear infections in children.  Make sure your child gets all appropriate vaccines.  Do not bottle-feed while your baby is lying on his or her back. (This position can cause middle ear infections because it allows milk to run into the eustachian tubes.)      If you breastfeed, continue until your child is 6 to 12 months of age.  To apply ear drops:  Put the bottle in warm water if the medicine is kept in the refrigerator. Cold drops in the ear are uncomfortable.  Have your child lie down on a flat surface. Gently hold your child s head to 1 side.  Remove any drainage from the ear with a clean tissue or cotton swab. Clean  only the outer ear. Don t put the cotton swab into the ear canal.  Straighten the ear canal by gently pulling the earlobe up and back.  Keep the dropper a half-inch above the ear canal. This will keep the dropper from becoming contaminated. Put the drops against the side of the ear canal.  Have your child stay lying down for 2 to 3 minutes. This gives time for the medicine to enter the ear canal. If your child doesn t have pain, gently massage the outer ear near the opening.  Wipe any extra medicine away from the outer ear with a clean cotton ball.  Follow-up care  Follow up with your child s healthcare provider as directed. Your child will need to have the ear rechecked to make sure the infection has gone away. Check with the healthcare provider to see when they want to see your child.  Special note to parents  If your child continues to get earaches, he or she may need ear tubes. The provider will put small tubes in your child s eardrum to help keep fluid from building up. This procedure is a simple and works well.  When to seek medical advice  Unless advised otherwise, call your child's healthcare provider if:  Your child is 3 months old or younger and has a fever of 100.4 F (38 C) or higher. Your child may need to see a healthcare provider.  Your child is of any age and has fevers higher than 104 F (40 C) that come back again and again.  Call your child's healthcare provider for any of the following:  New symptoms, especially swelling around the ear or weakness of face muscles  Severe pain  Infection seems to get worse, not better   Neck pain  Your child acts very sick or not himself or herself  Fever or pain do not improve with antibiotics after 48 hours

## 2023-11-29 DIAGNOSIS — J45.40 MODERATE PERSISTENT ASTHMA WITHOUT COMPLICATION: ICD-10-CM

## 2023-11-29 RX ORDER — DILTIAZEM HYDROCHLORIDE 60 MG/1
TABLET, FILM COATED ORAL
Qty: 30.6 G | Refills: 0 | Status: SHIPPED | OUTPATIENT
Start: 2023-11-29 | End: 2024-03-15

## 2023-11-29 RX ORDER — ALBUTEROL SULFATE 90 UG/1
AEROSOL, METERED RESPIRATORY (INHALATION)
Qty: 54 G | Refills: 0 | Status: SHIPPED | OUTPATIENT
Start: 2023-11-29 | End: 2024-02-13

## 2023-11-29 NOTE — TELEPHONE ENCOUNTER
Pt is at the pharmacy currently.   Prescription approved per [facility] Refill Protocol.  Pt has appt in 2 weeks with Dr Garcia

## 2024-02-07 DIAGNOSIS — E03.8 OTHER SPECIFIED HYPOTHYROIDISM: ICD-10-CM

## 2024-02-07 RX ORDER — LEVOTHYROXINE SODIUM 150 UG/1
TABLET ORAL
Qty: 68 TABLET | Refills: 0 | Status: SHIPPED | OUTPATIENT
Start: 2024-02-07 | End: 2024-05-22

## 2024-02-07 NOTE — TELEPHONE ENCOUNTER
Called and left message with patient using [employer]  # [phone] to call clinic back.    Thank you,  Malachi Aranda, Triage RN Joyce Malta  4:14 PM 2/7/2024

## 2024-02-07 NOTE — LETTER
February 12, 2024      Gabi Roblero  [address] [address] [facility] [address]        Dear Gabi,     We have received a refill request from your pharmacy for your medication - Levothyroxine. Many medications require routine follow-up with your provider and a review of your chart indicates that you are due for lab follow up. We have sent a one time refill to your pharmacy to allow you time to schedule an appointment.     Please call [phone] to schedule an appointment.  We look forward to seeing you in the near future.      Thank you,     [facility]

## 2024-02-09 NOTE — TELEPHONE ENCOUNTER
Called and left message with patient using [employer]  # [phone] to call clinic back.    Thank you,  Malachi Aranda, Triage RN Joyce Turton  11:11 AM 2/9/2024

## 2024-02-12 DIAGNOSIS — J45.40 MODERATE PERSISTENT ASTHMA WITHOUT COMPLICATION: ICD-10-CM

## 2024-02-13 RX ORDER — ALBUTEROL SULFATE 90 UG/1
AEROSOL, METERED RESPIRATORY (INHALATION)
Qty: 54 G | Refills: 0 | Status: SHIPPED | OUTPATIENT
Start: 2024-02-13 | End: 2024-03-15

## 2024-03-15 ENCOUNTER — OFFICE VISIT (OUTPATIENT)
Dept: URGENT CARE | Facility: URGENT CARE | Age: 44
End: 2024-03-15
Payer: COMMERCIAL

## 2024-03-15 VITALS
SYSTOLIC BLOOD PRESSURE: 113 MMHG | OXYGEN SATURATION: 98 % | DIASTOLIC BLOOD PRESSURE: 76 MMHG | TEMPERATURE: 97.7 F | BODY MASS INDEX: 44.6 KG/M2 | HEART RATE: 78 BPM | WEIGHT: 293 LBS

## 2024-03-15 DIAGNOSIS — J02.9 SORE THROAT: Primary | ICD-10-CM

## 2024-03-15 DIAGNOSIS — J45.40 MODERATE PERSISTENT ASTHMA WITHOUT COMPLICATION: ICD-10-CM

## 2024-03-15 LAB
DEPRECATED S PYO AG THROAT QL EIA: NEGATIVE
FLUAV AG SPEC QL IA: NEGATIVE
FLUBV AG SPEC QL IA: NEGATIVE

## 2024-03-15 PROCEDURE — 87651 STREP A DNA AMP PROBE: CPT | Performed by: FAMILY MEDICINE

## 2024-03-15 PROCEDURE — 87804 INFLUENZA ASSAY W/OPTIC: CPT | Performed by: FAMILY MEDICINE

## 2024-03-15 PROCEDURE — 99214 OFFICE O/P EST MOD 30 MIN: CPT | Performed by: FAMILY MEDICINE

## 2024-03-15 RX ORDER — BUDESONIDE AND FORMOTEROL FUMARATE DIHYDRATE 80; 4.5 UG/1; UG/1
2 AEROSOL RESPIRATORY (INHALATION) 2 TIMES DAILY
Qty: 10.2 G | Refills: 0 | Status: SHIPPED | OUTPATIENT
Start: 2024-03-15 | End: 2024-04-11

## 2024-03-15 RX ORDER — PREDNISONE 20 MG/1
40 TABLET ORAL DAILY
Qty: 10 TABLET | Refills: 0 | Status: SHIPPED | OUTPATIENT
Start: 2024-03-15 | End: 2024-03-20

## 2024-03-15 RX ORDER — ALBUTEROL SULFATE 90 UG/1
1-2 AEROSOL, METERED RESPIRATORY (INHALATION) EVERY 6 HOURS PRN
Qty: 18 G | Refills: 0 | Status: SHIPPED | OUTPATIENT
Start: 2024-03-15 | End: 2024-07-09

## 2024-03-15 NOTE — PROGRESS NOTES
SUBJECTIVE:   Gabi Roblero is a 44 year old female presenting with a chief complaint of sore throat and cough for 2 weeks, wants refill of inhalers.    Sister present with patient, translates    Has asthma, needs inhaler, had called primary provider and went to pharmacy but told that did not have RX.  Has been using Symbicort regularly and does help with symptoms.  Also needs albuterol inhaler.    Had been having sore throat for 2 weeks.  No fever.  Cough but is usually due to asthma.    Family has been sick.  Did not obtain flu vaccination this season.    Has not obtain COVID test yet    Had GERD, is taking omeprazole for long time but will only take as needed.      Past Medical History:   Diagnosis Date    GERD (gastroesophageal reflux disease)     Hypothyroidism     Moderate persistent asthma 4/28/2016     Current Outpatient Medications   Medication Sig Dispense Refill    acetaminophen (TYLENOL) 325 MG tablet Take 2 tablets (650 mg) by mouth every 4 hours as needed for mild pain or fever (greater than or equal to 38  C /100.4  F (oral) or 38.5  C/ 101.4  F (core).) 100 tablet 1    albuterol (PROVENTIL) (2.5 MG/3ML) 0.083% neb solution Take 1 vial (2.5 mg) by nebulization every 6 hours as needed for shortness of breath or wheezing 90 mL 1    albuterol (VENTOLIN HFA) 108 (90 Base) MCG/ACT inhaler Inhale 1-2 puffs into the lungs every 6 hours as needed for shortness of breath or wheezing 18 g 0    budesonide-formoterol (SYMBICORT) 80-4.5 MCG/ACT Inhaler Inhale 2 puffs into the lungs 2 times daily 10.2 g 0    EPINEPHrine (ANY BX GENERIC EQUIV) 0.3 MG/0.3ML injection 2-pack Inject 0.3 mLs (0.3 mg) into the muscle once as needed for anaphylaxis 1 each 1    levothyroxine (SYNTHROID/LEVOTHROID) 150 MCG tablet TAKE ONE TABLET BY MOUTH ONCE DAILY AND TAKE AN EXTRA HALF TABLET TWICE A WEEK 68 tablet 0    omeprazole (PRILOSEC) 40 MG DR capsule Take 1 capsule (40 mg) by mouth daily 90 capsule 1    order for DME  Equipment being ordered: Nebulizer 1 Device 0    predniSONE (DELTASONE) 20 MG tablet Take 2 tablets (40 mg) by mouth daily for 5 days 10 tablet 0    Lidocaine (LIDOCARE) 4 % Patch Place 1 patch onto the skin every 24 hours To prevent lidocaine toxicity, patient should be patch free for 12 hrs daily. (Patient not taking: Reported on 3/15/2024) 10 patch 0     Social History     Tobacco Use    Smoking status: Never    Smokeless tobacco: Never   Substance Use Topics    Alcohol use: No     Alcohol/week: 0.0 standard drinks of alcohol       ROS:  Review of systems negative except as stated above.    OBJECTIVE:  /76   Pulse 78   Temp 97.7  F (36.5  C) (Oral)   Wt 137 kg (302 lb)   SpO2 98%   BMI 44.60 kg/m    GENERAL APPEARANCE: healthy, alert and no distress  EYES: EOMI,  PERRL, conjunctiva clear  HENT: ear canals and TM's normal.  Nose and mouth without ulcers, erythema or lesions  RESP: lungs with decrease BS, no crackles or wheezes  PSYCH: mentation appears normal and affect normal/bright    Results for orders placed or performed in visit on 03/15/24   Streptococcus A Rapid Screen w/Reflex to PCR     Status: Normal    Specimen: Throat; Swab   Result Value Ref Range    Group A Strep antigen Negative Negative   Influenza A & B Antigen     Status: Normal    Specimen: Nose; Swab   Result Value Ref Range    Influenza A antigen Negative Negative    Influenza B antigen Negative Negative    Narrative    Test results must be correlated with clinical data. If necessary, results should be confirmed by a molecular assay or viral culture.       ASSESSMENT/PLAN:  (J02.9) Sore throat  (primary encounter diagnosis)  Plan: Streptococcus A Rapid Screen w/Reflex to PCR,         Influenza A & B Antigen, Group A Streptococcus         PCR Throat Swab            (J45.40) Moderate persistent asthma without complication  Plan: budesonide-formoterol (SYMBICORT) 80-4.5         MCG/ACT Inhaler, albuterol (VENTOLIN HFA) 108         (90  Base) MCG/ACT inhaler, predniSONE         (DELTASONE) 20 MG tablet            Reassurance given, reviewed that prolong sore throat may be due to GERD and encourage to take omeprazole daily for 2 weeks minimum instead of as needed.  Will follow up on throat culture and treat if positive for strep.    Discussed asthma and that future refill of inhalers will need to be thru primary provider.  RX Symbicort and RX albuterol inhaler given.  Discussed that is worsening asthma, will give RX prednisone burst.    Follow up with primary provider if no improvement of symptoms in 1-2 weeks    Vitor Fernandez MD  March 15, 2024 6:11 PM

## 2024-03-16 LAB — GROUP A STREP BY PCR: NOT DETECTED

## 2024-03-19 RX ORDER — DILTIAZEM HYDROCHLORIDE 60 MG/1
TABLET, FILM COATED ORAL
Qty: 30.6 G | Refills: 0 | OUTPATIENT
Start: 2024-03-19

## 2024-04-10 DIAGNOSIS — J45.40 MODERATE PERSISTENT ASTHMA WITHOUT COMPLICATION: ICD-10-CM

## 2024-04-10 NOTE — LETTER
April 15, 2024      Gabi Roblero  [address] [address] [address]        Dear Gabi,     We have received a refill request from your pharmacy for your medication - Symbicort inhalor. Many medications require routine follow-up with your provider and a review of your chart indicates that you are due for follow up appointment with Dr. Garcia prior to any further refills.     We have sent a one time refill to your pharmacy to allow you time to schedule an appointment.     Please call [phone] to schedule an appointment.  We look forward to seeing you in the near future.      Thank you,     [facility]

## 2024-04-10 NOTE — TELEPHONE ENCOUNTER
Patient's sister called in regarding this again. Consent to communicate on file.     Patient's sister states she called and requested the refills for her inhalers and she needs them sent as soon as possible as the pharmacy closes in 20 minutes.    Advised sister that the encounter was sent to the provider, but I will send another note. Advised sister if she feels patient needs the inhalers now and they do not hear back, they should go to UC/ED for evaluation and request for prescription.     Gail Ambrocio, BSN, RN

## 2024-04-10 NOTE — TELEPHONE ENCOUNTER
Medication Question or Refill    Contacts         Type Contact Phone/Fax    04/10/2024 04:16 PM CDT Interface (Incoming) [facility] [address] [facility] [phone]            What medication are you calling about (include dose and sig)?:    budesonide-formoterol (SYMBICORT) 80-4.5 MCG/ACT Inhaler     albuterol (PROVENTIL) (2.5 MG/3ML) 0.083% neb solution Take 1 vial (2.5 mg) by nebulization every 6 hours as needed for shortness of breath or wheezing    albuterol (VENTOLIN HFA) 108 (90 Base) MCG/ACT inhaler       Preferred Pharmacy:   [place], [place] - [address]  [facility] [address] [place] [id number]  Phone: [phone] Fax: [phone] Alternate Fax: [phone], [phone]    [facility] [address] [id number] [place] [address]  Phone: [phone] Fax: [phone]      Controlled Substance Agreement on file:   CSA -- Patient Level:    CSA: None found at the patient level.       Who prescribed the medication?: PCP    Do you need a refill? Yes    When did you use the medication last? 4/9/2024    Patient offered an appointment? No    Do you have any questions or concerns?  Yes: Pt has no refills, needs this ASAP      Okay to leave a detailed message?: Yes at Cell number on file:    Telephone Information:   Mobile [phone]

## 2024-04-10 NOTE — TELEPHONE ENCOUNTER
Sister calls with patient about a refill request for inhalers.      Advised request will be send to the refill team can take up to 72 hours.    Sister states she needs the medication today.     Sister is with patient and would like you to call her phone. Ctc on file from 2017    Maynor Farley (Sister)  [phone] (Mobile)     Inhaler does not pass protocol.  Routing to refill team and provider-writer is not sure if asia refill can be given by refill team.

## 2024-04-11 RX ORDER — DILTIAZEM HYDROCHLORIDE 60 MG/1
2 TABLET, FILM COATED ORAL 2 TIMES DAILY
Qty: 10.2 G | Refills: 1 | Status: SHIPPED | OUTPATIENT
Start: 2024-04-11 | End: 2024-07-09

## 2024-04-11 NOTE — TELEPHONE ENCOUNTER
Attempt # 1  Called Phone # [phone]      Using [employer]  # (unable to hear/obtain) a non-detailed voicemail left was left on April 11, 2024 1:17 PM to call clinic at: [phone].     On Call Back:     Please relay provider message below.      Thank you,  Malachi Aranda, Triage RN Joyce Piña  1:17 PM 4/11/2024

## 2024-04-11 NOTE — TELEPHONE ENCOUNTER
I have not seen since 2022  She has been seeing Parpia   Will send a refill but she needs to set up follow up apppt with Jose for ongoing refills

## 2024-04-12 NOTE — TELEPHONE ENCOUNTER
Attempt # 2  Called Phone # [phone] (Sirad - patient's sister, consent to communicate on file)     Was Call answered? no     Non-detailed voicemail left on April 12, 2024 10:04 AM to call clinic at: [phone].     On Call Back:     Please relay message message below and help set up appointment if possible.      Thank you,  Malachi Aranda, Triage RN [facility]  10:04 AM 4/12/2024

## 2024-04-15 NOTE — TELEPHONE ENCOUNTER
3rd Attempt. Letter printed to be mailed to patient.    Thank you,  Malachi Aranda, Triage RN [place]  11:38 AM 4/15/2024

## 2024-05-13 NOTE — TELEPHONE ENCOUNTER
Arnuity Ellipta faxed  
Patient calling back with , advised Rx sent to pharmacy and advised to use once daily.   Patient asks if Vitamin D and Iron refills are available, advised that should contact pharmacy to obtain a refill. Refills remain.  
Through [place]  [facility] [phone] left message to let pt know new Rx sent to pharmacy.  Gail Lerma MA  
qvar 40 mg needs a prior auth,( please call [phone] her insurance is [employer] [facility] and her id # is [id number].)  The insurance prefers Arnuity Ellipta for anyone over age 12 if you would rather change it to that and send us a new rx. Thanks Lacy  On behalf of [facility].       
Instructions: This plan will send the code FBSE to the PM system.  DO NOT or CHANGE the price.
Price (Do Not Change): 0.00
Detail Level: Simple

## 2024-05-21 ENCOUNTER — TELEPHONE (OUTPATIENT)
Dept: INTERNAL MEDICINE | Facility: CLINIC | Age: 44
End: 2024-05-21
Payer: COMMERCIAL

## 2024-05-21 DIAGNOSIS — E03.8 OTHER SPECIFIED HYPOTHYROIDISM: ICD-10-CM

## 2024-05-22 RX ORDER — LEVOTHYROXINE SODIUM 150 UG/1
TABLET ORAL
Qty: 96 TABLET | Refills: 0 | Status: SHIPPED | OUTPATIENT
Start: 2024-05-22 | End: 2024-07-24

## 2024-05-22 NOTE — TELEPHONE ENCOUNTER
Last OV - 08/30/2023.      Called and spoke with patient using [employer]  # [phone] to discuss provider message. Appointment scheduled.     Jul 24, 2024  1:00 PM  (Arrive by 12:40 PM)  Adult Preventative Visit with Bijal Garcia MD  [facility] ([facility] ) [phone]     Thank you,  Malachi Aranda, Triage RN [facility]  1:53 PM 5/22/2024

## 2024-07-05 DIAGNOSIS — J45.40 MODERATE PERSISTENT ASTHMA WITHOUT COMPLICATION: ICD-10-CM

## 2024-07-09 RX ORDER — DILTIAZEM HYDROCHLORIDE 60 MG/1
2 TABLET, FILM COATED ORAL 2 TIMES DAILY
Qty: 10.2 G | Refills: 1 | Status: SHIPPED | OUTPATIENT
Start: 2024-07-09 | End: 2024-10-03

## 2024-07-09 RX ORDER — ALBUTEROL SULFATE 90 UG/1
AEROSOL, METERED RESPIRATORY (INHALATION)
Qty: 54 G | Refills: 0 | Status: SHIPPED | OUTPATIENT
Start: 2024-07-09

## 2024-07-09 NOTE — TELEPHONE ENCOUNTER
Sister calls to receive update on medication. Writer explained medication has already been refilled.     Summer RN 4:43 PM July 9, 2024   [facility]

## 2024-07-24 ENCOUNTER — OFFICE VISIT (OUTPATIENT)
Dept: INTERNAL MEDICINE | Facility: CLINIC | Age: 44
End: 2024-07-24
Payer: COMMERCIAL

## 2024-07-24 VITALS
SYSTOLIC BLOOD PRESSURE: 120 MMHG | BODY MASS INDEX: 43.4 KG/M2 | HEART RATE: 72 BPM | OXYGEN SATURATION: 92 % | DIASTOLIC BLOOD PRESSURE: 78 MMHG | TEMPERATURE: 98.3 F | HEIGHT: 69 IN | RESPIRATION RATE: 16 BRPM | WEIGHT: 293 LBS

## 2024-07-24 DIAGNOSIS — E03.8 OTHER SPECIFIED HYPOTHYROIDISM: ICD-10-CM

## 2024-07-24 DIAGNOSIS — Z00.00 ROUTINE GENERAL MEDICAL EXAMINATION AT A HEALTH CARE FACILITY: Primary | ICD-10-CM

## 2024-07-24 DIAGNOSIS — K45.8 OTHER SPECIFIED ABDOMINAL HERNIA WITHOUT OBSTRUCTION OR GANGRENE: ICD-10-CM

## 2024-07-24 DIAGNOSIS — J45.40 MODERATE PERSISTENT ASTHMA WITHOUT COMPLICATION: ICD-10-CM

## 2024-07-24 DIAGNOSIS — Z13.6 CARDIOVASCULAR SCREENING; LDL GOAL LESS THAN 130: ICD-10-CM

## 2024-07-24 DIAGNOSIS — Z13.1 SCREENING FOR DIABETES MELLITUS: ICD-10-CM

## 2024-07-24 DIAGNOSIS — K21.9 GASTROESOPHAGEAL REFLUX DISEASE WITHOUT ESOPHAGITIS: ICD-10-CM

## 2024-07-24 LAB
ALBUMIN SERPL BCG-MCNC: 4 G/DL (ref 3.5–5.2)
ALP SERPL-CCNC: 75 U/L (ref 40–150)
ALT SERPL W P-5'-P-CCNC: 9 U/L (ref 0–50)
ANION GAP SERPL CALCULATED.3IONS-SCNC: 9 MMOL/L (ref 7–15)
AST SERPL W P-5'-P-CCNC: 18 U/L (ref 0–45)
BILIRUB SERPL-MCNC: 0.5 MG/DL
BUN SERPL-MCNC: 9.6 MG/DL (ref 6–20)
CALCIUM SERPL-MCNC: 9 MG/DL (ref 8.8–10.4)
CHLORIDE SERPL-SCNC: 104 MMOL/L (ref 98–107)
CHOLEST SERPL-MCNC: 146 MG/DL
CREAT SERPL-MCNC: 0.56 MG/DL (ref 0.51–0.95)
EGFRCR SERPLBLD CKD-EPI 2021: >90 ML/MIN/1.73M2
FASTING STATUS PATIENT QL REPORTED: YES
FASTING STATUS PATIENT QL REPORTED: YES
GLUCOSE SERPL-MCNC: 85 MG/DL (ref 70–99)
HBA1C MFR BLD: 5.1 % (ref 0–5.6)
HCO3 SERPL-SCNC: 26 MMOL/L (ref 22–29)
HDLC SERPL-MCNC: 62 MG/DL
LDLC SERPL CALC-MCNC: 70 MG/DL
NONHDLC SERPL-MCNC: 84 MG/DL
POTASSIUM SERPL-SCNC: 4 MMOL/L (ref 3.4–5.3)
PROT SERPL-MCNC: 7.6 G/DL (ref 6.4–8.3)
SODIUM SERPL-SCNC: 139 MMOL/L (ref 135–145)
TRIGL SERPL-MCNC: 72 MG/DL
TSH SERPL DL<=0.005 MIU/L-ACNC: 2.75 UIU/ML (ref 0.3–4.2)

## 2024-07-24 PROCEDURE — 83036 HEMOGLOBIN GLYCOSYLATED A1C: CPT | Performed by: INTERNAL MEDICINE

## 2024-07-24 PROCEDURE — 99396 PREV VISIT EST AGE 40-64: CPT | Performed by: INTERNAL MEDICINE

## 2024-07-24 PROCEDURE — 84443 ASSAY THYROID STIM HORMONE: CPT | Performed by: INTERNAL MEDICINE

## 2024-07-24 PROCEDURE — 80061 LIPID PANEL: CPT | Performed by: INTERNAL MEDICINE

## 2024-07-24 PROCEDURE — 80053 COMPREHEN METABOLIC PANEL: CPT | Performed by: INTERNAL MEDICINE

## 2024-07-24 PROCEDURE — 36415 COLL VENOUS BLD VENIPUNCTURE: CPT | Performed by: INTERNAL MEDICINE

## 2024-07-24 PROCEDURE — 99214 OFFICE O/P EST MOD 30 MIN: CPT | Mod: 25 | Performed by: INTERNAL MEDICINE

## 2024-07-24 RX ORDER — LEVOTHYROXINE SODIUM 150 UG/1
TABLET ORAL
Qty: 96 TABLET | Refills: 0 | Status: SHIPPED | OUTPATIENT
Start: 2024-07-24

## 2024-07-24 RX ORDER — ALBUTEROL SULFATE 0.83 MG/ML
2.5 SOLUTION RESPIRATORY (INHALATION) EVERY 6 HOURS PRN
Qty: 90 ML | Refills: 1 | Status: SHIPPED | OUTPATIENT
Start: 2024-07-24

## 2024-07-24 RX ORDER — OMEPRAZOLE 40 MG/1
40 CAPSULE, DELAYED RELEASE ORAL DAILY
Qty: 90 CAPSULE | Refills: 1 | Status: SHIPPED | OUTPATIENT
Start: 2024-07-24

## 2024-07-24 SDOH — HEALTH STABILITY: PHYSICAL HEALTH: ON AVERAGE, HOW MANY MINUTES DO YOU ENGAGE IN EXERCISE AT THIS LEVEL?: 30 MIN

## 2024-07-24 SDOH — HEALTH STABILITY: PHYSICAL HEALTH: ON AVERAGE, HOW MANY DAYS PER WEEK DO YOU ENGAGE IN MODERATE TO STRENUOUS EXERCISE (LIKE A BRISK WALK)?: 2 DAYS

## 2024-07-24 ASSESSMENT — ASTHMA QUESTIONNAIRES
QUESTION_4 LAST FOUR WEEKS HOW OFTEN HAVE YOU USED YOUR RESCUE INHALER OR NEBULIZER MEDICATION (SUCH AS ALBUTEROL): TWO OR THREE TIMES PER WEEK
ACT_TOTALSCORE: 19
ACT_TOTALSCORE: 19
QUESTION_1 LAST FOUR WEEKS HOW MUCH OF THE TIME DID YOUR ASTHMA KEEP YOU FROM GETTING AS MUCH DONE AT WORK, SCHOOL OR AT HOME: NONE OF THE TIME
QUESTION_5 LAST FOUR WEEKS HOW WOULD YOU RATE YOUR ASTHMA CONTROL: WELL CONTROLLED
QUESTION_2 LAST FOUR WEEKS HOW OFTEN HAVE YOU HAD SHORTNESS OF BREATH: NOT AT ALL
QUESTION_3 LAST FOUR WEEKS HOW OFTEN DID YOUR ASTHMA SYMPTOMS (WHEEZING, COUGHING, SHORTNESS OF BREATH, CHEST TIGHTNESS OR PAIN) WAKE YOU UP AT NIGHT OR EARLIER THAN USUAL IN THE MORNING: TWO OR THREE NIGHTS A WEEK

## 2024-07-24 ASSESSMENT — SOCIAL DETERMINANTS OF HEALTH (SDOH): HOW OFTEN DO YOU GET TOGETHER WITH FRIENDS OR RELATIVES?: MORE THAN THREE TIMES A WEEK

## 2024-07-24 NOTE — LETTER
July 26, 2024      Gabi Roblero  [address] [address] [address]        Dear Ms.Mousa Roblero,    We are writing to inform you of your test results.    Your test results fall within the expected range(s) or remain unchanged from previous results.  Please continue with current treatment plan.    Resulted Orders   TSH with free T4 reflex   Result Value Ref Range    TSH 2.75 0.30 - 4.20 uIU/mL   Lipid panel reflex to direct LDL Fasting   Result Value Ref Range    Cholesterol 146 <200 mg/dL    Triglycerides 72 <150 mg/dL    Direct Measure HDL 62 >=50 mg/dL    LDL Cholesterol Calculated 70 <=100 mg/dL    Non HDL Cholesterol 84 <130 mg/dL    Patient Fasting > 8hrs? Yes     Narrative    Cholesterol  Desirable:  <200 mg/dL    Triglycerides  Normal:  Less than 150 mg/dL  Borderline High:  150-199 mg/dL  High:  200-499 mg/dL  Very High:  Greater than or equal to 500 mg/dL    Direct Measure HDL  Female:  Greater than or equal to 50 mg/dL   Male:  Greater than or equal to 40 mg/dL    LDL Cholesterol  Desirable:  <100mg/dL  Above Desirable:  100-129 mg/dL   Borderline High:  130-159 mg/dL   High:  160-189 mg/dL   Very High:  >= 190 mg/dL    Non HDL Cholesterol  Desirable:  130 mg/dL  Above Desirable:  130-159 mg/dL  Borderline High:  160-189 mg/dL  High:  190-219 mg/dL  Very High:  Greater than or equal to 220 mg/dL   Comprehensive metabolic panel   Result Value Ref Range    Sodium 139 135 - 145 mmol/L    Potassium 4.0 3.4 - 5.3 mmol/L    Carbon Dioxide (CO2) 26 22 - 29 mmol/L    Anion Gap 9 7 - 15 mmol/L    Urea Nitrogen 9.6 6.0 - 20.0 mg/dL    Creatinine 0.56 0.51 - 0.95 mg/dL    GFR Estimate >90 >60 mL/min/1.73m2      Comment:      eGFR calculated using 2021 CKD-EPI equation.    Calcium 9.0 8.8 - 10.4 mg/dL      Comment:      Reference intervals for this test were updated on 7/16/2024 to reflect our healthy population more accurately. There may be differences in the flagging of prior results with similar  values performed with this method. Those prior results can be interpreted in the context of the updated reference intervals.    Chloride 104 98 - 107 mmol/L    Glucose 85 70 - 99 mg/dL    Alkaline Phosphatase 75 40 - 150 U/L    AST 18 0 - 45 U/L    ALT 9 0 - 50 U/L    Protein Total 7.6 6.4 - 8.3 g/dL    Albumin 4.0 3.5 - 5.2 g/dL    Bilirubin Total 0.5 <=1.2 mg/dL    Patient Fasting > 8hrs? Yes    Hemoglobin A1c   Result Value Ref Range    Hemoglobin A1C 5.1 0.0 - 5.6 %      Comment:      Normal <5.7%   Prediabetes 5.7-6.4%    Diabetes 6.5% or higher     Note: Adopted from ADA consensus guidelines.       The results are within acceptable range and satisfactory.  Please do not hesitate to reach out if any further questions/concerns.       Sincerely,      Bijal Garcia MD

## 2024-09-26 ENCOUNTER — TELEPHONE (OUTPATIENT)
Dept: INTERNAL MEDICINE | Facility: CLINIC | Age: 44
End: 2024-09-26
Payer: COMMERCIAL

## 2024-09-26 NOTE — LETTER
September 26, 2024    To  Gabi Roblero  [address] [address] [address]    Your team at [facility] cares about your health. We have reviewed your chart and based on our findings; we are making the following recommendations to better manage your health.     You are in particular need of attention regarding the following:     Schedule Annual MAMMOGRAPHY. [facility] scheduling number is [phone] or schedule in [employer] (self referral).  1 in 8 women will develop invasive breast cancer during her lifetime and it is the most common non-skin cancer in American Women. EARLY detection, new treatments, and a better understanding of the disease have increased survival rates- the 5 year survival rate in the 1960's was 63% and today it is close to 90%.  If you are under/uninsured, we recommend you contact the [facility]. They offer mammograms at no charge or on a sliding fee charge. You can schedule with them at [phone]. Please have them send us the results.     If you have already completed these items, please contact the clinic via phone or   [employer] so your care team can review and update your records. Thank you for   choosing [facility] for your healthcare needs. For any questions,   concerns, or to schedule an appointment please contact our clinic.    Healthy Regards,      Your [facility] Care Team

## 2024-09-26 NOTE — TELEPHONE ENCOUNTER
Patient Quality Outreach    Patient is due for the following:   Breast Cancer Screening - Mammogram    Next Steps:   Schedule a office visit for Mammogram    Type of outreach:    Sent letter.      Questions for provider review:    None           Henry Davis MA  Chart routed to none.

## 2024-10-03 DIAGNOSIS — J45.40 MODERATE PERSISTENT ASTHMA WITHOUT COMPLICATION: ICD-10-CM

## 2024-10-03 NOTE — TELEPHONE ENCOUNTER
Medication Question or Refill        What medication are you calling about (include dose and sig)?: Symbicort 80-4.5mcg inhale two puffs by mouth 2x daily    [place]     [facility] [place] [address] [address] [address]  Phone: [phone] Fax: [phone]      Controlled Substance Agreement on file:   CSA -- Patient Level:    CSA: None found at the patient level.       Who prescribed the medication?: Parpia    Do you need a refill? Yes    When did you use the medication last? today    Patient offered an appointment? No    Do you have any questions or concerns?  No      Okay to leave a detailed message?:

## 2024-10-04 RX ORDER — BUDESONIDE AND FORMOTEROL FUMARATE DIHYDRATE 80; 4.5 UG/1; UG/1
2 AEROSOL RESPIRATORY (INHALATION) 2 TIMES DAILY
Qty: 10.2 G | Refills: 1 | Status: SHIPPED | OUTPATIENT
Start: 2024-10-04

## 2024-10-09 DIAGNOSIS — E03.8 OTHER SPECIFIED HYPOTHYROIDISM: ICD-10-CM

## 2024-10-09 RX ORDER — LEVOTHYROXINE SODIUM 150 UG/1
TABLET ORAL
Qty: 90 TABLET | Refills: 2 | Status: SHIPPED | OUTPATIENT
Start: 2024-10-09

## 2024-11-06 DIAGNOSIS — J45.40 MODERATE PERSISTENT ASTHMA WITHOUT COMPLICATION: ICD-10-CM

## 2024-11-07 RX ORDER — ALBUTEROL SULFATE 90 UG/1
AEROSOL, METERED RESPIRATORY (INHALATION)
Qty: 54 G | Refills: 0 | Status: SHIPPED | OUTPATIENT
Start: 2024-11-07

## 2025-01-28 DIAGNOSIS — E03.8 OTHER SPECIFIED HYPOTHYROIDISM: ICD-10-CM

## 2025-01-29 RX ORDER — LEVOTHYROXINE SODIUM 150 UG/1
TABLET ORAL
Qty: 96 TABLET | Refills: 0 | OUTPATIENT
Start: 2025-01-29

## 2025-03-04 DIAGNOSIS — J45.40 MODERATE PERSISTENT ASTHMA WITHOUT COMPLICATION: ICD-10-CM

## 2025-03-05 RX ORDER — DILTIAZEM HYDROCHLORIDE 60 MG/1
2 TABLET, FILM COATED ORAL 2 TIMES DAILY
Qty: 10.2 G | Refills: 1 | Status: SHIPPED | OUTPATIENT
Start: 2025-03-05

## 2025-03-10 ENCOUNTER — OFFICE VISIT (OUTPATIENT)
Dept: URGENT CARE | Facility: URGENT CARE | Age: 45
End: 2025-03-10
Payer: COMMERCIAL

## 2025-03-10 VITALS
BODY MASS INDEX: 45.48 KG/M2 | WEIGHT: 293 LBS | OXYGEN SATURATION: 98 % | TEMPERATURE: 98.2 F | DIASTOLIC BLOOD PRESSURE: 78 MMHG | SYSTOLIC BLOOD PRESSURE: 128 MMHG | HEART RATE: 70 BPM | RESPIRATION RATE: 14 BRPM

## 2025-03-10 DIAGNOSIS — H66.002 ACUTE SUPPURATIVE OTITIS MEDIA OF LEFT EAR WITHOUT SPONTANEOUS RUPTURE OF TYMPANIC MEMBRANE, RECURRENCE NOT SPECIFIED: Primary | ICD-10-CM

## 2025-03-10 PROCEDURE — 3074F SYST BP LT 130 MM HG: CPT | Performed by: NURSE PRACTITIONER

## 2025-03-10 PROCEDURE — 3078F DIAST BP <80 MM HG: CPT | Performed by: NURSE PRACTITIONER

## 2025-03-10 PROCEDURE — 99213 OFFICE O/P EST LOW 20 MIN: CPT | Performed by: NURSE PRACTITIONER

## 2025-03-10 RX ORDER — PENICILLIN V POTASSIUM 500 MG/1
500 TABLET, FILM COATED ORAL 2 TIMES DAILY
Qty: 14 TABLET | Refills: 0 | Status: SHIPPED | OUTPATIENT
Start: 2025-03-10 | End: 2025-03-17

## 2025-03-10 NOTE — PATIENT INSTRUCTIONS
Penicillin twice a day for 7 days    Push fluids  Lots of handwashing.   Ibuprofen as needed for fever or pain  Delsymor dayquil/nyquil for cough as needed     Rest as able.   F/u in the clinic if symptoms persist or worsen.

## 2025-04-21 NOTE — PROGRESS NOTES
ASSESSMENT & PLAN    Discussed diagnosis and treatment options with the patient today. A shared decision making model was used. The patient's values and choices were respected. The following represents what was discussed and decided upon by the provider and the patient.     Gabi was seen today for pain.    Diagnoses and all orders for this visit:    Arthritis of right sternoclavicular joint  -     Physical Therapy  Referral; Future  -     tiZANidine (ZANAFLEX) 4 MG tablet; Take 1 tablet (4 mg) by mouth 2 times daily.    Other orders  -     Orthopedic  Referral  -     XR Sternoclavicular Joint G/E 3 Views; Future      Pain at distal and proximal clavicle AC and sternoclavicular joints. Normal alignment and no bony fracture reassuring. Xrs showing degenerative change most likely causing pain at both joints. No difficulties breathing and neurovascularly intact shoulder reassuring less likely needing immediate surgical referral or further imaging at this time but can continue to monitor if symptoms progress or do not improve. Gross strength and range of motion of rotator cuff intact.   Plan as below:   - pain at clavicle joints. Joints showing arthritis of both joints today  - Tizanidine 4mg twice daily for two weeks to help with muscles Sent to pharmacy. Discussed medication side effects and risks.   --Tylenol arthritis twice a day for two weeks then can use as needed.   - Lidocaine cream or gel or patches. Can over the counter in drug store aisle  - Heat pack to neck and shoulder for muscles  - Physical therapy ordered. They should call to schedule in 1-2 business day.  If call missed the number to call in AVS below.   -Can consider cortisone injection if not improved to AC and sternoclavicular joints.  -Follow-up if no improvement in symptoms in one month after PT     Mary Jimenez DO  [facility] [place]    Due to language barrier, an  was prfferred and  deferred for family member who was present during the history-taking and subsequent discussion and physical exam with this patient.     -----  Chief Complaint   Patient presents with    Right Shoulder - Pain       SUBJECTIVE  Gabi Roblero is a/an 45 year old female who is seen in consultation at the request of  Justin De La Rosa PA-C for evaluation of right shoulder pain.     The patient is seen their niece who interprets.  The patient is Right handed    Onset: 1 month(s) ago. Reports insidious onset without acute precipitating event.  Location of Pain: right sternoclavicular joint  Worsened by: working and sleeping on their side  Better with: Tylenol  Treatments tried: rest/activity avoidance, ice, and Tylenol  Associated symptoms: swelling  Denies numbness, tingling, locking  Orthopedic/Surgical history: NO  Social History/Occupation: Works [employer]     Reviewed previous UC note and referral for shoulder pain.     Patient Active Problem List   Diagnosis    Gastritis    CARDIOVASCULAR SCREENING; LDL GOAL LESS THAN 160    Vitamin D deficiency    GERD (gastroesophageal reflux disease)    Rotator cuff syndrome    Moderate persistent asthma    Other specified hypothyroidism    Morbid obesity (H)    Hypothyroidism    Strain of neck muscle, initial encounter       Current Outpatient Medications   Medication Sig Dispense Refill    acetaminophen (TYLENOL) 325 MG tablet Take 2 tablets (650 mg) by mouth every 4 hours as needed for mild pain or fever (greater than or equal to 38  C /100.4  F (oral) or 38.5  C/ 101.4  F (core).) (Patient not taking: Reported on 3/21/2025) 100 tablet 1    albuterol (PROVENTIL) (2.5 MG/3ML) 0.083% neb solution Take 1 vial (2.5 mg) by nebulization every 6 hours as needed for shortness of breath or wheezing 90 mL 1    EPINEPHrine (ANY BX GENERIC EQUIV) 0.3 MG/0.3ML injection 2-pack Inject 0.3 mLs (0.3 mg) into the muscle once as needed for anaphylaxis 1 each 1    levothyroxine  (SYNTHROID/LEVOTHROID) 150 MCG tablet TAKE ONE TABLET BY MOUTH ONCE DAILY AND TAKE 1.5 TABLETS ON SUNDAY 90 tablet 2    Lidocaine (LIDOCARE) 4 % Patch Place 1 patch onto the skin every 24 hours To prevent lidocaine toxicity, patient should be patch free for 12 hrs daily. (Patient not taking: Reported on 3/21/2025) 10 patch 0    omeprazole (PRILOSEC) 40 MG DR capsule Take 1 capsule (40 mg) by mouth daily 90 capsule 1    order for DME Equipment being ordered: Nebulizer 1 Device 0    SYMBICORT 80-4.5 MCG/ACT Inhaler INHALE TWO PUFFS BY MOUTH TWICE A DAY 10.2 g 1    VENTOLIN  (90 Base) MCG/ACT inhaler INHALE ONE TO TWO PUFFS BY MOUTH EVERY 6 HOURS AS NEEDED FOR SHORTNESS OF BREATH, DYSPNEA OR WHEEZING 54 g 0       PMH, Medications and Allergies were reviewed and updated as needed.    REVIEW OF SYSTEMS:  10 point ROS is negative other than symptoms noted above in HPI    OBJECTIVE:  LMP 03/05/2025 (Approximate)    General: healthy, alert and in no distress  Skin: no suspicious lesions or rash.  CV: distal perfusion intact   Resp: normal respiratory effort without conversational dyspnea   Psych: normal mood and affect  Gait: NORMAL  Neuro: Normal light sensory exam of RU extremity     RIGHT SHOULDER  Inspection:    no swelling, bruising, discoloration, or obvious deformity or asymmetry  Palpation:    Tender about the SC joint and AC joint. Remainder of bony and tendinous landmarks are nontender.    Crepitus is Absent  Active Range of Motion:     Abduction 1800 / FF 1800 /  / IR SI  Opposite arm abduction/flexion/ER full, IR SI joint  Strength:    Scapular plane abduction 5/5 / ER 5/5 / IR 5/5 / biceps 5/5 / Special Tests:    Positive: crossed arm adduction pain at AC and SC    Negative: supraspinatus (empty can) pain at SC    RADIOLOGY:  Final results and radiologist's interpretation, available in the [facility] health record.  Images were reviewed with the patient in the office today.    My personal interpretation  of the performed imaging:     Radiographs of the right - 1 views 4/24/2025  Normal alignment of sternoclavicular and AC joint. Degenerative changes of sternoclavicular and AC joint.       Review of the result(s) of each unique test - XRs  Ordering of each unique test  Prescription drug management    Xrays        Disclaimer: This note consists of symbols derived from keyboarding, dictation and/or voice recognition software. As a result, there may be errors in the script that have gone undetected. Please consider this when interpreting information found in this chart.

## 2025-04-23 ENCOUNTER — OFFICE VISIT (OUTPATIENT)
Dept: ORTHOPEDICS | Facility: CLINIC | Age: 45
End: 2025-04-23
Attending: PHYSICIAN ASSISTANT
Payer: COMMERCIAL

## 2025-04-23 ENCOUNTER — ANCILLARY PROCEDURE (OUTPATIENT)
Dept: GENERAL RADIOLOGY | Facility: CLINIC | Age: 45
End: 2025-04-23
Attending: STUDENT IN AN ORGANIZED HEALTH CARE EDUCATION/TRAINING PROGRAM
Payer: COMMERCIAL

## 2025-04-23 DIAGNOSIS — M19.011 ARTHRITIS OF RIGHT STERNOCLAVICULAR JOINT: Primary | ICD-10-CM

## 2025-04-23 DIAGNOSIS — M25.511 PAIN OF RIGHT STERNOCLAVICULAR JOINT: ICD-10-CM

## 2025-04-23 PROCEDURE — 99204 OFFICE O/P NEW MOD 45 MIN: CPT | Performed by: STUDENT IN AN ORGANIZED HEALTH CARE EDUCATION/TRAINING PROGRAM

## 2025-04-23 PROCEDURE — 71130 X-RAY STRENOCLAVIC JT 3/>VWS: CPT | Mod: TC | Performed by: RADIOLOGY

## 2025-04-23 NOTE — PATIENT INSTRUCTIONS
1. Pain of right sternoclavicular joint    2. Sprain of right sternoclavicular joint, initial encounter      - pain at clavicle joints. Joints showing arthritis of both joints today  - Tizanidine 4mg twice daily for two weeks to help with muscles Sent to pharmacy. Discussed medication side effects and risks.   --Tylenol arthritis twice a day for two weeks  - Lidocaine cream or gel or patches. Can over the counter in drug store aisle  - Heat pack to neck and shoulder for muscles  - Physical therapy ordered. They should call to schedule in 1-2 business day.  If call missed the number to call in AVS below.     Please call [phone]  Ask for my team if you have any questions or concerns  Mary Jimenez DO  [facility]  Thank you for choosing [facility]!    CLINIC LOCATIONS:     [place]  TRIAGE LINE: [phone] [address] APPOINTMENTS: [phone]   [place] [address] RADIOLOGY: [phone]   (Monday, Thursday & Friday) PHYSICAL THERAPY: [phone]    BILLING QUESTIONS: [phone]   [place] FAX: [phone] [address]    [place] [address]    (Wednesday)

## 2025-04-23 NOTE — LETTER
4/23/2025      Gabi Roblero  [address] [address] [address]      Dear Colleague,    Thank you for referring your patient, Gabi Roblero, to the [facility] [place]. Please see a copy of my visit note below.    ASSESSMENT & PLAN    Discussed diagnosis and treatment options with the patient today. A shared decision making model was used. The patient's values and choices were respected. The following represents what was discussed and decided upon by the provider and the patient.     Gabi was seen today for pain.    Diagnoses and all orders for this visit:    Arthritis of right sternoclavicular joint  -     Physical Therapy  Referral; Future  -     tiZANidine (ZANAFLEX) 4 MG tablet; Take 1 tablet (4 mg) by mouth 2 times daily.    Other orders  -     Orthopedic  Referral  -     XR Sternoclavicular Joint G/E 3 Views; Future      Pain at distal and proximal clavicle AC and sternoclavicular joints. Normal alignment and no bony fracture reassuring. Xrs showing degenerative change most likely causing pain at both joints. No difficulties breathing and neurovascularly intact shoulder reassuring less likely needing immediate surgical referral or further imaging at this time but can continue to monitor if symptoms progress or do not improve. Gross strength and range of motion of rotator cuff intact.   Plan as below:   - pain at clavicle joints. Joints showing arthritis of both joints today  - Tizanidine 4mg twice daily for two weeks to help with muscles Sent to pharmacy. Discussed medication side effects and risks.   --Tylenol arthritis twice a day for two weeks then can use as needed.   - Lidocaine cream or gel or patches. Can over the counter in drug store aisle  - Heat pack to neck and shoulder for muscles  - Physical therapy ordered. They should call to schedule in 1-2 business day.  If call missed the number to call in AVS below.   -Can consider  cortisone injection if not improved to AC and sternoclavicular joints.  -Follow-up if no improvement in symptoms in one month after PT     [facility] [place]    Due to language barrier, an  was prfferred and deferred for family member who was present during the history-taking and subsequent discussion and physical exam with this patient.     -----  Chief Complaint   Patient presents with     Right Shoulder - Pain       SUBJECTIVE  Gabi Roblero is a/an 45 year old female who is seen in consultation at the request of  Justin De La Rosa PA-C for evaluation of right shoulder pain.     The patient is seen their niece who interprets.  The patient is Right handed    Onset: 1 month(s) ago. Reports insidious onset without acute precipitating event.  Location of Pain: right sternoclavicular joint  Worsened by: working and sleeping on their side  Better with: Tylenol  Treatments tried: rest/activity avoidance, ice, and Tylenol  Associated symptoms: swelling  Denies numbness, tingling, locking  Orthopedic/Surgical history: NO  Social History/Occupation: Works [employer]     Reviewed previous UC note and referral for shoulder pain.     Patient Active Problem List   Diagnosis     Gastritis     CARDIOVASCULAR SCREENING; LDL GOAL LESS THAN 160     Vitamin D deficiency     GERD (gastroesophageal reflux disease)     Rotator cuff syndrome     Moderate persistent asthma     Other specified hypothyroidism     Morbid obesity (H)     Hypothyroidism     Strain of neck muscle, initial encounter       Current Outpatient Medications   Medication Sig Dispense Refill     acetaminophen (TYLENOL) 325 MG tablet Take 2 tablets (650 mg) by mouth every 4 hours as needed for mild pain or fever (greater than or equal to 38  C /100.4  F (oral) or 38.5  C/ 101.4  F (core).) (Patient not taking: Reported on 3/21/2025) 100 tablet 1     albuterol (PROVENTIL) (2.5 MG/3ML) 0.083% neb solution  Take 1 vial (2.5 mg) by nebulization every 6 hours as needed for shortness of breath or wheezing 90 mL 1     EPINEPHrine (ANY BX GENERIC EQUIV) 0.3 MG/0.3ML injection 2-pack Inject 0.3 mLs (0.3 mg) into the muscle once as needed for anaphylaxis 1 each 1     levothyroxine (SYNTHROID/LEVOTHROID) 150 MCG tablet TAKE ONE TABLET BY MOUTH ONCE DAILY AND TAKE 1.5 TABLETS ON SUNDAY 90 tablet 2     Lidocaine (LIDOCARE) 4 % Patch Place 1 patch onto the skin every 24 hours To prevent lidocaine toxicity, patient should be patch free for 12 hrs daily. (Patient not taking: Reported on 3/21/2025) 10 patch 0     omeprazole (PRILOSEC) 40 MG DR capsule Take 1 capsule (40 mg) by mouth daily 90 capsule 1     order for DME Equipment being ordered: Nebulizer 1 Device 0     SYMBICORT 80-4.5 MCG/ACT Inhaler INHALE TWO PUFFS BY MOUTH TWICE A DAY 10.2 g 1     VENTOLIN  (90 Base) MCG/ACT inhaler INHALE ONE TO TWO PUFFS BY MOUTH EVERY 6 HOURS AS NEEDED FOR SHORTNESS OF BREATH, DYSPNEA OR WHEEZING 54 g 0       PMH, Medications and Allergies were reviewed and updated as needed.    REVIEW OF SYSTEMS:  10 point ROS is negative other than symptoms noted above in HPI    OBJECTIVE:  LMP 03/05/2025 (Approximate)    General: healthy, alert and in no distress  Skin: no suspicious lesions or rash.  CV: distal perfusion intact   Resp: normal respiratory effort without conversational dyspnea   Psych: normal mood and affect  Gait: NORMAL  Neuro: Normal light sensory exam of RU extremity     RIGHT SHOULDER  Inspection:    no swelling, bruising, discoloration, or obvious deformity or asymmetry  Palpation:    Tender about the SC joint and AC joint. Remainder of bony and tendinous landmarks are nontender.    Crepitus is Absent  Active Range of Motion:     Abduction 1800 / FF 1800 /  / IR SI  Opposite arm abduction/flexion/ER full, IR SI joint  Strength:    Scapular plane abduction 5/5 / ER 5/5 / IR 5/5 / biceps 5/5 / Special Tests:    Positive:  crossed arm adduction pain at AC and SC    Negative: supraspinatus (empty can) pain at SC    RADIOLOGY:  Final results and radiologist's interpretation, available in the [facility] health record.  Images were reviewed with the patient in the office today.    My personal interpretation of the performed imaging:     Radiographs of the right - 1 views 4/24/2025  Normal alignment of sternoclavicular and AC joint. Degenerative changes of sternoclavicular and AC joint.       Review of the result(s) of each unique test - XRs  Ordering of each unique test  Prescription drug management    Xrays        Disclaimer: This note consists of symbols derived from keyboarding, dictation and/or voice recognition software. As a result, there may be errors in the script that have gone undetected. Please consider this when interpreting information found in this chart.       Again, thank you for allowing me to participate in the care of your patient.        Sincerely,        Mary Jimenez, DO    Electronically signed

## 2025-04-25 DIAGNOSIS — J45.40 MODERATE PERSISTENT ASTHMA WITHOUT COMPLICATION: ICD-10-CM

## 2025-04-28 DIAGNOSIS — J45.40 MODERATE PERSISTENT ASTHMA WITHOUT COMPLICATION: ICD-10-CM

## 2025-04-29 RX ORDER — ALBUTEROL SULFATE 90 UG/1
AEROSOL, METERED RESPIRATORY (INHALATION)
Qty: 54 G | Refills: 0 | Status: SHIPPED | OUTPATIENT
Start: 2025-04-29

## 2025-04-29 RX ORDER — ALBUTEROL SULFATE 90 UG/1
INHALANT RESPIRATORY (INHALATION)
Qty: 54 G | Refills: 0 | OUTPATIENT
Start: 2025-04-29

## 2025-05-27 DIAGNOSIS — E03.8 OTHER SPECIFIED HYPOTHYROIDISM: ICD-10-CM

## 2025-05-27 DIAGNOSIS — J45.40 MODERATE PERSISTENT ASTHMA WITHOUT COMPLICATION: ICD-10-CM

## 2025-05-27 RX ORDER — LEVOTHYROXINE SODIUM 150 UG/1
TABLET ORAL
Qty: 90 TABLET | Refills: 2 | OUTPATIENT
Start: 2025-05-27

## 2025-05-27 NOTE — TELEPHONE ENCOUNTER
Patient's sister calls requesting synthroid and inhaler. Was told by pharmacy all out of refills. Routing to provider for review, please advise if able to refill medication.    Summer RN 4:01 PM May 27, 2025   [facility]

## 2025-05-28 RX ORDER — BUDESONIDE AND FORMOTEROL FUMARATE DIHYDRATE 80; 4.5 UG/1; UG/1
2 AEROSOL RESPIRATORY (INHALATION) 2 TIMES DAILY
Qty: 10.2 G | Refills: 1 | Status: SHIPPED | OUTPATIENT
Start: 2025-05-28

## 2025-05-28 RX ORDER — DILTIAZEM HYDROCHLORIDE 60 MG/1
2 TABLET, FILM COATED ORAL 2 TIMES DAILY
Qty: 10.2 G | Refills: 1 | Status: SHIPPED | OUTPATIENT
Start: 2025-05-28

## 2025-05-28 RX ORDER — ALBUTEROL SULFATE 0.83 MG/ML
SOLUTION RESPIRATORY (INHALATION)
Qty: 90 ML | Refills: 1 | Status: SHIPPED | OUTPATIENT
Start: 2025-05-28

## 2025-05-28 RX ORDER — LEVOTHYROXINE SODIUM 150 UG/1
TABLET ORAL
Qty: 90 TABLET | Refills: 2 | Status: SHIPPED | OUTPATIENT
Start: 2025-05-28

## 2025-07-30 DIAGNOSIS — K21.9 GASTROESOPHAGEAL REFLUX DISEASE WITHOUT ESOPHAGITIS: ICD-10-CM

## 2025-07-30 RX ORDER — OMEPRAZOLE 40 MG/1
40 CAPSULE, DELAYED RELEASE ORAL DAILY
Qty: 90 CAPSULE | Refills: 0 | Status: SHIPPED | OUTPATIENT
Start: 2025-07-30

## 2025-08-25 DIAGNOSIS — J45.40 MODERATE PERSISTENT ASTHMA WITHOUT COMPLICATION: ICD-10-CM

## 2025-08-26 RX ORDER — ALBUTEROL SULFATE 90 UG/1
AEROSOL, METERED RESPIRATORY (INHALATION)
Qty: 54 G | Refills: 0 | Status: SHIPPED | OUTPATIENT
Start: 2025-08-26